# Patient Record
Sex: MALE | Race: WHITE | Employment: OTHER
[De-identification: names, ages, dates, MRNs, and addresses within clinical notes are randomized per-mention and may not be internally consistent; named-entity substitution may affect disease eponyms.]

---

## 2022-03-18 PROBLEM — K57.30 DIVERTICULOSIS OF SIGMOID COLON: Status: ACTIVE | Noted: 2017-01-09

## 2022-03-19 PROBLEM — K64.1 GRADE II HEMORRHOIDS: Status: ACTIVE | Noted: 2017-01-09

## 2022-03-19 PROBLEM — I50.9 CHF (CONGESTIVE HEART FAILURE) (HCC): Status: ACTIVE | Noted: 2021-09-06

## 2022-03-19 PROBLEM — K64.3 GRADE IV HEMORRHOIDS: Status: ACTIVE | Noted: 2017-01-09

## 2022-03-19 PROBLEM — R06.02 SOB (SHORTNESS OF BREATH): Status: ACTIVE | Noted: 2021-08-22

## 2023-05-21 ENCOUNTER — HOSPITAL ENCOUNTER (EMERGENCY)
Facility: HOSPITAL | Age: 89
Discharge: HOME | End: 2023-05-21
Attending: EMERGENCY MEDICINE
Payer: COMMERCIAL

## 2023-05-21 VITALS
DIASTOLIC BLOOD PRESSURE: 78 MMHG | TEMPERATURE: 98 F | WEIGHT: 155 LBS | HEART RATE: 76 BPM | BODY MASS INDEX: 20.99 KG/M2 | HEIGHT: 72 IN | OXYGEN SATURATION: 95 % | RESPIRATION RATE: 16 BRPM | SYSTOLIC BLOOD PRESSURE: 152 MMHG

## 2023-05-21 DIAGNOSIS — H10.9 CONJUNCTIVITIS OF RIGHT EYE, UNSPECIFIED CONJUNCTIVITIS TYPE: Primary | ICD-10-CM

## 2023-05-21 DIAGNOSIS — S05.01XA ABRASION OF RIGHT CORNEA, INITIAL ENCOUNTER: ICD-10-CM

## 2023-05-21 PROCEDURE — 87186 SC STD MICRODIL/AGAR DIL: CPT

## 2023-05-21 PROCEDURE — 87077 CULTURE AEROBIC IDENTIFY: CPT

## 2023-05-21 PROCEDURE — 87070 CULTURE OTHR SPECIMN AEROBIC: CPT

## 2023-05-21 PROCEDURE — 99283 EMERGENCY DEPT VISIT LOW MDM: CPT

## 2023-05-21 PROCEDURE — 87147 CULTURE TYPE IMMUNOLOGIC: CPT

## 2023-05-21 PROCEDURE — 6370000000 HC RX 637 (ALT 250 FOR IP): Performed by: EMERGENCY MEDICINE

## 2023-05-21 RX ORDER — GENTAMICIN SULFATE 3 MG/ML
1 SOLUTION/ DROPS OPHTHALMIC EVERY 4 HOURS
Qty: 5 ML | Refills: 0 | Status: SHIPPED | OUTPATIENT
Start: 2023-05-21 | End: 2023-05-31

## 2023-05-21 RX ORDER — POLYMYXIN B SULFATE AND TRIMETHOPRIM 1; 10000 MG/ML; [USP'U]/ML
1 SOLUTION OPHTHALMIC EVERY 4 HOURS
Qty: 10 ML | Refills: 0 | Status: SHIPPED | OUTPATIENT
Start: 2023-05-21 | End: 2023-05-31

## 2023-05-21 RX ORDER — TETRACAINE HYDROCHLORIDE 5 MG/ML
1 SOLUTION OPHTHALMIC ONCE
Status: COMPLETED | OUTPATIENT
Start: 2023-05-21 | End: 2023-05-21

## 2023-05-21 RX ADMIN — TETRACAINE HYDROCHLORIDE 1 DROP: 5 SOLUTION OPHTHALMIC at 19:09

## 2023-05-21 RX ADMIN — FLUORESCEIN SODIUM 1 MG: 1 STRIP OPHTHALMIC at 19:09

## 2023-05-21 ASSESSMENT — ENCOUNTER SYMPTOMS: EYE PAIN: 1

## 2023-05-21 ASSESSMENT — LIFESTYLE VARIABLES
HOW OFTEN DO YOU HAVE A DRINK CONTAINING ALCOHOL: NEVER
HOW MANY STANDARD DRINKS CONTAINING ALCOHOL DO YOU HAVE ON A TYPICAL DAY: PATIENT DOES NOT DRINK

## 2023-05-22 ASSESSMENT — ENCOUNTER SYMPTOMS: EYE PAIN: 1

## 2023-05-26 LAB
BACTERIA SPEC CULT: ABNORMAL
SERVICE CMNT-IMP: ABNORMAL

## 2023-12-03 ENCOUNTER — APPOINTMENT (OUTPATIENT)
Facility: HOSPITAL | Age: 89
End: 2023-12-03
Payer: COMMERCIAL

## 2023-12-03 ENCOUNTER — HOSPITAL ENCOUNTER (EMERGENCY)
Facility: HOSPITAL | Age: 89
Discharge: HOME | End: 2023-12-03
Attending: STUDENT IN AN ORGANIZED HEALTH CARE EDUCATION/TRAINING PROGRAM
Payer: COMMERCIAL

## 2023-12-03 VITALS
BODY MASS INDEX: 23.13 KG/M2 | OXYGEN SATURATION: 99 % | HEIGHT: 70 IN | WEIGHT: 161.6 LBS | RESPIRATION RATE: 20 BRPM | HEART RATE: 86 BPM | SYSTOLIC BLOOD PRESSURE: 144 MMHG | DIASTOLIC BLOOD PRESSURE: 78 MMHG | TEMPERATURE: 97.4 F

## 2023-12-03 DIAGNOSIS — J06.9 UPPER RESPIRATORY TRACT INFECTION DUE TO COVID-19 VIRUS: ICD-10-CM

## 2023-12-03 DIAGNOSIS — U07.1 UPPER RESPIRATORY TRACT INFECTION DUE TO COVID-19 VIRUS: ICD-10-CM

## 2023-12-03 DIAGNOSIS — U07.1 COVID-19: Primary | ICD-10-CM

## 2023-12-03 LAB
SARS-COV-2 RDRP RESP QL NAA+PROBE: DETECTED
SOURCE: ABNORMAL

## 2023-12-03 PROCEDURE — 71046 X-RAY EXAM CHEST 2 VIEWS: CPT

## 2023-12-03 PROCEDURE — 99284 EMERGENCY DEPT VISIT MOD MDM: CPT

## 2023-12-03 PROCEDURE — 87635 SARS-COV-2 COVID-19 AMP PRB: CPT

## 2023-12-03 RX ORDER — BENZONATATE 100 MG/1
100 CAPSULE ORAL 3 TIMES DAILY PRN
Qty: 20 CAPSULE | Refills: 0 | Status: SHIPPED | OUTPATIENT
Start: 2023-12-03 | End: 2023-12-10

## 2023-12-03 RX ORDER — BENZONATATE 100 MG/1
100 CAPSULE ORAL 3 TIMES DAILY PRN
Qty: 20 CAPSULE | Refills: 0 | Status: SHIPPED | OUTPATIENT
Start: 2023-12-03 | End: 2023-12-03 | Stop reason: SDUPTHER

## 2023-12-03 ASSESSMENT — ENCOUNTER SYMPTOMS
COUGH: 1
VOMITING: 0
WHEEZING: 0
SORE THROAT: 0
SHORTNESS OF BREATH: 1
NAUSEA: 0
SINUS PAIN: 0
ABDOMINAL PAIN: 0
DIARRHEA: 0

## 2023-12-03 ASSESSMENT — PAIN - FUNCTIONAL ASSESSMENT: PAIN_FUNCTIONAL_ASSESSMENT: 0-10

## 2023-12-03 ASSESSMENT — LIFESTYLE VARIABLES: HOW OFTEN DO YOU HAVE A DRINK CONTAINING ALCOHOL: NEVER

## 2023-12-03 ASSESSMENT — PAIN SCALES - GENERAL: PAINLEVEL_OUTOF10: 0

## 2023-12-03 NOTE — ED TRIAGE NOTES
Per patient, 11/17 was Covid (+) completed treatment and is currently c/o a dry cough for 4-5 days. Self-administered Albuterol at about 1500 has hx of asthma. Used cough drops as well with no relief in symptoms.

## 2023-12-04 NOTE — ED NOTES
Discharge instructions provided. Pt verbalized understanding. Opportunity provided for questions. Pt discharged home.        Eli Hernández RN  12/03/23 5949

## 2023-12-04 NOTE — DISCHARGE INSTRUCTIONS
Thank you for allowing us to provide you with medical care today. We realize that you have many choices for your emergency care needs. We thank you for choosing [facility]. Please choose us in the future for any continued health care needs. The exam and treatment you received in the Emergency Department were for an emergent problem and are not intended as complete care. It is important that you follow up with a doctor, nurse practitioner, or physician's assistant for ongoing care. If your symptoms worsen or you do not improve as expected and you are unable to reach your usual health care provider, you should return to the Emergency Department. We are available 24 hours a day. Please make an appointment with your health care provider(s) for follow up of your Emergency Department visit. Take this sheet with you when you go to your follow-up visit.

## 2024-01-17 ENCOUNTER — APPOINTMENT (OUTPATIENT)
Facility: HOSPITAL | Age: 89
End: 2024-01-17
Payer: MEDICARE

## 2024-01-17 ENCOUNTER — HOSPITAL ENCOUNTER (EMERGENCY)
Facility: HOSPITAL | Age: 89
Discharge: HOME | End: 2024-01-17
Attending: EMERGENCY MEDICINE
Payer: MEDICARE

## 2024-01-17 VITALS
WEIGHT: 160.94 LBS | SYSTOLIC BLOOD PRESSURE: 126 MMHG | HEART RATE: 71 BPM | DIASTOLIC BLOOD PRESSURE: 99 MMHG | BODY MASS INDEX: 23.09 KG/M2 | RESPIRATION RATE: 18 BRPM | TEMPERATURE: 97.7 F | OXYGEN SATURATION: 100 %

## 2024-01-17 DIAGNOSIS — R06.09 DOE (DYSPNEA ON EXERTION): ICD-10-CM

## 2024-01-17 DIAGNOSIS — N28.9 RENAL INSUFFICIENCY: Primary | ICD-10-CM

## 2024-01-17 LAB
ALBUMIN SERPL-MCNC: 3.7 G/DL (ref 3.5–5)
ALBUMIN/GLOB SERPL: 0.9 (ref 1.1–2.2)
ALP SERPL-CCNC: 94 U/L (ref 45–117)
ALT SERPL-CCNC: 18 U/L (ref 12–78)
ANION GAP SERPL CALC-SCNC: 7 MMOL/L (ref 5–15)
AST SERPL-CCNC: 17 U/L (ref 15–37)
BASOPHILS # BLD: 0.1 K/UL (ref 0–0.1)
BASOPHILS NFR BLD: 1 % (ref 0–1)
BILIRUB SERPL-MCNC: 0.8 MG/DL (ref 0.2–1)
BUN SERPL-MCNC: 43 MG/DL (ref 6–20)
BUN/CREAT SERPL: 22 (ref 12–20)
CALCIUM SERPL-MCNC: 8.9 MG/DL (ref 8.5–10.1)
CHLORIDE SERPL-SCNC: 104 MMOL/L (ref 97–108)
CO2 SERPL-SCNC: 29 MMOL/L (ref 21–32)
CREAT SERPL-MCNC: 1.95 MG/DL (ref 0.7–1.3)
DIFFERENTIAL METHOD BLD: ABNORMAL
EKG ATRIAL RATE: 76 BPM
EKG DIAGNOSIS: NORMAL
EKG P-R INTERVAL: 178 MS
EKG Q-T INTERVAL: 474 MS
EKG QRS DURATION: 174 MS
EKG QTC CALCULATION (BAZETT): 519 MS
EKG R AXIS: -69 DEGREES
EKG T AXIS: 95 DEGREES
EKG VENTRICULAR RATE: 72 BPM
EOSINOPHIL # BLD: 0.4 K/UL (ref 0–0.4)
EOSINOPHIL NFR BLD: 5 % (ref 0–7)
ERYTHROCYTE [DISTWIDTH] IN BLOOD BY AUTOMATED COUNT: 17.2 % (ref 11.5–14.5)
GLOBULIN SER CALC-MCNC: 4 G/DL (ref 2–4)
GLUCOSE SERPL-MCNC: 84 MG/DL (ref 65–100)
HCT VFR BLD AUTO: 38.2 % (ref 36.6–50.3)
HGB BLD-MCNC: 12.5 G/DL (ref 12.1–17)
IMM GRANULOCYTES # BLD AUTO: 0.1 K/UL (ref 0–0.04)
IMM GRANULOCYTES NFR BLD AUTO: 1 % (ref 0–0.5)
LYMPHOCYTES # BLD: 2.6 K/UL (ref 0.8–3.5)
LYMPHOCYTES NFR BLD: 28 % (ref 12–49)
MAGNESIUM SERPL-MCNC: 2.2 MG/DL (ref 1.6–2.4)
MCH RBC QN AUTO: 33.2 PG (ref 26–34)
MCHC RBC AUTO-ENTMCNC: 32.7 G/DL (ref 30–36.5)
MCV RBC AUTO: 101.3 FL (ref 80–99)
MONOCYTES # BLD: 1.1 K/UL (ref 0–1)
MONOCYTES NFR BLD: 12 % (ref 5–13)
NEUTS SEG # BLD: 5.1 K/UL (ref 1.8–8)
NEUTS SEG NFR BLD: 53 % (ref 32–75)
NRBC # BLD: 0.03 K/UL (ref 0–0.01)
NRBC BLD-RTO: 0.3 PER 100 WBC
NT PRO BNP: 1567 PG/ML (ref 0–450)
PLATELET # BLD AUTO: 184 K/UL (ref 150–400)
PMV BLD AUTO: 11.4 FL (ref 8.9–12.9)
POTASSIUM SERPL-SCNC: 5.3 MMOL/L (ref 3.5–5.1)
PROT SERPL-MCNC: 7.7 G/DL (ref 6.4–8.2)
RBC # BLD AUTO: 3.77 M/UL (ref 4.1–5.7)
SODIUM SERPL-SCNC: 140 MMOL/L (ref 136–145)
TROPONIN I SERPL HS-MCNC: 23 NG/L (ref 0–76)
TROPONIN I SERPL HS-MCNC: 25 NG/L (ref 0–76)
WBC # BLD AUTO: 9.3 K/UL (ref 4.1–11.1)

## 2024-01-17 PROCEDURE — 80053 COMPREHEN METABOLIC PANEL: CPT

## 2024-01-17 PROCEDURE — 93005 ELECTROCARDIOGRAM TRACING: CPT | Performed by: EMERGENCY MEDICINE

## 2024-01-17 PROCEDURE — 83735 ASSAY OF MAGNESIUM: CPT

## 2024-01-17 PROCEDURE — 85025 COMPLETE CBC W/AUTO DIFF WBC: CPT

## 2024-01-17 PROCEDURE — 83880 ASSAY OF NATRIURETIC PEPTIDE: CPT

## 2024-01-17 PROCEDURE — 71045 X-RAY EXAM CHEST 1 VIEW: CPT

## 2024-01-17 PROCEDURE — 84484 ASSAY OF TROPONIN QUANT: CPT

## 2024-01-17 PROCEDURE — 99285 EMERGENCY DEPT VISIT HI MDM: CPT

## 2024-01-17 PROCEDURE — 36415 COLL VENOUS BLD VENIPUNCTURE: CPT

## 2024-01-17 ASSESSMENT — ENCOUNTER SYMPTOMS
SHORTNESS OF BREATH: 1
STRIDOR: 0
ABDOMINAL PAIN: 0

## 2024-01-17 ASSESSMENT — PAIN - FUNCTIONAL ASSESSMENT: PAIN_FUNCTIONAL_ASSESSMENT: NONE - DENIES PAIN

## 2024-01-17 NOTE — DISCHARGE INSTRUCTIONS
Please go to Dr. Hernández's office tomorrow.  He will see you at 1045 but asked that you get there by 1020.  No strenuous activity until released by cardiology.  Return to the emergency department if you develop any new or worsening symptoms.

## 2024-01-17 NOTE — ED NOTES
Discharge instructions reviewed with pt and copy given by this RN. Pt educated on follow up with Cardiology and PCP. Pt verbalized understanding of all ED MD d/ instruction and education. Pt waiting in ED room for wife for transportation home at this time.

## 2024-01-17 NOTE — ED NOTES
Pt ambulated around nurses station twice. SP02 remained at 97% and heart rate at 98. Pt had no complaints during walk. Provider and nurse informed and aware.

## 2024-01-17 NOTE — ED TRIAGE NOTES
Patient presents ambulatory to the ER with complaints of shortness of breath on exertion that started this morning. Patient denies chest pain associated with shortness of breath.

## 2024-01-17 NOTE — ED PROVIDER NOTES
[facility]  EMERGENCY DEPARTMENT ENCOUNTER      Pt Name: Vinicius Calles  MRN: [id number]  Birthdate 11/14/1932  Date of evaluation: 1/17/2024  Provider: MEDARDO FUCHS MD    CHIEF COMPLAINT       Chief Complaint   Patient presents with    Shortness of Breath         HISTORY OF PRESENT ILLNESS   (Location/Symptom, Timing/Onset, Context/Setting, Quality, Duration, Modifying Factors, Severity)  Note limiting factors.   Patient is a pleasant 89 yo male with past medical history significant for symptomatic bradycardia status post pacemaker followed at [facility] but also seen locally by Dr. Candelaria and Dr. Alirio Hernández who presents with episode of shortness of breath with exertion.  He dealt with COVID infection from November through December noting that he had taken Paxil bid and then had recurrent positive testing after the Paxlovid finished.  He states he recovered fully from that infection.  He states that he does have a rowing machine at home and about a week or 2 ago was on the rowing machine and had an episode of substernal chest pain lasting about 90 minutes not associated with diaphoresis or radiating pain.  He states he is felt okay recently but then [place] and had fairly significant shortness of breath per wife at bedside.  Denies any pain at any point.  He states he was able to get in touch with his cardiology group and has an appointment tomorrow with them.  States he does have history of congestive heart failure but did not note any weight gain of late.  Denies any peripheral pain or recent travel or surgeries.  He states he is on Eliquis at baseline as well denies any prior episodes of DVT or PE.  Denies any overt orthopnea and states he sleeps with 2 pillows but notes that this is not new.  He also states he has asthma but denies any purulent sputum    The history is provided by the patient and the spouse.         Review of External Medical Records:     Nursing Notes were

## 2024-01-19 ENCOUNTER — HOSPITAL ENCOUNTER (OUTPATIENT)
Facility: HOSPITAL | Age: 89
Discharge: HOME | End: 2024-01-19
Attending: INTERNAL MEDICINE | Admitting: INTERNAL MEDICINE
Payer: MEDICARE

## 2024-01-19 VITALS
DIASTOLIC BLOOD PRESSURE: 77 MMHG | HEIGHT: 69 IN | OXYGEN SATURATION: 95 % | BODY MASS INDEX: 23.7 KG/M2 | TEMPERATURE: 98 F | SYSTOLIC BLOOD PRESSURE: 110 MMHG | RESPIRATION RATE: 18 BRPM | WEIGHT: 160 LBS | HEART RATE: 85 BPM

## 2024-01-19 DIAGNOSIS — I20.0 UNSTABLE ANGINA (HCC): ICD-10-CM

## 2024-01-19 LAB
ANION GAP SERPL CALC-SCNC: 4 MMOL/L (ref 5–15)
BUN SERPL-MCNC: 48 MG/DL (ref 6–20)
BUN/CREAT SERPL: 26 (ref 12–20)
CALCIUM SERPL-MCNC: 9.1 MG/DL (ref 8.5–10.1)
CHLORIDE SERPL-SCNC: 107 MMOL/L (ref 97–108)
CO2 SERPL-SCNC: 29 MMOL/L (ref 21–32)
CREAT SERPL-MCNC: 1.82 MG/DL (ref 0.7–1.3)
ECHO BSA: 1.88 M2
ERYTHROCYTE [DISTWIDTH] IN BLOOD BY AUTOMATED COUNT: 16.8 % (ref 11.5–14.5)
GLUCOSE SERPL-MCNC: 116 MG/DL (ref 65–100)
HCT VFR BLD AUTO: 38.9 % (ref 36.6–50.3)
HGB BLD-MCNC: 12.6 G/DL (ref 12.1–17)
MCH RBC QN AUTO: 33.3 PG (ref 26–34)
MCHC RBC AUTO-ENTMCNC: 32.4 G/DL (ref 30–36.5)
MCV RBC AUTO: 102.9 FL (ref 80–99)
NRBC # BLD: 0 K/UL (ref 0–0.01)
NRBC BLD-RTO: 0 PER 100 WBC
PLATELET # BLD AUTO: 196 K/UL (ref 150–400)
PMV BLD AUTO: 12 FL (ref 8.9–12.9)
POTASSIUM SERPL-SCNC: 5.1 MMOL/L (ref 3.5–5.1)
RBC # BLD AUTO: 3.78 M/UL (ref 4.1–5.7)
SODIUM SERPL-SCNC: 140 MMOL/L (ref 136–145)
WBC # BLD AUTO: 10.1 K/UL (ref 4.1–11.1)

## 2024-01-19 PROCEDURE — C1760 CLOSURE DEV, VASC: HCPCS | Performed by: INTERNAL MEDICINE

## 2024-01-19 PROCEDURE — 85027 COMPLETE CBC AUTOMATED: CPT

## 2024-01-19 PROCEDURE — 99152 MOD SED SAME PHYS/QHP 5/>YRS: CPT | Performed by: INTERNAL MEDICINE

## 2024-01-19 PROCEDURE — 2709999900 HC NON-CHARGEABLE SUPPLY: Performed by: INTERNAL MEDICINE

## 2024-01-19 PROCEDURE — 6360000002 HC RX W HCPCS: Performed by: INTERNAL MEDICINE

## 2024-01-19 PROCEDURE — 80048 BASIC METABOLIC PNL TOTAL CA: CPT

## 2024-01-19 PROCEDURE — 36415 COLL VENOUS BLD VENIPUNCTURE: CPT

## 2024-01-19 PROCEDURE — 6360000004 HC RX CONTRAST MEDICATION: Performed by: INTERNAL MEDICINE

## 2024-01-19 PROCEDURE — C1713 ANCHOR/SCREW BN/BN,TIS/BN: HCPCS | Performed by: INTERNAL MEDICINE

## 2024-01-19 PROCEDURE — 2580000003 HC RX 258: Performed by: INTERNAL MEDICINE

## 2024-01-19 PROCEDURE — 2500000003 HC RX 250 WO HCPCS: Performed by: INTERNAL MEDICINE

## 2024-01-19 PROCEDURE — C1894 INTRO/SHEATH, NON-LASER: HCPCS | Performed by: INTERNAL MEDICINE

## 2024-01-19 PROCEDURE — 93458 L HRT ARTERY/VENTRICLE ANGIO: CPT | Performed by: INTERNAL MEDICINE

## 2024-01-19 PROCEDURE — 99153 MOD SED SAME PHYS/QHP EA: CPT | Performed by: INTERNAL MEDICINE

## 2024-01-19 RX ORDER — MIDAZOLAM HYDROCHLORIDE 1 MG/ML
INJECTION INTRAMUSCULAR; INTRAVENOUS PRN
Status: DISCONTINUED | OUTPATIENT
Start: 2024-01-19 | End: 2024-01-19 | Stop reason: HOSPADM

## 2024-01-19 RX ORDER — VERAPAMIL HYDROCHLORIDE 2.5 MG/ML
INJECTION, SOLUTION INTRAVENOUS PRN
Status: DISCONTINUED | OUTPATIENT
Start: 2024-01-19 | End: 2024-01-19 | Stop reason: HOSPADM

## 2024-01-19 RX ORDER — FUROSEMIDE 20 MG/1
20 TABLET ORAL DAILY
COMMUNITY

## 2024-01-19 RX ORDER — METOPROLOL SUCCINATE 25 MG/1
25 TABLET, EXTENDED RELEASE ORAL DAILY
COMMUNITY

## 2024-01-19 RX ORDER — FENTANYL CITRATE 50 UG/ML
INJECTION, SOLUTION INTRAMUSCULAR; INTRAVENOUS PRN
Status: DISCONTINUED | OUTPATIENT
Start: 2024-01-19 | End: 2024-01-19 | Stop reason: HOSPADM

## 2024-01-19 RX ORDER — 0.9 % SODIUM CHLORIDE 0.9 %
INTRAVENOUS SOLUTION INTRAVENOUS CONTINUOUS PRN
Status: COMPLETED | OUTPATIENT
Start: 2024-01-19 | End: 2024-01-19

## 2024-01-19 RX ORDER — EZETIMIBE 10 MG/1
10 TABLET ORAL DAILY
COMMUNITY

## 2024-01-19 RX ORDER — LIDOCAINE HYDROCHLORIDE 10 MG/ML
INJECTION, SOLUTION INFILTRATION; PERINEURAL PRN
Status: DISCONTINUED | OUTPATIENT
Start: 2024-01-19 | End: 2024-01-19 | Stop reason: HOSPADM

## 2024-01-19 NOTE — PROGRESS NOTES
TRANSFER - IN REPORT:    Verbal report received from LACEY Jenkins on Vinicius Calles  being received from [facility] for routine post op. Report consisted of patient’s Situation, Background, Assessment and Recommendations(SBAR). Information from the following report(s) procedure summary, MAR was reviewed with the receiving clinician. Opportunity for questions and clarification was provided. Assessment completed upon patient’s arrival to Cardiac Cath Lab RECOVERY AREA and care assumed.       Cardiac Cath Lab Recovery Arrival Note:    Vinicius Calles arrived to [facility] recovery area.  Patient procedure= LHC. Patient on cardiac monitor, non-invasive blood pressure, SPO2 monitor. On RA.  IV  of NS on pump at 75 ml/hr. Patient status doing well without problems. Patient is A&Ox 4. Patient reports no complaints.    PROCEDURE SITE CHECK:    Procedure site:Right radial without any bleeding and TR band @ 11 , Right groin with Angioseal, zero pain/discomfort reported at procedure site.     No change in patient status. Continue to monitor patient and status.

## 2024-01-19 NOTE — PROGRESS NOTES
CATH LAB to RECOVERY ROOM REPORT    Procedure: [facility]    MD: TRAY Hernández MD    The procedure was diagnostic only.    Verbal Report given to Recovery Nurse on patient being transferred to Cardiac Cath Lab  for routine post-op. Patient stable upon transfer to .  Vitals, mental status, MAR, procedural summary discussed with recovery RN.    Medication given during procedure:    Contrast:100mL                          Versed:4mg                                                               Fentanyl:100mcg                                                           Sheaths:    Right radial sheath pulled at 1058 am, band at 11mL of air    Right femoral artery 6fr sheath pulled at 1050 am with angioseal.

## 2024-01-19 NOTE — PROGRESS NOTES
1300: Ambulated patient to the bathroom with a steady gait, voided without difficulty. Patient denies chest pain, shortness of breath, or dizziness. Returned to stretcher. Vital signs stable.     Procedural site is clean, dry, and intact. No bleeding, no hematoma.     Assisted patient in dressing.     Educated patient about their sedation precautions such as not driving, operating any machinery, or signing legal documents 24 hours post procedure.     Reviewed discharge instructions, including medications and site care using the teach back method.  Answered all questions. Verbalized understanding. Will resume Eliquis tonight    1315: Removed peripheral IV.    1330:Escorted to discharge area in a wheelchair with all of their belongings including cell phone.    1330: Patient's relative present to take patient home. Reviewed discharge instructions with patients' relative, they verbalized understanding.

## 2024-01-19 NOTE — DISCHARGE INSTRUCTIONS
[email or web address]    Patient Discharge Instructions    Vinicius Calles / [id number] : 1932    Admitted 2024 Discharged: 2024       It is important that you take the medication exactly as they are prescribed.   Keep your medication in the bottles provided by the pharmacist and keep a list of the medication names, dosages, and times to be taken in your wallet.   Do not take other medications without consulting your doctor.     BRING ALL OF YOUR MEDICINES TO YOUR OFFICE VISIT with Dr. Hernández    Follow-up with Alirio Hernández MD in 6 week      Cardiac Catheterization  Discharge Instructions    Do not drive, operate any machinery, or sign any legal documents for 24 hours after your procedure.  You must have someone to drive you home.    You may take a shower 24 hours after your cardiac catheterization.  Be sure to get the dressing wet and then remove it; gently wash the area with warm soapy water.  Pat dry and leave open to air.  To help prevent infections, be sure to keep the cath site clean and dry.  No lotions, creams, powders, ointments, etc. in the cath site for approximately 1 week.    Do not take a tub bath, get in a hot tub or swimming pool for approximately 5 days or until the cath site is completely healed.      No strenuous activity or heavy lifting over 10 lbs. for 7 days.    Drink plenty of fluids for 24-48 hours after your cath to flush the contrast dye from your kidneys. No alcoholic beverages for 24 hours.  You may resume your previous diet (low fat, low cholesterol) after your cath.      After your cath, some bruising or discomfort is common during the healing process.  Tylenol, 1-2 tablets every 6 hours as needed, is recommended if you experience any discomfort.  If you experience any signs or symptoms of infection such as fever, chills, or poorly healing incision, persistent tenderness or swelling in the groin, redness and/or warmth to the touch, numbness, significant tingling or pain at

## 2024-01-19 NOTE — PROGRESS NOTES
Cardiac Cath Lab Recovery Arrival Note:      Vinicius Calles arrived to Cardiac Cath Lab, Recovery Area. Staff introduced to patient. Patient identifiers verified with NAME and DATE OF BIRTH. Procedure verified with patient. Consent forms reviewed and signed by patient or authorized representative and verified. Allergies verified.     Patient and family oriented to department. Patient and family informed of procedure and plan of care.     Questions answered with review. Patient prepped for procedure, per orders from physician, prior to arrival.    Patient on cardiac monitor, non-invasive blood pressure, SPO2 monitor. On RA. Patient is A&Ox 4. Patient reports no complaints.     Patient in stretcher, in low position, with side rails up, call bell within reach, patient instructed to call if assistance as needed.    Patient prep in: [facility] Recovery Area, [place] [address].   Patient family has pager #   Family in: Alysa, wife [phone].   Prep by: ANA Leonardo

## 2024-08-07 ENCOUNTER — HOSPITAL ENCOUNTER (INPATIENT)
Facility: HOSPITAL | Age: 89
LOS: 4 days | Discharge: NURSING, REHAB OR LONG-TERM CARE | DRG: 536 | End: 2024-08-12
Attending: EMERGENCY MEDICINE | Admitting: FAMILY MEDICINE
Payer: MEDICARE

## 2024-08-07 ENCOUNTER — APPOINTMENT (OUTPATIENT)
Facility: HOSPITAL | Age: 89
DRG: 536 | End: 2024-08-07
Payer: MEDICARE

## 2024-08-07 DIAGNOSIS — S70.01XA HEMATOMA OF RIGHT HIP, INITIAL ENCOUNTER: Primary | ICD-10-CM

## 2024-08-07 DIAGNOSIS — S32.511A CLOSED FRACTURE OF SUPERIOR RAMUS OF RIGHT PUBIS, INITIAL ENCOUNTER (HCC): ICD-10-CM

## 2024-08-07 DIAGNOSIS — M25.551 HIP PAIN, ACUTE, RIGHT: ICD-10-CM

## 2024-08-07 PROBLEM — S32.9XXS: Status: ACTIVE | Noted: 2024-08-07

## 2024-08-07 LAB
ANION GAP SERPL CALC-SCNC: 5 MMOL/L (ref 5–15)
BASOPHILS # BLD: 0.1 K/UL (ref 0–0.1)
BASOPHILS NFR BLD: 1 % (ref 0–1)
BUN SERPL-MCNC: 55 MG/DL (ref 6–20)
BUN/CREAT SERPL: 25 (ref 12–20)
CALCIUM SERPL-MCNC: 9.6 MG/DL (ref 8.5–10.1)
CHLORIDE SERPL-SCNC: 105 MMOL/L (ref 97–108)
CO2 SERPL-SCNC: 29 MMOL/L (ref 21–32)
COMMENT:: NORMAL
CREAT SERPL-MCNC: 2.2 MG/DL (ref 0.7–1.3)
DIFFERENTIAL METHOD BLD: ABNORMAL
EOSINOPHIL # BLD: 0.2 K/UL (ref 0–0.4)
EOSINOPHIL NFR BLD: 1 % (ref 0–7)
ERYTHROCYTE [DISTWIDTH] IN BLOOD BY AUTOMATED COUNT: 17.2 % (ref 11.5–14.5)
GLUCOSE SERPL-MCNC: 156 MG/DL (ref 65–100)
HCT VFR BLD AUTO: 37.9 % (ref 36.6–50.3)
HGB BLD-MCNC: 12.1 G/DL (ref 12.1–17)
IMM GRANULOCYTES # BLD AUTO: 0.1 K/UL (ref 0–0.04)
IMM GRANULOCYTES NFR BLD AUTO: 1 % (ref 0–0.5)
LYMPHOCYTES # BLD: 2.5 K/UL (ref 0.8–3.5)
LYMPHOCYTES NFR BLD: 19 % (ref 12–49)
MCH RBC QN AUTO: 33.8 PG (ref 26–34)
MCHC RBC AUTO-ENTMCNC: 31.9 G/DL (ref 30–36.5)
MCV RBC AUTO: 105.9 FL (ref 80–99)
MONOCYTES # BLD: 1.5 K/UL (ref 0–1)
MONOCYTES NFR BLD: 11 % (ref 5–13)
NEUTS SEG # BLD: 8.8 K/UL (ref 1.8–8)
NEUTS SEG NFR BLD: 67 % (ref 32–75)
NRBC # BLD: 0.05 K/UL (ref 0–0.01)
NRBC BLD-RTO: 0.4 PER 100 WBC
PLATELET # BLD AUTO: 182 K/UL (ref 150–400)
PMV BLD AUTO: 11.6 FL (ref 8.9–12.9)
POTASSIUM SERPL-SCNC: 4.5 MMOL/L (ref 3.5–5.1)
RBC # BLD AUTO: 3.58 M/UL (ref 4.1–5.7)
SODIUM SERPL-SCNC: 139 MMOL/L (ref 136–145)
SPECIMEN HOLD: NORMAL
WBC # BLD AUTO: 13.2 K/UL (ref 4.1–11.1)

## 2024-08-07 PROCEDURE — 99285 EMERGENCY DEPT VISIT HI MDM: CPT

## 2024-08-07 PROCEDURE — 80048 BASIC METABOLIC PNL TOTAL CA: CPT

## 2024-08-07 PROCEDURE — 72192 CT PELVIS W/O DYE: CPT

## 2024-08-07 PROCEDURE — 85025 COMPLETE CBC W/AUTO DIFF WBC: CPT

## 2024-08-07 PROCEDURE — 2580000003 HC RX 258: Performed by: STUDENT IN AN ORGANIZED HEALTH CARE EDUCATION/TRAINING PROGRAM

## 2024-08-07 PROCEDURE — 94640 AIRWAY INHALATION TREATMENT: CPT

## 2024-08-07 PROCEDURE — APPNB30 APP NON BILLABLE TIME 0-30 MINS: Performed by: PHYSICIAN ASSISTANT

## 2024-08-07 PROCEDURE — 6370000000 HC RX 637 (ALT 250 FOR IP): Performed by: PHYSICIAN ASSISTANT

## 2024-08-07 PROCEDURE — 6360000002 HC RX W HCPCS: Performed by: STUDENT IN AN ORGANIZED HEALTH CARE EDUCATION/TRAINING PROGRAM

## 2024-08-07 PROCEDURE — 36415 COLL VENOUS BLD VENIPUNCTURE: CPT

## 2024-08-07 RX ORDER — ONDANSETRON 2 MG/ML
4 INJECTION INTRAMUSCULAR; INTRAVENOUS EVERY 6 HOURS PRN
Status: DISCONTINUED | OUTPATIENT
Start: 2024-08-07 | End: 2024-08-12 | Stop reason: HOSPADM

## 2024-08-07 RX ORDER — ATORVASTATIN CALCIUM 40 MG/1
20 TABLET, FILM COATED ORAL DAILY
Status: DISCONTINUED | OUTPATIENT
Start: 2024-08-08 | End: 2024-08-12 | Stop reason: HOSPADM

## 2024-08-07 RX ORDER — SODIUM CHLORIDE 0.9 % (FLUSH) 0.9 %
5-40 SYRINGE (ML) INJECTION PRN
Status: DISCONTINUED | OUTPATIENT
Start: 2024-08-07 | End: 2024-08-12 | Stop reason: HOSPADM

## 2024-08-07 RX ORDER — VALSARTAN 40 MG/1
20 TABLET ORAL DAILY
Status: DISCONTINUED | OUTPATIENT
Start: 2024-08-08 | End: 2024-08-12 | Stop reason: HOSPADM

## 2024-08-07 RX ORDER — FUROSEMIDE 20 MG/1
20 TABLET ORAL DAILY
Status: DISCONTINUED | OUTPATIENT
Start: 2024-08-08 | End: 2024-08-08

## 2024-08-07 RX ORDER — ONDANSETRON 4 MG/1
4 TABLET, ORALLY DISINTEGRATING ORAL EVERY 8 HOURS PRN
Status: DISCONTINUED | OUTPATIENT
Start: 2024-08-07 | End: 2024-08-12 | Stop reason: HOSPADM

## 2024-08-07 RX ORDER — ACETAMINOPHEN 650 MG/1
650 SUPPOSITORY RECTAL EVERY 6 HOURS PRN
Status: DISCONTINUED | OUTPATIENT
Start: 2024-08-07 | End: 2024-08-12 | Stop reason: HOSPADM

## 2024-08-07 RX ORDER — SODIUM CHLORIDE 9 MG/ML
INJECTION, SOLUTION INTRAVENOUS PRN
Status: DISCONTINUED | OUTPATIENT
Start: 2024-08-07 | End: 2024-08-12 | Stop reason: HOSPADM

## 2024-08-07 RX ORDER — HYDROXYZINE HYDROCHLORIDE 25 MG/1
25 TABLET, FILM COATED ORAL 3 TIMES DAILY PRN
Status: DISCONTINUED | OUTPATIENT
Start: 2024-08-07 | End: 2024-08-12 | Stop reason: HOSPADM

## 2024-08-07 RX ORDER — MORPHINE SULFATE 2 MG/ML
2 INJECTION, SOLUTION INTRAMUSCULAR; INTRAVENOUS EVERY 4 HOURS PRN
Status: DISCONTINUED | OUTPATIENT
Start: 2024-08-07 | End: 2024-08-12 | Stop reason: HOSPADM

## 2024-08-07 RX ORDER — DORZOLAMIDE HYDROCHLORIDE AND TIMOLOL MALEATE 20; 5 MG/ML; MG/ML
1 SOLUTION/ DROPS OPHTHALMIC 2 TIMES DAILY
Status: DISCONTINUED | OUTPATIENT
Start: 2024-08-07 | End: 2024-08-08 | Stop reason: CLARIF

## 2024-08-07 RX ORDER — OXYCODONE HYDROCHLORIDE 5 MG/1
5 TABLET ORAL EVERY 4 HOURS PRN
Status: DISCONTINUED | OUTPATIENT
Start: 2024-08-07 | End: 2024-08-12 | Stop reason: HOSPADM

## 2024-08-07 RX ORDER — PANTOPRAZOLE SODIUM 40 MG/1
40 TABLET, DELAYED RELEASE ORAL
Status: DISCONTINUED | OUTPATIENT
Start: 2024-08-08 | End: 2024-08-12 | Stop reason: HOSPADM

## 2024-08-07 RX ORDER — METOPROLOL SUCCINATE 25 MG/1
25 TABLET, EXTENDED RELEASE ORAL DAILY
Status: DISCONTINUED | OUTPATIENT
Start: 2024-08-08 | End: 2024-08-12 | Stop reason: HOSPADM

## 2024-08-07 RX ORDER — POLYETHYLENE GLYCOL 3350 17 G/17G
17 POWDER, FOR SOLUTION ORAL DAILY PRN
Status: DISCONTINUED | OUTPATIENT
Start: 2024-08-07 | End: 2024-08-12 | Stop reason: HOSPADM

## 2024-08-07 RX ORDER — 0.9 % SODIUM CHLORIDE 0.9 %
500 INTRAVENOUS SOLUTION INTRAVENOUS ONCE
Status: DISCONTINUED | OUTPATIENT
Start: 2024-08-07 | End: 2024-08-07

## 2024-08-07 RX ORDER — FINASTERIDE 5 MG/1
5 TABLET, FILM COATED ORAL DAILY
Status: DISCONTINUED | OUTPATIENT
Start: 2024-08-08 | End: 2024-08-12 | Stop reason: HOSPADM

## 2024-08-07 RX ORDER — ACETAMINOPHEN 325 MG/1
650 TABLET ORAL EVERY 6 HOURS PRN
Status: DISCONTINUED | OUTPATIENT
Start: 2024-08-07 | End: 2024-08-12 | Stop reason: HOSPADM

## 2024-08-07 RX ORDER — EZETIMIBE 10 MG/1
10 TABLET ORAL DAILY
Status: DISCONTINUED | OUTPATIENT
Start: 2024-08-08 | End: 2024-08-12 | Stop reason: HOSPADM

## 2024-08-07 RX ORDER — SODIUM CHLORIDE 0.9 % (FLUSH) 0.9 %
5-40 SYRINGE (ML) INJECTION EVERY 12 HOURS SCHEDULED
Status: DISCONTINUED | OUTPATIENT
Start: 2024-08-07 | End: 2024-08-12 | Stop reason: HOSPADM

## 2024-08-07 RX ORDER — OXYCODONE HYDROCHLORIDE 5 MG/1
2.5 TABLET ORAL EVERY 4 HOURS PRN
Status: DISCONTINUED | OUTPATIENT
Start: 2024-08-07 | End: 2024-08-12 | Stop reason: HOSPADM

## 2024-08-07 RX ORDER — ACETAMINOPHEN 325 MG/1
650 TABLET ORAL EVERY 6 HOURS
Status: DISCONTINUED | OUTPATIENT
Start: 2024-08-07 | End: 2024-08-12 | Stop reason: HOSPADM

## 2024-08-07 RX ADMIN — ACETAMINOPHEN 650 MG: 325 TABLET ORAL at 23:03

## 2024-08-07 RX ADMIN — Medication 10 ML: at 23:54

## 2024-08-07 RX ADMIN — ARFORMOTEROL TARTRATE: 15 SOLUTION RESPIRATORY (INHALATION) at 23:43

## 2024-08-07 ASSESSMENT — PAIN DESCRIPTION - PAIN TYPE
TYPE: ACUTE PAIN
TYPE: ACUTE PAIN

## 2024-08-07 ASSESSMENT — PAIN SCALES - GENERAL
PAINLEVEL_OUTOF10: 9
PAINLEVEL_OUTOF10: 9

## 2024-08-07 ASSESSMENT — PAIN - FUNCTIONAL ASSESSMENT
PAIN_FUNCTIONAL_ASSESSMENT: 0-10
PAIN_FUNCTIONAL_ASSESSMENT: PREVENTS OR INTERFERES SOME ACTIVE ACTIVITIES AND ADLS
PAIN_FUNCTIONAL_ASSESSMENT: PREVENTS OR INTERFERES WITH MANY ACTIVE NOT PASSIVE ACTIVITIES

## 2024-08-07 ASSESSMENT — PAIN DESCRIPTION - LOCATION
LOCATION: HIP;PELVIS
LOCATION: HIP

## 2024-08-07 ASSESSMENT — PAIN DESCRIPTION - ORIENTATION: ORIENTATION: RIGHT

## 2024-08-07 ASSESSMENT — PAIN DESCRIPTION - DESCRIPTORS: DESCRIPTORS: ACHING

## 2024-08-07 ASSESSMENT — PAIN DESCRIPTION - FREQUENCY: FREQUENCY: INTERMITTENT

## 2024-08-07 ASSESSMENT — PAIN DESCRIPTION - ONSET: ONSET: SUDDEN

## 2024-08-08 PROBLEM — T14.8XXA HEMATOMA: Status: ACTIVE | Noted: 2024-08-08

## 2024-08-08 PROBLEM — S32.511A CLOSED FRACTURE OF RIGHT SUPERIOR RIM OF PUBIS (HCC): Status: ACTIVE | Noted: 2024-08-08

## 2024-08-08 LAB
ALBUMIN SERPL-MCNC: 3.5 G/DL (ref 3.5–5)
ALBUMIN/GLOB SERPL: 1.3 (ref 1.1–2.2)
ALP SERPL-CCNC: 79 U/L (ref 45–117)
ALT SERPL-CCNC: 56 U/L (ref 12–78)
ANION GAP SERPL CALC-SCNC: 8 MMOL/L (ref 5–15)
AST SERPL-CCNC: 28 U/L (ref 15–37)
BASOPHILS # BLD: 0.1 K/UL (ref 0–0.1)
BASOPHILS NFR BLD: 1 % (ref 0–1)
BILIRUB SERPL-MCNC: 1.6 MG/DL (ref 0.2–1)
BUN SERPL-MCNC: 56 MG/DL (ref 6–20)
BUN/CREAT SERPL: 28 (ref 12–20)
CALCIUM SERPL-MCNC: 8.9 MG/DL (ref 8.5–10.1)
CHLORIDE SERPL-SCNC: 105 MMOL/L (ref 97–108)
CO2 SERPL-SCNC: 27 MMOL/L (ref 21–32)
CREAT SERPL-MCNC: 1.97 MG/DL (ref 0.7–1.3)
DIFFERENTIAL METHOD BLD: ABNORMAL
EOSINOPHIL # BLD: 0.3 K/UL (ref 0–0.4)
EOSINOPHIL NFR BLD: 3 % (ref 0–7)
ERYTHROCYTE [DISTWIDTH] IN BLOOD BY AUTOMATED COUNT: 17.2 % (ref 11.5–14.5)
GLOBULIN SER CALC-MCNC: 2.7 G/DL (ref 2–4)
GLUCOSE SERPL-MCNC: 106 MG/DL (ref 65–100)
HCT VFR BLD AUTO: 31.9 % (ref 36.6–50.3)
HGB BLD-MCNC: 10.7 G/DL (ref 12.1–17)
IMM GRANULOCYTES # BLD AUTO: 0.1 K/UL (ref 0–0.04)
IMM GRANULOCYTES NFR BLD AUTO: 1 % (ref 0–0.5)
LYMPHOCYTES # BLD: 2.7 K/UL (ref 0.8–3.5)
LYMPHOCYTES NFR BLD: 24 % (ref 12–49)
MCH RBC QN AUTO: 34 PG (ref 26–34)
MCHC RBC AUTO-ENTMCNC: 33.5 G/DL (ref 30–36.5)
MCV RBC AUTO: 101.3 FL (ref 80–99)
MONOCYTES # BLD: 1.4 K/UL (ref 0–1)
MONOCYTES NFR BLD: 12 % (ref 5–13)
NEUTS SEG # BLD: 6.8 K/UL (ref 1.8–8)
NEUTS SEG NFR BLD: 59 % (ref 32–75)
NRBC # BLD: 0.05 K/UL (ref 0–0.01)
NRBC BLD-RTO: 0.4 PER 100 WBC
PLATELET # BLD AUTO: 162 K/UL (ref 150–400)
PMV BLD AUTO: 12.4 FL (ref 8.9–12.9)
POTASSIUM SERPL-SCNC: 4 MMOL/L (ref 3.5–5.1)
PROT SERPL-MCNC: 6.2 G/DL (ref 6.4–8.2)
RBC # BLD AUTO: 3.15 M/UL (ref 4.1–5.7)
SODIUM SERPL-SCNC: 140 MMOL/L (ref 136–145)
WBC # BLD AUTO: 11.4 K/UL (ref 4.1–11.1)

## 2024-08-08 PROCEDURE — 85025 COMPLETE CBC W/AUTO DIFF WBC: CPT

## 2024-08-08 PROCEDURE — 6370000000 HC RX 637 (ALT 250 FOR IP): Performed by: PHYSICIAN ASSISTANT

## 2024-08-08 PROCEDURE — 80053 COMPREHEN METABOLIC PANEL: CPT

## 2024-08-08 PROCEDURE — 94640 AIRWAY INHALATION TREATMENT: CPT

## 2024-08-08 PROCEDURE — 97535 SELF CARE MNGMENT TRAINING: CPT

## 2024-08-08 PROCEDURE — 36415 COLL VENOUS BLD VENIPUNCTURE: CPT

## 2024-08-08 PROCEDURE — 6360000002 HC RX W HCPCS: Performed by: STUDENT IN AN ORGANIZED HEALTH CARE EDUCATION/TRAINING PROGRAM

## 2024-08-08 PROCEDURE — 99221 1ST HOSP IP/OBS SF/LOW 40: CPT | Performed by: PHYSICIAN ASSISTANT

## 2024-08-08 PROCEDURE — 6370000000 HC RX 637 (ALT 250 FOR IP): Performed by: STUDENT IN AN ORGANIZED HEALTH CARE EDUCATION/TRAINING PROGRAM

## 2024-08-08 PROCEDURE — 97161 PT EVAL LOW COMPLEX 20 MIN: CPT

## 2024-08-08 PROCEDURE — 6370000000 HC RX 637 (ALT 250 FOR IP)

## 2024-08-08 PROCEDURE — G0378 HOSPITAL OBSERVATION PER HR: HCPCS

## 2024-08-08 PROCEDURE — 1100000000 HC RM PRIVATE

## 2024-08-08 PROCEDURE — 97530 THERAPEUTIC ACTIVITIES: CPT

## 2024-08-08 PROCEDURE — 97165 OT EVAL LOW COMPLEX 30 MIN: CPT

## 2024-08-08 PROCEDURE — 2580000003 HC RX 258: Performed by: STUDENT IN AN ORGANIZED HEALTH CARE EDUCATION/TRAINING PROGRAM

## 2024-08-08 PROCEDURE — 99221 1ST HOSP IP/OBS SF/LOW 40: CPT | Performed by: ORTHOPAEDIC SURGERY

## 2024-08-08 PROCEDURE — 97116 GAIT TRAINING THERAPY: CPT

## 2024-08-08 RX ORDER — DORZOLAMIDE HCL 20 MG/ML
1 SOLUTION/ DROPS OPHTHALMIC 2 TIMES DAILY
Status: DISCONTINUED | OUTPATIENT
Start: 2024-08-08 | End: 2024-08-12 | Stop reason: HOSPADM

## 2024-08-08 RX ORDER — FUROSEMIDE 20 MG/1
20 TABLET ORAL 2 TIMES DAILY
Status: DISCONTINUED | OUTPATIENT
Start: 2024-08-08 | End: 2024-08-12 | Stop reason: HOSPADM

## 2024-08-08 RX ORDER — TIMOLOL MALEATE 5 MG/ML
1 SOLUTION/ DROPS OPHTHALMIC 2 TIMES DAILY
Status: DISCONTINUED | OUTPATIENT
Start: 2024-08-08 | End: 2024-08-12 | Stop reason: HOSPADM

## 2024-08-08 RX ADMIN — PANTOPRAZOLE SODIUM 40 MG: 40 TABLET, DELAYED RELEASE ORAL at 06:47

## 2024-08-08 RX ADMIN — OXYCODONE HYDROCHLORIDE 5 MG: 5 TABLET ORAL at 03:26

## 2024-08-08 RX ADMIN — METOPROLOL SUCCINATE 25 MG: 25 TABLET, EXTENDED RELEASE ORAL at 10:04

## 2024-08-08 RX ADMIN — ACETAMINOPHEN 650 MG: 325 TABLET ORAL at 17:30

## 2024-08-08 RX ADMIN — HYDROXYZINE HYDROCHLORIDE 25 MG: 25 TABLET, FILM COATED ORAL at 09:57

## 2024-08-08 RX ADMIN — ATORVASTATIN CALCIUM 20 MG: 40 TABLET, FILM COATED ORAL at 10:03

## 2024-08-08 RX ADMIN — FINASTERIDE 5 MG: 5 TABLET, FILM COATED ORAL at 09:58

## 2024-08-08 RX ADMIN — ARFORMOTEROL TARTRATE: 15 SOLUTION RESPIRATORY (INHALATION) at 10:31

## 2024-08-08 RX ADMIN — DORZOLAMIDE HYDROCHLORIDE 1 DROP: 20 SOLUTION/ DROPS OPHTHALMIC at 10:08

## 2024-08-08 RX ADMIN — Medication 10 ML: at 10:04

## 2024-08-08 RX ADMIN — ACETAMINOPHEN 650 MG: 325 TABLET ORAL at 22:06

## 2024-08-08 RX ADMIN — FUROSEMIDE 20 MG: 20 TABLET ORAL at 22:07

## 2024-08-08 RX ADMIN — ACETAMINOPHEN 650 MG: 325 TABLET ORAL at 12:06

## 2024-08-08 RX ADMIN — FUROSEMIDE 20 MG: 20 TABLET ORAL at 09:59

## 2024-08-08 RX ADMIN — ACETAMINOPHEN 650 MG: 325 TABLET ORAL at 03:25

## 2024-08-08 RX ADMIN — EZETIMIBE 10 MG: 10 TABLET ORAL at 09:57

## 2024-08-08 RX ADMIN — TIMOLOL MALEATE 1 DROP: 5 SOLUTION OPHTHALMIC at 10:07

## 2024-08-08 RX ADMIN — OXYCODONE HYDROCHLORIDE 5 MG: 5 TABLET ORAL at 20:48

## 2024-08-08 RX ADMIN — OXYCODONE HYDROCHLORIDE 5 MG: 5 TABLET ORAL at 13:57

## 2024-08-08 ASSESSMENT — PAIN SCALES - GENERAL
PAINLEVEL_OUTOF10: 9
PAINLEVEL_OUTOF10: 8
PAINLEVEL_OUTOF10: 7
PAINLEVEL_OUTOF10: 2

## 2024-08-08 ASSESSMENT — PAIN DESCRIPTION - ORIENTATION
ORIENTATION: RIGHT
ORIENTATION: RIGHT

## 2024-08-08 ASSESSMENT — PAIN - FUNCTIONAL ASSESSMENT: PAIN_FUNCTIONAL_ASSESSMENT: PREVENTS OR INTERFERES SOME ACTIVE ACTIVITIES AND ADLS

## 2024-08-08 ASSESSMENT — PAIN DESCRIPTION - LOCATION
LOCATION: PELVIS;BACK
LOCATION: LEG

## 2024-08-08 ASSESSMENT — PAIN DESCRIPTION - DESCRIPTORS: DESCRIPTORS: ACHING;THROBBING

## 2024-08-08 NOTE — ED NOTES
resuscitation: N/A    Recommendation    Pending orders eye drops pending verification from pharmacy, morning labs  Consults ordered: IP CONSULT TO CASE MANAGEMENT    Consulted provider:      Plan for next 24 hours: Continue daily lasix,hold eliquis due to hematoma of right hip, rehab placement, no surgery  Additional Comments: None     If any further questions, please call Sending RN at [phone]  Electronically signed by: Electronically signed by Cecilia Chou RN on 8/7/2024 at 11:45 PM

## 2024-08-08 NOTE — CARE COORDINATION
Care Management Initial Assessment       RUR: N/A  Readmission? No  1st IM letter given? Yes - 8/8  1st  letter given: No    CM met with patient at bedside to introduce self and explain role. Patient states he is independent with ADLs prior to admission. Patient owns 2 RW and a cane. Lives with his spouse in a 3 level house with 5 steps to enter. CM verified with attending/[employer] plan for SNF. CM offered FOC for SNF, [facility] pending.        08/08/24 6024   Service Assessment   Patient Orientation Alert and Oriented;Person;Place;Situation;Self   Cognition Alert   History Provided By Patient   Primary Caregiver Self   Support Systems Spouse/Significant Other   Patient's Healthcare Decision Maker is: Legal Next of Kin   PCP Verified by CM Yes   Last Visit to PCP Within last 3 months   Prior Functional Level Independent in ADLs/IADLs   Can patient return to prior living arrangement No   Ability to make needs known: Good   Family able to assist with home care needs: Yes   Financial Resources Medicare   Social/Functional History   Lives With Spouse   Type of Home House   Home Layout Multi-level   Home Access Stairs to enter with rails   Entrance Stairs - Number of Steps 5   Home Equipment Cane;Walker - Rolling   Discharge Planning   Type of Residence Skilled Nursing Facility   DME Ordered? No   Services At/After Discharge   Services At/After Discharge Skilled Nursing Facility (SNF)   Condition of Participation: Discharge Planning   The Plan for Transition of Care is related to the following treatment goals: SNF   The Patient and/or Patient Representative was provided with a Choice of Provider? Patient   The Patient and/Or Patient Representative agree with the Discharge Plan? Yes   Freedom of Choice list was provided with basic dialogue that supports the patient's individualized plan of care/goals, treatment preferences, and shares the quality data associated with the providers?  Yes     Scarlett

## 2024-08-08 NOTE — ED PROVIDER NOTES
[facility] EMERGENCY DEP  EMERGENCY DEPARTMENT ENCOUNTER      Pt Name: Vinicius Calles  MRN: [id number]  Birthdate 11/14/1932  Date of evaluation: 8/7/2024  Provider: Shruti Flores MD    CHIEF COMPLAINT       Chief Complaint   Patient presents with    Fall    Hip Pain         HISTORY OF PRESENT ILLNESS   (Location/Symptom, Timing/Onset, Context/Setting, Quality, Duration, Modifying Factors, Severity)  Note limiting factors.   Patient is a 89-year-old gentleman who comes into the emergency department for right-sided hip pain.  He fell earlier today, injuring his right hip and his pain has gradually worsened to the point where he is unable to bear weight.  He was seen at [facility], had negative x-rays, and was discharged home.  His family brings him into the emergency department tonight because he continues to have severe pain and is unable to walk.  He did not hit his head and did not lose consciousness; he is at his baseline mental status.    The history is provided by the patient and a relative.         Review of External Medical Records:     Nursing Notes were reviewed.    REVIEW OF SYSTEMS    (2-9 systems for level 4, 10 or more for level 5)     Review of Systems    Except as noted above the remainder of the review of systems was reviewed and negative.       PAST MEDICAL HISTORY     Past Medical History:   Diagnosis Date    Arthritis     knee, ankle    CAD (coronary artery disease)     CHF (congestive heart failure) (HCC)     Chronic kidney disease     Chronic obstructive pulmonary disease (HCC)     denies    GERD (gastroesophageal reflux disease)     Heart failure (HCC)     Hypertension     Ill-defined condition     glaucoma         SURGICAL HISTORY       Past Surgical History:   Procedure Laterality Date    APPENDECTOMY      CARDIAC PROCEDURE N/A 1/19/2024    Left heart cath / coronary angiography performed by Alirio Hernández MD at [facility] CARDIAC CATH LAB    COLONOSCOPY N/A 1/9/2017    COLONOSCOPY,HEMORRHOIDECTOMY

## 2024-08-08 NOTE — ED TRIAGE NOTES
Pt reports tripping over curb at a medical office building.   Pt was seen at [facility] prior to arrival and having negative xray but sent over for CT scan. Pt c/o right pelvic pain and non-weight baring. Pt is on blood thinners BID. Pt denies LOC, denies hitting head. Small skin tear to right elbow.

## 2024-08-08 NOTE — CONSULTS
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  He was able to get up and mobilize without difficulty.  Ultimately he drove himself to the ER in [place].  X-rays of the hip were negative at that time.  He continued to have pain and limited his ability to walk and weight-bear and he ended up going to [facility] where he had a CT scan that demonstrated minimally displaced fracture of the pelvic ring.  He was admitted to the the hospitalist service for pain control, physical therapy, and possible rehab placement.    Past Medical History:   Diagnosis Date    Arthritis     knee, ankle    CAD (coronary artery disease)     CHF (congestive heart failure) ([facility])     Chronic kidney disease     Chronic obstructive pulmonary disease (HCC)     denies    GERD (gastroesophageal reflux disease)     Heart failure (HCC)     Hypertension     Ill-defined condition     glaucoma     Past Surgical History:   Procedure Laterality Date    APPENDECTOMY      CARDIAC PROCEDURE N/A 1/19/2024    Left heart cath / coronary angiography performed by Alirio Hernández MD at [facility] CARDIAC CATH LAB    COLONOSCOPY N/A 1/9/2017    COLONOSCOPY,HEMORRHOIDECTOMY   performed by Vinicius Arita MD at \[facility]\"" MAIN OR    GI      HEENT Bilateral     cataracts    HEENT Right     iridectomy    PACEMAKER INSERTION      August 2023    PROSTATECTOMY      pt denies    VASCULAR SURGERY Bilateral 2012    fem-pop     Allergies   Allergen Reactions    Heparin      mental confusion    Niacin Rash    Sulfa Antibiotics Rash       No current facility-administered medications on file prior to encounter.     Current Outpatient Medications on File Prior to Encounter   Medication Sig Dispense Refill    apixaban (ELIQUIS) 2.5 MG TABS tablet Take 1 tablet by mouth 2 times daily      metoprolol succinate (TOPROL XL) 25 MG extended release tablet Take 1 tablet by mouth daily      furosemide (LASIX) 20 MG tablet Take 1 tablet by mouth 2 times daily   
Reconciliation, Ar   omeprazole (PRILOSEC) 20 MG delayed release capsule Take 1 capsule by mouth daily    Automatic Reconciliation, Ar   simvastatin (ZOCOR) 20 MG tablet Take 1 tablet by mouth nightly    Automatic Reconciliation, Ar        Review of Symptoms:  Except as noted in HPI, patient denies recent fever or chills, nausea, vomiting, diarrhea, hemoptysis, hematemesis, dysuria, myalgias, focal neurologic symptoms, ecchymosis, angioedema, odynophagia, dysphagia, sore throat, earache,rash, melena, hematochezia, depression, GERD, cold intolerance, petechia, bleeding gums, or significant weight loss.    A comprehensive review of systems was negative.     Subjective:    24 hr VS reviewed, overall VSSAF  Temp (24hrs), Av.9 °F (36.6 °C), Min:97.5 °F (36.4 °C), Max:98.1 °F (36.7 °C)    Patient Vitals for the past 8 hrs:   Pulse   24 1514 73   24 1413 71   24 1212 71   24 1007 74   24 0953 69    No data found. Patient Vitals for the past 8 hrs:   BP   24 1514 114/72   24 0953 120/73          Intake/Output Summary (Last 24 hours) at 2024 1639  Last data filed at 2024 1210  Gross per 24 hour   Intake 200 ml   Output 750 ml   Net -550 ml         Physical Exam (complete single organ system exam)    Cons: The patient is no distress. Appears stated age.   HEENT: Normal conjunctivae and palate. No xanthelasma.  Neck: Flat JVP without appreciable HJR.  Resp: Normal respiratory effort with clear lungs bilaterally.   CV: Regular rate and rhythm.   PMI not palpated. Normal S1,S2  No gallop or rubs appreciated.  No murmur apprciated.  Intact carotid upstroke bilaterally without appreciated bruits.  Abdominal aorta not palpated; no abdominal bruit noted.  Normal femoral pulses without bruits.  Intact pedal pulses.     No peripheral edema.  GI: No abd mass noted, soft; no organomegaly noted.  Bowel sounds present.   Muscular:  No significant kyphosis.  Strength WNL for age.  Ext: 
   Chloride 105 97 - 108 mmol/L    CO2 27 21 - 32 mmol/L    Anion Gap 8 5 - 15 mmol/L    Glucose 106 (H) 65 - 100 mg/dL    BUN 56 (H) 6 - 20 MG/DL    Creatinine 1.97 (H) 0.70 - 1.30 MG/DL    BUN/Creatinine Ratio 28 (H) 12 - 20      Est, Glom Filt Rate 32 (L) >60 ml/min/1.73m2    Calcium 8.9 8.5 - 10.1 MG/DL    Total Bilirubin 1.6 (H) 0.2 - 1.0 MG/DL    ALT 56 12 - 78 U/L    AST 28 15 - 37 U/L    Alk Phosphatase 79 45 - 117 U/L    Total Protein 6.2 (L) 6.4 - 8.2 g/dL    Albumin 3.5 3.5 - 5.0 g/dL    Globulin 2.7 2.0 - 4.0 g/dL    Albumin/Globulin Ratio 1.3 1.1 - 2.2     CBC with Auto Differential    Collection Time: 08/08/24  6:43 AM   Result Value Ref Range    WBC 11.4 (H) 4.1 - 11.1 K/uL    RBC 3.15 (L) 4.10 - 5.70 M/uL    Hemoglobin 10.7 (L) 12.1 - 17.0 g/dL    Hematocrit 31.9 (L) 36.6 - 50.3 %    .3 (H) 80.0 - 99.0 FL    MCH 34.0 26.0 - 34.0 PG    MCHC 33.5 30.0 - 36.5 g/dL    RDW 17.2 (H) 11.5 - 14.5 %    Platelets 162 150 - 400 K/uL    MPV 12.4 8.9 - 12.9 FL    Nucleated RBCs 0.4 (H) 0  WBC    nRBC 0.05 (H) 0.00 - 0.01 K/uL    Neutrophils % 59 32 - 75 %    Lymphocytes % 24 12 - 49 %    Monocytes % 12 5 - 13 %    Eosinophils % 3 0 - 7 %    Basophils % 1 0 - 1 %    Immature Granulocytes % 1 (H) 0.0 - 0.5 %    Neutrophils Absolute 6.8 1.8 - 8.0 K/UL    Lymphocytes Absolute 2.7 0.8 - 3.5 K/UL    Monocytes Absolute 1.4 (H) 0.0 - 1.0 K/UL    Eosinophils Absolute 0.3 0.0 - 0.4 K/UL    Basophils Absolute 0.1 0.0 - 0.1 K/UL    Immature Granulocytes Absolute 0.1 (H) 0.00 - 0.04 K/UL    Differential Type AUTOMATED           Impression:     Patient Active Problem List    Diagnosis Date Noted    Closed pelvic straddle injury with pelvic fracture, sequela 08/07/2024    CHF (congestive heart failure) (HCC) 09/06/2021    SOB (shortness of breath) 08/22/2021    Diverticulosis of sigmoid colon 01/09/2017    Grade IV hemorrhoids 01/09/2017    Grade II hemorrhoids 01/09/2017     Principal Problem:    Closed pelvic

## 2024-08-08 NOTE — H&P
ns@0ml/hr  Activity - as tolerated  DVT prophylaxis - scds  GI prophylaxis -  none indicated  Disposition - home    CODE STATUS:   full code       Signed By: Rosita Morales MD     August 7, 2024

## 2024-08-09 ENCOUNTER — APPOINTMENT (OUTPATIENT)
Facility: HOSPITAL | Age: 89
DRG: 536 | End: 2024-08-09
Payer: MEDICARE

## 2024-08-09 LAB
ALBUMIN SERPL-MCNC: 3.6 G/DL (ref 3.5–5)
ALBUMIN/GLOB SERPL: 1.1 (ref 1.1–2.2)
ALP SERPL-CCNC: 85 U/L (ref 45–117)
ALT SERPL-CCNC: 47 U/L (ref 12–78)
ANION GAP SERPL CALC-SCNC: 4 MMOL/L (ref 5–15)
AST SERPL-CCNC: 24 U/L (ref 15–37)
BASOPHILS # BLD: 0.1 K/UL (ref 0–0.1)
BASOPHILS NFR BLD: 1 % (ref 0–1)
BILIRUB SERPL-MCNC: 1.7 MG/DL (ref 0.2–1)
BUN SERPL-MCNC: 53 MG/DL (ref 6–20)
BUN/CREAT SERPL: 30 (ref 12–20)
CALCIUM SERPL-MCNC: 8.7 MG/DL (ref 8.5–10.1)
CHLORIDE SERPL-SCNC: 106 MMOL/L (ref 97–108)
CO2 SERPL-SCNC: 27 MMOL/L (ref 21–32)
CREAT SERPL-MCNC: 1.78 MG/DL (ref 0.7–1.3)
DIFFERENTIAL METHOD BLD: ABNORMAL
EOSINOPHIL # BLD: 0.3 K/UL (ref 0–0.4)
EOSINOPHIL NFR BLD: 2 % (ref 0–7)
ERYTHROCYTE [DISTWIDTH] IN BLOOD BY AUTOMATED COUNT: 16.9 % (ref 11.5–14.5)
GLOBULIN SER CALC-MCNC: 3.4 G/DL (ref 2–4)
GLUCOSE SERPL-MCNC: 117 MG/DL (ref 65–100)
HCT VFR BLD AUTO: 34.1 % (ref 36.6–50.3)
HGB BLD-MCNC: 11.6 G/DL (ref 12.1–17)
IMM GRANULOCYTES # BLD AUTO: 0.1 K/UL (ref 0–0.04)
IMM GRANULOCYTES NFR BLD AUTO: 1 % (ref 0–0.5)
LYMPHOCYTES # BLD: 2.6 K/UL (ref 0.8–3.5)
LYMPHOCYTES NFR BLD: 21 % (ref 12–49)
MCH RBC QN AUTO: 34.2 PG (ref 26–34)
MCHC RBC AUTO-ENTMCNC: 34 G/DL (ref 30–36.5)
MCV RBC AUTO: 100.6 FL (ref 80–99)
MONOCYTES # BLD: 1.4 K/UL (ref 0–1)
MONOCYTES NFR BLD: 11 % (ref 5–13)
NEUTS SEG # BLD: 8.1 K/UL (ref 1.8–8)
NEUTS SEG NFR BLD: 64 % (ref 32–75)
NRBC # BLD: 0.04 K/UL (ref 0–0.01)
NRBC BLD-RTO: 0.3 PER 100 WBC
PLATELET # BLD AUTO: 170 K/UL (ref 150–400)
PMV BLD AUTO: 12.2 FL (ref 8.9–12.9)
POTASSIUM SERPL-SCNC: 3.8 MMOL/L (ref 3.5–5.1)
PROT SERPL-MCNC: 7 G/DL (ref 6.4–8.2)
RBC # BLD AUTO: 3.39 M/UL (ref 4.1–5.7)
SODIUM SERPL-SCNC: 137 MMOL/L (ref 136–145)
TROPONIN I SERPL HS-MCNC: 35 NG/L (ref 0–76)
WBC # BLD AUTO: 12.5 K/UL (ref 4.1–11.1)

## 2024-08-09 PROCEDURE — 97165 OT EVAL LOW COMPLEX 30 MIN: CPT

## 2024-08-09 PROCEDURE — 84484 ASSAY OF TROPONIN QUANT: CPT

## 2024-08-09 PROCEDURE — 6370000000 HC RX 637 (ALT 250 FOR IP): Performed by: NURSE PRACTITIONER

## 2024-08-09 PROCEDURE — 97535 SELF CARE MNGMENT TRAINING: CPT

## 2024-08-09 PROCEDURE — 80053 COMPREHEN METABOLIC PANEL: CPT

## 2024-08-09 PROCEDURE — 6370000000 HC RX 637 (ALT 250 FOR IP): Performed by: PHYSICIAN ASSISTANT

## 2024-08-09 PROCEDURE — 94640 AIRWAY INHALATION TREATMENT: CPT

## 2024-08-09 PROCEDURE — 97530 THERAPEUTIC ACTIVITIES: CPT

## 2024-08-09 PROCEDURE — 71045 X-RAY EXAM CHEST 1 VIEW: CPT

## 2024-08-09 PROCEDURE — 36415 COLL VENOUS BLD VENIPUNCTURE: CPT

## 2024-08-09 PROCEDURE — 85025 COMPLETE CBC W/AUTO DIFF WBC: CPT

## 2024-08-09 PROCEDURE — 6370000000 HC RX 637 (ALT 250 FOR IP): Performed by: STUDENT IN AN ORGANIZED HEALTH CARE EDUCATION/TRAINING PROGRAM

## 2024-08-09 PROCEDURE — 97530 THERAPEUTIC ACTIVITIES: CPT | Performed by: PHYSICAL THERAPIST

## 2024-08-09 PROCEDURE — 6370000000 HC RX 637 (ALT 250 FOR IP)

## 2024-08-09 PROCEDURE — 1100000000 HC RM PRIVATE

## 2024-08-09 PROCEDURE — 97116 GAIT TRAINING THERAPY: CPT | Performed by: PHYSICAL THERAPIST

## 2024-08-09 PROCEDURE — 2580000003 HC RX 258: Performed by: STUDENT IN AN ORGANIZED HEALTH CARE EDUCATION/TRAINING PROGRAM

## 2024-08-09 PROCEDURE — 6360000002 HC RX W HCPCS: Performed by: STUDENT IN AN ORGANIZED HEALTH CARE EDUCATION/TRAINING PROGRAM

## 2024-08-09 RX ORDER — IPRATROPIUM BROMIDE AND ALBUTEROL SULFATE 2.5; .5 MG/3ML; MG/3ML
1 SOLUTION RESPIRATORY (INHALATION) EVERY 4 HOURS PRN
Status: DISCONTINUED | OUTPATIENT
Start: 2024-08-09 | End: 2024-08-12 | Stop reason: HOSPADM

## 2024-08-09 RX ADMIN — ACETAMINOPHEN 650 MG: 325 TABLET ORAL at 21:01

## 2024-08-09 RX ADMIN — TIMOLOL MALEATE 1 DROP: 5 SOLUTION OPHTHALMIC at 21:03

## 2024-08-09 RX ADMIN — OXYCODONE HYDROCHLORIDE 2.5 MG: 5 TABLET ORAL at 12:49

## 2024-08-09 RX ADMIN — TIMOLOL MALEATE 1 DROP: 5 SOLUTION OPHTHALMIC at 09:18

## 2024-08-09 RX ADMIN — ATORVASTATIN CALCIUM 20 MG: 40 TABLET, FILM COATED ORAL at 09:13

## 2024-08-09 RX ADMIN — Medication 10 ML: at 09:18

## 2024-08-09 RX ADMIN — VALSARTAN 20 MG: 40 TABLET, FILM COATED ORAL at 09:12

## 2024-08-09 RX ADMIN — FUROSEMIDE 20 MG: 20 TABLET ORAL at 18:03

## 2024-08-09 RX ADMIN — PANTOPRAZOLE SODIUM 40 MG: 40 TABLET, DELAYED RELEASE ORAL at 06:24

## 2024-08-09 RX ADMIN — APIXABAN 2.5 MG: 5 TABLET, FILM COATED ORAL at 21:02

## 2024-08-09 RX ADMIN — ACETAMINOPHEN 650 MG: 325 TABLET ORAL at 09:13

## 2024-08-09 RX ADMIN — HYDROXYZINE HYDROCHLORIDE 25 MG: 25 TABLET, FILM COATED ORAL at 06:23

## 2024-08-09 RX ADMIN — ACETAMINOPHEN 650 MG: 325 TABLET ORAL at 16:40

## 2024-08-09 RX ADMIN — DORZOLAMIDE HYDROCHLORIDE 1 DROP: 20 SOLUTION/ DROPS OPHTHALMIC at 09:18

## 2024-08-09 RX ADMIN — ARFORMOTEROL TARTRATE: 15 SOLUTION RESPIRATORY (INHALATION) at 09:23

## 2024-08-09 RX ADMIN — ACETAMINOPHEN 650 MG: 325 TABLET ORAL at 06:23

## 2024-08-09 RX ADMIN — ARFORMOTEROL TARTRATE: 15 SOLUTION RESPIRATORY (INHALATION) at 19:48

## 2024-08-09 RX ADMIN — FUROSEMIDE 20 MG: 20 TABLET ORAL at 09:16

## 2024-08-09 RX ADMIN — OXYCODONE HYDROCHLORIDE 5 MG: 5 TABLET ORAL at 02:33

## 2024-08-09 RX ADMIN — DORZOLAMIDE HYDROCHLORIDE 1 DROP: 20 SOLUTION/ DROPS OPHTHALMIC at 21:03

## 2024-08-09 RX ADMIN — HYDROXYZINE HYDROCHLORIDE 25 MG: 25 TABLET, FILM COATED ORAL at 22:17

## 2024-08-09 RX ADMIN — FINASTERIDE 5 MG: 5 TABLET, FILM COATED ORAL at 09:14

## 2024-08-09 RX ADMIN — EZETIMIBE 10 MG: 10 TABLET ORAL at 09:16

## 2024-08-09 RX ADMIN — METOPROLOL SUCCINATE 25 MG: 25 TABLET, EXTENDED RELEASE ORAL at 09:16

## 2024-08-09 ASSESSMENT — PAIN DESCRIPTION - DESCRIPTORS: DESCRIPTORS: ACHING;DISCOMFORT

## 2024-08-09 ASSESSMENT — PAIN - FUNCTIONAL ASSESSMENT: PAIN_FUNCTIONAL_ASSESSMENT: ACTIVITIES ARE NOT PREVENTED

## 2024-08-09 ASSESSMENT — PAIN DESCRIPTION - PAIN TYPE: TYPE: ACUTE PAIN

## 2024-08-09 ASSESSMENT — PAIN SCALES - GENERAL: PAINLEVEL_OUTOF10: 4

## 2024-08-09 ASSESSMENT — PAIN DESCRIPTION - LOCATION: LOCATION: HIP;PELVIS

## 2024-08-09 NOTE — CARE COORDINATION
Transition of Care Plan:    RUR: 14%  Prior Level of Functioning: independent  Disposition: SNF  If SNF or IPR: Date FOC offered: 8/8  Date FOC received: 8/9  Accepting facility: [facility]  Date authorization started with reference number: 8/9 ref# [id number]   Date authorization received and expires:   Follow up appointments: per physician recommendation   DME needed: Defer to SNF  Transportation at discharge: TBD  IM/IMM Medicare/ letter given:   Caregiver Contact: Ramon Calles [phone]  Barriers to discharge: insurance auth, medical stability     CM met with patient and his spouse at bedside to discuss discharge planning.     Patient has been accepted to both [facility] and [facility]. [facility] is choice.     ASTON called [facility] [phone] to start auth. Ref # [id number] auth started 8/9. [facility] aware Auth has started.    Scarlett Grande RN/CRM

## 2024-08-10 LAB
ALBUMIN SERPL-MCNC: 3.2 G/DL (ref 3.5–5)
ALBUMIN/GLOB SERPL: 1.1 (ref 1.1–2.2)
ALP SERPL-CCNC: 95 U/L (ref 45–117)
ALT SERPL-CCNC: 43 U/L (ref 12–78)
ANION GAP SERPL CALC-SCNC: 7 MMOL/L (ref 5–15)
AST SERPL-CCNC: 21 U/L (ref 15–37)
BASOPHILS # BLD: 0.1 K/UL (ref 0–0.1)
BASOPHILS NFR BLD: 1 % (ref 0–1)
BILIRUB SERPL-MCNC: 1.2 MG/DL (ref 0.2–1)
BUN SERPL-MCNC: 50 MG/DL (ref 6–20)
BUN/CREAT SERPL: 28 (ref 12–20)
CALCIUM SERPL-MCNC: 8.7 MG/DL (ref 8.5–10.1)
CHLORIDE SERPL-SCNC: 106 MMOL/L (ref 97–108)
CO2 SERPL-SCNC: 27 MMOL/L (ref 21–32)
CREAT SERPL-MCNC: 1.78 MG/DL (ref 0.7–1.3)
DIFFERENTIAL METHOD BLD: ABNORMAL
EOSINOPHIL # BLD: 0.4 K/UL (ref 0–0.4)
EOSINOPHIL NFR BLD: 4 % (ref 0–7)
ERYTHROCYTE [DISTWIDTH] IN BLOOD BY AUTOMATED COUNT: 16.9 % (ref 11.5–14.5)
GLOBULIN SER CALC-MCNC: 3 G/DL (ref 2–4)
GLUCOSE SERPL-MCNC: 125 MG/DL (ref 65–100)
HCT VFR BLD AUTO: 33.7 % (ref 36.6–50.3)
HGB BLD-MCNC: 10.9 G/DL (ref 12.1–17)
IMM GRANULOCYTES # BLD AUTO: 0.1 K/UL (ref 0–0.04)
IMM GRANULOCYTES NFR BLD AUTO: 1 % (ref 0–0.5)
LYMPHOCYTES # BLD: 3.1 K/UL (ref 0.8–3.5)
LYMPHOCYTES NFR BLD: 28 % (ref 12–49)
MCH RBC QN AUTO: 33.7 PG (ref 26–34)
MCHC RBC AUTO-ENTMCNC: 32.3 G/DL (ref 30–36.5)
MCV RBC AUTO: 104.3 FL (ref 80–99)
MONOCYTES # BLD: 1.5 K/UL (ref 0–1)
MONOCYTES NFR BLD: 14 % (ref 5–13)
NEUTS SEG # BLD: 5.9 K/UL (ref 1.8–8)
NEUTS SEG NFR BLD: 52 % (ref 32–75)
NRBC # BLD: 0.04 K/UL (ref 0–0.01)
NRBC BLD-RTO: 0.4 PER 100 WBC
PLATELET # BLD AUTO: 153 K/UL (ref 150–400)
PMV BLD AUTO: 12.2 FL (ref 8.9–12.9)
POTASSIUM SERPL-SCNC: 4.3 MMOL/L (ref 3.5–5.1)
PROT SERPL-MCNC: 6.2 G/DL (ref 6.4–8.2)
RBC # BLD AUTO: 3.23 M/UL (ref 4.1–5.7)
SODIUM SERPL-SCNC: 140 MMOL/L (ref 136–145)
WBC # BLD AUTO: 11.1 K/UL (ref 4.1–11.1)

## 2024-08-10 PROCEDURE — 85025 COMPLETE CBC W/AUTO DIFF WBC: CPT

## 2024-08-10 PROCEDURE — 6370000000 HC RX 637 (ALT 250 FOR IP): Performed by: PHYSICIAN ASSISTANT

## 2024-08-10 PROCEDURE — 2580000003 HC RX 258: Performed by: STUDENT IN AN ORGANIZED HEALTH CARE EDUCATION/TRAINING PROGRAM

## 2024-08-10 PROCEDURE — 6370000000 HC RX 637 (ALT 250 FOR IP)

## 2024-08-10 PROCEDURE — 1100000000 HC RM PRIVATE

## 2024-08-10 PROCEDURE — 80053 COMPREHEN METABOLIC PANEL: CPT

## 2024-08-10 PROCEDURE — 6370000000 HC RX 637 (ALT 250 FOR IP): Performed by: NURSE PRACTITIONER

## 2024-08-10 PROCEDURE — 6370000000 HC RX 637 (ALT 250 FOR IP): Performed by: STUDENT IN AN ORGANIZED HEALTH CARE EDUCATION/TRAINING PROGRAM

## 2024-08-10 PROCEDURE — 36415 COLL VENOUS BLD VENIPUNCTURE: CPT

## 2024-08-10 PROCEDURE — 6360000002 HC RX W HCPCS: Performed by: STUDENT IN AN ORGANIZED HEALTH CARE EDUCATION/TRAINING PROGRAM

## 2024-08-10 PROCEDURE — 94640 AIRWAY INHALATION TREATMENT: CPT

## 2024-08-10 RX ADMIN — APIXABAN 2.5 MG: 5 TABLET, FILM COATED ORAL at 22:20

## 2024-08-10 RX ADMIN — APIXABAN 2.5 MG: 5 TABLET, FILM COATED ORAL at 08:17

## 2024-08-10 RX ADMIN — FUROSEMIDE 20 MG: 20 TABLET ORAL at 08:17

## 2024-08-10 RX ADMIN — ARFORMOTEROL TARTRATE: 15 SOLUTION RESPIRATORY (INHALATION) at 19:41

## 2024-08-10 RX ADMIN — TIMOLOL MALEATE 1 DROP: 5 SOLUTION OPHTHALMIC at 21:36

## 2024-08-10 RX ADMIN — ACETAMINOPHEN 650 MG: 325 TABLET ORAL at 07:52

## 2024-08-10 RX ADMIN — ARFORMOTEROL TARTRATE: 15 SOLUTION RESPIRATORY (INHALATION) at 07:20

## 2024-08-10 RX ADMIN — FUROSEMIDE 20 MG: 20 TABLET ORAL at 17:19

## 2024-08-10 RX ADMIN — DORZOLAMIDE HYDROCHLORIDE 1 DROP: 20 SOLUTION/ DROPS OPHTHALMIC at 08:18

## 2024-08-10 RX ADMIN — DORZOLAMIDE HYDROCHLORIDE 1 DROP: 20 SOLUTION/ DROPS OPHTHALMIC at 21:36

## 2024-08-10 RX ADMIN — VALSARTAN 20 MG: 40 TABLET, FILM COATED ORAL at 08:17

## 2024-08-10 RX ADMIN — HYDROXYZINE HYDROCHLORIDE 25 MG: 25 TABLET, FILM COATED ORAL at 19:02

## 2024-08-10 RX ADMIN — FINASTERIDE 5 MG: 5 TABLET, FILM COATED ORAL at 08:17

## 2024-08-10 RX ADMIN — ATORVASTATIN CALCIUM 20 MG: 40 TABLET, FILM COATED ORAL at 08:17

## 2024-08-10 RX ADMIN — TIMOLOL MALEATE 1 DROP: 5 SOLUTION OPHTHALMIC at 08:18

## 2024-08-10 RX ADMIN — PANTOPRAZOLE SODIUM 40 MG: 40 TABLET, DELAYED RELEASE ORAL at 07:53

## 2024-08-10 RX ADMIN — ACETAMINOPHEN 650 MG: 325 TABLET ORAL at 23:34

## 2024-08-10 RX ADMIN — ACETAMINOPHEN 650 MG: 325 TABLET ORAL at 14:02

## 2024-08-10 RX ADMIN — METOPROLOL SUCCINATE 25 MG: 25 TABLET, EXTENDED RELEASE ORAL at 08:17

## 2024-08-10 RX ADMIN — Medication 10 ML: at 21:05

## 2024-08-10 RX ADMIN — EZETIMIBE 10 MG: 10 TABLET ORAL at 08:17

## 2024-08-10 ASSESSMENT — PAIN - FUNCTIONAL ASSESSMENT: PAIN_FUNCTIONAL_ASSESSMENT: ACTIVITIES ARE NOT PREVENTED

## 2024-08-10 ASSESSMENT — PAIN DESCRIPTION - LOCATION: LOCATION: LEG;HIP

## 2024-08-10 ASSESSMENT — PAIN DESCRIPTION - ONSET: ONSET: ON-GOING

## 2024-08-10 ASSESSMENT — PAIN SCALES - GENERAL
PAINLEVEL_OUTOF10: 8
PAINLEVEL_OUTOF10: 0
PAINLEVEL_OUTOF10: 5

## 2024-08-10 ASSESSMENT — PAIN DESCRIPTION - PAIN TYPE: TYPE: ACUTE PAIN

## 2024-08-10 ASSESSMENT — PAIN DESCRIPTION - ORIENTATION: ORIENTATION: RIGHT

## 2024-08-10 ASSESSMENT — PAIN DESCRIPTION - FREQUENCY: FREQUENCY: INTERMITTENT

## 2024-08-11 LAB
ALBUMIN SERPL-MCNC: 3.3 G/DL (ref 3.5–5)
ALBUMIN/GLOB SERPL: 1.1 (ref 1.1–2.2)
ALP SERPL-CCNC: 86 U/L (ref 45–117)
ALT SERPL-CCNC: 38 U/L (ref 12–78)
ANION GAP SERPL CALC-SCNC: 5 MMOL/L (ref 5–15)
AST SERPL-CCNC: 18 U/L (ref 15–37)
BASOPHILS # BLD: 0.1 K/UL (ref 0–0.1)
BASOPHILS NFR BLD: 1 % (ref 0–1)
BILIRUB SERPL-MCNC: 1.3 MG/DL (ref 0.2–1)
BUN SERPL-MCNC: 47 MG/DL (ref 6–20)
BUN/CREAT SERPL: 24 (ref 12–20)
CALCIUM SERPL-MCNC: 8.9 MG/DL (ref 8.5–10.1)
CHLORIDE SERPL-SCNC: 106 MMOL/L (ref 97–108)
CO2 SERPL-SCNC: 28 MMOL/L (ref 21–32)
CREAT SERPL-MCNC: 1.92 MG/DL (ref 0.7–1.3)
DIFFERENTIAL METHOD BLD: ABNORMAL
EOSINOPHIL # BLD: 0.4 K/UL (ref 0–0.4)
EOSINOPHIL NFR BLD: 4 % (ref 0–7)
ERYTHROCYTE [DISTWIDTH] IN BLOOD BY AUTOMATED COUNT: 16.8 % (ref 11.5–14.5)
GLOBULIN SER CALC-MCNC: 3 G/DL (ref 2–4)
GLUCOSE SERPL-MCNC: 118 MG/DL (ref 65–100)
HCT VFR BLD AUTO: 33.9 % (ref 36.6–50.3)
HGB BLD-MCNC: 10.9 G/DL (ref 12.1–17)
IMM GRANULOCYTES # BLD AUTO: 0 K/UL (ref 0–0.04)
IMM GRANULOCYTES NFR BLD AUTO: 0 % (ref 0–0.5)
LYMPHOCYTES # BLD: 3 K/UL (ref 0.8–3.5)
LYMPHOCYTES NFR BLD: 30 % (ref 12–49)
MCH RBC QN AUTO: 33.6 PG (ref 26–34)
MCHC RBC AUTO-ENTMCNC: 32.2 G/DL (ref 30–36.5)
MCV RBC AUTO: 104.6 FL (ref 80–99)
MONOCYTES # BLD: 1.3 K/UL (ref 0–1)
MONOCYTES NFR BLD: 13 % (ref 5–13)
NEUTS SEG # BLD: 5.4 K/UL (ref 1.8–8)
NEUTS SEG NFR BLD: 52 % (ref 32–75)
NRBC # BLD: 0.03 K/UL (ref 0–0.01)
NRBC BLD-RTO: 0.3 PER 100 WBC
PLATELET # BLD AUTO: 153 K/UL (ref 150–400)
PMV BLD AUTO: 11.4 FL (ref 8.9–12.9)
POTASSIUM SERPL-SCNC: 4.7 MMOL/L (ref 3.5–5.1)
PROT SERPL-MCNC: 6.3 G/DL (ref 6.4–8.2)
RBC # BLD AUTO: 3.24 M/UL (ref 4.1–5.7)
SODIUM SERPL-SCNC: 139 MMOL/L (ref 136–145)
WBC # BLD AUTO: 10.2 K/UL (ref 4.1–11.1)

## 2024-08-11 PROCEDURE — 6370000000 HC RX 637 (ALT 250 FOR IP): Performed by: STUDENT IN AN ORGANIZED HEALTH CARE EDUCATION/TRAINING PROGRAM

## 2024-08-11 PROCEDURE — 36415 COLL VENOUS BLD VENIPUNCTURE: CPT

## 2024-08-11 PROCEDURE — 6370000000 HC RX 637 (ALT 250 FOR IP)

## 2024-08-11 PROCEDURE — 85025 COMPLETE CBC W/AUTO DIFF WBC: CPT

## 2024-08-11 PROCEDURE — 80053 COMPREHEN METABOLIC PANEL: CPT

## 2024-08-11 PROCEDURE — 6370000000 HC RX 637 (ALT 250 FOR IP): Performed by: PHYSICIAN ASSISTANT

## 2024-08-11 PROCEDURE — 94640 AIRWAY INHALATION TREATMENT: CPT

## 2024-08-11 PROCEDURE — 6360000002 HC RX W HCPCS: Performed by: STUDENT IN AN ORGANIZED HEALTH CARE EDUCATION/TRAINING PROGRAM

## 2024-08-11 PROCEDURE — 1100000000 HC RM PRIVATE

## 2024-08-11 PROCEDURE — 6370000000 HC RX 637 (ALT 250 FOR IP): Performed by: NURSE PRACTITIONER

## 2024-08-11 RX ADMIN — APIXABAN 2.5 MG: 5 TABLET, FILM COATED ORAL at 21:08

## 2024-08-11 RX ADMIN — FUROSEMIDE 20 MG: 20 TABLET ORAL at 17:10

## 2024-08-11 RX ADMIN — ARFORMOTEROL TARTRATE: 15 SOLUTION RESPIRATORY (INHALATION) at 20:20

## 2024-08-11 RX ADMIN — DORZOLAMIDE HYDROCHLORIDE 1 DROP: 20 SOLUTION/ DROPS OPHTHALMIC at 09:11

## 2024-08-11 RX ADMIN — EZETIMIBE 10 MG: 10 TABLET ORAL at 09:10

## 2024-08-11 RX ADMIN — METOPROLOL SUCCINATE 25 MG: 25 TABLET, EXTENDED RELEASE ORAL at 09:08

## 2024-08-11 RX ADMIN — OXYCODONE HYDROCHLORIDE 2.5 MG: 5 TABLET ORAL at 12:45

## 2024-08-11 RX ADMIN — ATORVASTATIN CALCIUM 20 MG: 40 TABLET, FILM COATED ORAL at 09:08

## 2024-08-11 RX ADMIN — HYDROXYZINE HYDROCHLORIDE 25 MG: 25 TABLET, FILM COATED ORAL at 21:08

## 2024-08-11 RX ADMIN — TIMOLOL MALEATE 1 DROP: 5 SOLUTION OPHTHALMIC at 09:11

## 2024-08-11 RX ADMIN — FUROSEMIDE 20 MG: 20 TABLET ORAL at 09:09

## 2024-08-11 RX ADMIN — ACETAMINOPHEN 650 MG: 325 TABLET ORAL at 12:45

## 2024-08-11 RX ADMIN — ACETAMINOPHEN 650 MG: 325 TABLET ORAL at 09:08

## 2024-08-11 RX ADMIN — FINASTERIDE 5 MG: 5 TABLET, FILM COATED ORAL at 09:10

## 2024-08-11 RX ADMIN — DORZOLAMIDE HYDROCHLORIDE 1 DROP: 20 SOLUTION/ DROPS OPHTHALMIC at 21:08

## 2024-08-11 RX ADMIN — PANTOPRAZOLE SODIUM 40 MG: 40 TABLET, DELAYED RELEASE ORAL at 09:09

## 2024-08-11 RX ADMIN — ACETAMINOPHEN 650 MG: 325 TABLET ORAL at 21:07

## 2024-08-11 RX ADMIN — OXYCODONE HYDROCHLORIDE 2.5 MG: 5 TABLET ORAL at 17:15

## 2024-08-11 RX ADMIN — VALSARTAN 20 MG: 40 TABLET, FILM COATED ORAL at 09:09

## 2024-08-11 RX ADMIN — TIMOLOL MALEATE 1 DROP: 5 SOLUTION OPHTHALMIC at 21:08

## 2024-08-11 RX ADMIN — HYDROXYZINE HYDROCHLORIDE 25 MG: 25 TABLET, FILM COATED ORAL at 11:37

## 2024-08-11 RX ADMIN — APIXABAN 2.5 MG: 5 TABLET, FILM COATED ORAL at 09:08

## 2024-08-11 ASSESSMENT — PAIN SCALES - GENERAL
PAINLEVEL_OUTOF10: 4
PAINLEVEL_OUTOF10: 5
PAINLEVEL_OUTOF10: 3

## 2024-08-11 ASSESSMENT — PAIN DESCRIPTION - DESCRIPTORS
DESCRIPTORS: ACHING
DESCRIPTORS: ACHING

## 2024-08-11 ASSESSMENT — PAIN DESCRIPTION - ORIENTATION
ORIENTATION: LEFT
ORIENTATION: LEFT

## 2024-08-11 ASSESSMENT — PAIN DESCRIPTION - LOCATION
LOCATION: HIP
LOCATION: HIP

## 2024-08-11 ASSESSMENT — PAIN - FUNCTIONAL ASSESSMENT: PAIN_FUNCTIONAL_ASSESSMENT: ACTIVITIES ARE NOT PREVENTED

## 2024-08-12 VITALS
RESPIRATION RATE: 16 BRPM | OXYGEN SATURATION: 96 % | BODY MASS INDEX: 21.54 KG/M2 | SYSTOLIC BLOOD PRESSURE: 102 MMHG | HEART RATE: 70 BPM | DIASTOLIC BLOOD PRESSURE: 61 MMHG | WEIGHT: 159 LBS | TEMPERATURE: 97.9 F | HEIGHT: 72 IN

## 2024-08-12 PROCEDURE — 6370000000 HC RX 637 (ALT 250 FOR IP)

## 2024-08-12 PROCEDURE — 6370000000 HC RX 637 (ALT 250 FOR IP): Performed by: STUDENT IN AN ORGANIZED HEALTH CARE EDUCATION/TRAINING PROGRAM

## 2024-08-12 PROCEDURE — 2580000003 HC RX 258: Performed by: STUDENT IN AN ORGANIZED HEALTH CARE EDUCATION/TRAINING PROGRAM

## 2024-08-12 PROCEDURE — 6370000000 HC RX 637 (ALT 250 FOR IP): Performed by: PHYSICIAN ASSISTANT

## 2024-08-12 PROCEDURE — 6370000000 HC RX 637 (ALT 250 FOR IP): Performed by: NURSE PRACTITIONER

## 2024-08-12 PROCEDURE — 97530 THERAPEUTIC ACTIVITIES: CPT

## 2024-08-12 RX ORDER — OXYCODONE HYDROCHLORIDE 5 MG/1
2.5 TABLET ORAL EVERY 4 HOURS PRN
Qty: 8 TABLET | Refills: 0 | Status: SHIPPED | OUTPATIENT
Start: 2024-08-12 | End: 2024-08-15

## 2024-08-12 RX ORDER — FINASTERIDE 5 MG/1
5 TABLET, FILM COATED ORAL DAILY
Qty: 30 TABLET | Refills: 0 | Status: SHIPPED
Start: 2024-08-13

## 2024-08-12 RX ADMIN — DORZOLAMIDE HYDROCHLORIDE 1 DROP: 20 SOLUTION/ DROPS OPHTHALMIC at 08:45

## 2024-08-12 RX ADMIN — EZETIMIBE 10 MG: 10 TABLET ORAL at 08:44

## 2024-08-12 RX ADMIN — TIMOLOL MALEATE 1 DROP: 5 SOLUTION OPHTHALMIC at 08:45

## 2024-08-12 RX ADMIN — FUROSEMIDE 20 MG: 20 TABLET ORAL at 08:45

## 2024-08-12 RX ADMIN — APIXABAN 2.5 MG: 5 TABLET, FILM COATED ORAL at 08:44

## 2024-08-12 RX ADMIN — Medication 10 ML: at 08:46

## 2024-08-12 RX ADMIN — ACETAMINOPHEN 650 MG: 325 TABLET ORAL at 02:00

## 2024-08-12 RX ADMIN — PANTOPRAZOLE SODIUM 40 MG: 40 TABLET, DELAYED RELEASE ORAL at 06:50

## 2024-08-12 RX ADMIN — VALSARTAN 20 MG: 40 TABLET, FILM COATED ORAL at 08:45

## 2024-08-12 RX ADMIN — ATORVASTATIN CALCIUM 20 MG: 40 TABLET, FILM COATED ORAL at 08:45

## 2024-08-12 RX ADMIN — METOPROLOL SUCCINATE 25 MG: 25 TABLET, EXTENDED RELEASE ORAL at 08:44

## 2024-08-12 RX ADMIN — FINASTERIDE 5 MG: 5 TABLET, FILM COATED ORAL at 08:45

## 2024-08-12 RX ADMIN — ACETAMINOPHEN 650 MG: 325 TABLET ORAL at 08:45

## 2024-08-12 ASSESSMENT — PAIN DESCRIPTION - LOCATION
LOCATION: HIP
LOCATION: HIP

## 2024-08-12 ASSESSMENT — PAIN SCALES - GENERAL
PAINLEVEL_OUTOF10: 3
PAINLEVEL_OUTOF10: 3

## 2024-08-12 ASSESSMENT — PAIN DESCRIPTION - PAIN TYPE: TYPE: ACUTE PAIN

## 2024-08-12 ASSESSMENT — PAIN - FUNCTIONAL ASSESSMENT: PAIN_FUNCTIONAL_ASSESSMENT: PREVENTS OR INTERFERES WITH MANY ACTIVE NOT PASSIVE ACTIVITIES

## 2024-08-12 ASSESSMENT — PAIN DESCRIPTION - ORIENTATION
ORIENTATION: RIGHT
ORIENTATION: RIGHT

## 2024-08-12 ASSESSMENT — PAIN DESCRIPTION - DESCRIPTORS
DESCRIPTORS: ACHING
DESCRIPTORS: ACHING

## 2024-08-12 NOTE — DISCHARGE INSTRUCTIONS
Discharge Instructions       PATIENT ID: Vinicius Calles  MRN: [id number]   YOB: 1932    DATE OF ADMISSION: 8/7/2024   DATE OF DISCHARGE: 8/12/2024    PRIMARY CARE PROVIDER: Millie Pal     ATTENDING PHYSICIAN: Blanche Todd MD   DISCHARGING PROVIDER: Samantha Castellon PA-C    To contact this individual call [phone] and ask the  to page.   If unavailable ask to be transferred the Adult Hospitalist Department.    DISCHARGE DIAGNOSES     Acute, minimally displaced fracture of the right pubis and right  superior pubic ramus.  Has been evaluated by orthopedics, appreciate recommendations  Working with PT/OT  No surgical needs  Continuing multimodal pain control     Hematoma lateral to the right greater trochanter  Hemoglobin stable  Cleared by orthopedics to start apixaban     Afib  Rate currently controlled  Continuing metoprolol  Regarding hypercoagulable state due to atrial fibrillation, restarting apixaban         HTN  Stable  Continuing metoprolol  Continuing valsartan        Chronic systolic congestive heart failure  Last ejection fraction 50 to 55%  Continuing metoprolol and valsartan as well as furosemide  Does not appear to be in exacerbation       CAD / Dyslipidemia  -cont bb and statin      CONSULTATIONS: [unfilled]    PROCEDURES/SURGERIES: * No surgery found *    PENDING TEST RESULTS:   At the time of discharge the following test results are still pending: n/a    FOLLOW UP APPOINTMENTS:   [unfilled]     ADDITIONAL CARE RECOMMENDATIONS:   Return if pain worsens or does not resolve  Follow up with PCP    DIET: regular diet    ACTIVITY: activity as tolerated    WOUND CARE: x    EQUIPMENT needed: x      DISCHARGE MEDICATIONS:   See Medication Reconciliation Form    It is important that you take the medication exactly as they are prescribed.   Keep your medication in the bottles provided by the pharmacist and keep a list of the medication names, dosages, and times to be

## 2024-08-12 NOTE — CARE COORDINATION
Transition of Care Plan to SNF/Rehab    Communication to Patient/Family:  Met with patient and family and they are agreeable to the transition plan. The Plan for Transition of Care is related to the following treatment goals: sNF    The Patient and/or patient representative was provided with a choice of provider and agrees  with the discharge plan.      Yes [x] No []    A Freedom of choice list was provided with basic dialogue that supports the patient's individualized plan of care/goals and shares the quality data associated with the providers.       Yes [x] No []    SNF/Rehab Transition:  Patient has been accepted to [facility] and meets criteria for admission.   Patient will transported by [facility] and expected to leave at 3    Communication to SNF/Rehab:  Bedside RN, Padmini, has been notified to update the transition plan to the facility and call report (phone number).  Discharge information has been updated on the AVS. And communicated to facility via [employer], or CC link.     Discharge instructions to be fax'd to facility at (Fax #). [facility]    Nursing Please include all hard scripts for controlled substances, med rec and dc summary, and AVS in packet.     Reviewed and confirmed with facility, [facility], can manage the patient care needs for the following:     Stephan with (X) only those applicable:  Medication:  [x]Medications are available at the facility  []IV Antibiotics    []Controlled Substance - hard copies available sent.  []Weekly Labs    Equipment:  []CPAP/BiPAP  []Wound Vacuum  []Erickson or Urinary Device  []PICC/Central Line  []Nebulizer  []Ventilator    Treatment:  []Isolation (for MRSA, VRE, etc.)  []Surgical Drain Management  []Tracheostomy Care  []Dressing Changes  []Dialysis with transportation  []PEG Care  []Oxygen  []Daily Weights for Heart Failure    Dietary:  []Any diet limitations  []Tube Feedings   []Total Parenteral Management (TPN)    Financial Resources:  []Medicaid

## 2024-08-12 NOTE — PLAN OF CARE
Problem: Discharge Planning  Goal: Discharge to home or other facility with appropriate resources  8/10/2024 0911 by Padmini Christie RN  Outcome: Progressing  8/9/2024 2153 by Malissa Montilla LPN  Outcome: Progressing     Problem: Pain  Goal: Verbalizes/displays adequate comfort level or baseline comfort level  8/10/2024 0911 by Padmini Christie RN  Outcome: Progressing  8/9/2024 2153 by Malissa Montilla LPN  Outcome: Progressing     Problem: Skin/Tissue Integrity  Goal: Absence of new skin breakdown  Description: 1.  Monitor for areas of redness and/or skin breakdown  2.  Assess vascular access sites hourly  3.  Every 4-6 hours minimum:  Change oxygen saturation probe site  4.  Every 4-6 hours:  If on nasal continuous positive airway pressure, respiratory therapy assess nares and determine need for appliance change or resting period.  8/10/2024 0911 by Padmini Christie RN  Outcome: Progressing  8/9/2024 2153 by Malissa Montilla LPN  Outcome: Progressing     Problem: Safety - Adult  Goal: Free from fall injury  8/10/2024 0911 by Padmini Christie RN  Outcome: Progressing  8/9/2024 2153 by Malissa Montilla LPN  Outcome: Progressing     Problem: ABCDS Injury Assessment  Goal: Absence of physical injury  8/10/2024 0911 by Padmini Christie RN  Outcome: Progressing  8/9/2024 2153 by Malissa Montilla LPN  Outcome: Progressing     Problem: ABCDS Injury Assessment  Goal: Absence of physical injury  8/10/2024 0911 by Padmini Christie RN  Outcome: Progressing  8/9/2024 2153 by Malissa Montilla LPN  Outcome: Progressing     Problem: Chronic Conditions and Co-morbidities  Goal: Patient's chronic conditions and co-morbidity symptoms are monitored and maintained or improved  8/10/2024 0911 by Padmini Christie RN  Outcome: Progressing  8/9/2024 2153 by Malissa Montilla LPN  Outcome: Progressing     
  Problem: Discharge Planning  Goal: Discharge to home or other facility with appropriate resources  Outcome: Progressing     Problem: Pain  Goal: Verbalizes/displays adequate comfort level or baseline comfort level  Outcome: Progressing     Problem: Skin/Tissue Integrity  Goal: Absence of new skin breakdown  Description: 1.  Monitor for areas of redness and/or skin breakdown  2.  Assess vascular access sites hourly  3.  Every 4-6 hours minimum:  Change oxygen saturation probe site  4.  Every 4-6 hours:  If on nasal continuous positive airway pressure, respiratory therapy assess nares and determine need for appliance change or resting period.  Outcome: Progressing     Problem: Safety - Adult  Goal: Free from fall injury  Outcome: Progressing     Problem: ABCDS Injury Assessment  Goal: Absence of physical injury  Outcome: Progressing     
  Problem: Discharge Planning  Goal: Discharge to home or other facility with appropriate resources  Outcome: Progressing     Problem: Pain  Goal: Verbalizes/displays adequate comfort level or baseline comfort level  Outcome: Progressing     Problem: Skin/Tissue Integrity  Goal: Absence of new skin breakdown  Description: 1.  Monitor for areas of redness and/or skin breakdown  2.  Assess vascular access sites hourly  3.  Every 4-6 hours minimum:  Change oxygen saturation probe site  4.  Every 4-6 hours:  If on nasal continuous positive airway pressure, respiratory therapy assess nares and determine need for appliance change or resting period.  Outcome: Progressing     Problem: Safety - Adult  Goal: Free from fall injury  Outcome: Progressing     Problem: ABCDS Injury Assessment  Goal: Absence of physical injury  Outcome: Progressing     Problem: Chronic Conditions and Co-morbidities  Goal: Patient's chronic conditions and co-morbidity symptoms are monitored and maintained or improved  Outcome: Progressing     
  Problem: Discharge Planning  Goal: Discharge to home or other facility with appropriate resources  Outcome: Progressing     Problem: Pain  Goal: Verbalizes/displays adequate comfort level or baseline comfort level  Outcome: Progressing     Problem: Skin/Tissue Integrity  Goal: Absence of new skin breakdown  Description: 1.  Monitor for areas of redness and/or skin breakdown  2.  Assess vascular access sites hourly  3.  Every 4-6 hours minimum:  Change oxygen saturation probe site  4.  Every 4-6 hours:  If on nasal continuous positive airway pressure, respiratory therapy assess nares and determine need for appliance change or resting period.  Outcome: Progressing     Problem: Safety - Adult  Goal: Free from fall injury  Outcome: Progressing     Problem: ABCDS Injury Assessment  Goal: Absence of physical injury  Outcome: Progressing     Problem: Chronic Conditions and Co-morbidities  Goal: Patient's chronic conditions and co-morbidity symptoms are monitored and maintained or improved  Outcome: Progressing     Problem: Physical Therapy - Adult  Goal: By Discharge: Performs mobility at highest level of function for planned discharge setting.  See evaluation for individualized goals.  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 Patient reports no additional falls.    HOME SUPPORT PRIOR TO ADMISSION: The patient lived with his wife but did not require assistance.    Physical Therapy Goals  Initiated 8/8/2024  1.  Patient will move from supine to sit and sit to supine, scoot up and down, and roll side to side in bed with modified independence within 7 day(s).    2.  Patient will perform sit to stand with modified independence within 7 day(s).  3.  Patient will transfer from bed to chair and chair to bed with contact guard assist using the least restrictive device within 7 day(s).  4.  Patient will ambulate with contact guard 
  Problem: Discharge Planning  Goal: Discharge to home or other facility with appropriate resources  Recent Flowsheet Documentation  Taken 8/8/2024 0123 by Honey Laguerre RN  Discharge to home or other facility with appropriate resources: Identify barriers to discharge with patient and caregiver     Problem: Pain  Goal: Verbalizes/displays adequate comfort level or baseline comfort level  Outcome: Progressing     Problem: Skin/Tissue Integrity  Goal: Absence of new skin breakdown  Description: 1.  Monitor for areas of redness and/or skin breakdown  2.  Assess vascular access sites hourly  3.  Every 4-6 hours minimum:  Change oxygen saturation probe site  4.  Every 4-6 hours:  If on nasal continuous positive airway pressure, respiratory therapy assess nares and determine need for appliance change or resting period.  Outcome: Progressing     Problem: Safety - Adult  Goal: Free from fall injury  Outcome: Progressing     Problem: ABCDS Injury Assessment  Goal: Absence of physical injury  Outcome: Progressing     
  Problem: Occupational Therapy - Adult  Goal: By Discharge: Performs self-care activities at highest level of function for planned discharge setting.  See evaluation for individualized goals.  Description: FUNCTIONAL STATUS PRIOR TO ADMISSION:  Pt is independent at baseline. Pt lives with spouse.   , ADL Assistance: Independent,  ,  ,  ,  ,  ,  , Ambulation Assistance: Independent, Transfer Assistance: Independent, Active : Yes     HOME SUPPORT: Patient lived with spouse but didn't require assistance.    Occupational Therapy Goals:  Initiated 8/8/2024  1.  Patient will perform lower body dressing with Modified [place] within 7 day(s).  2.  Patient will perform grooming standing at sink with Contact Guard Assist within 7 day(s).  3.  Patient will perform toilet transfers with Minimal Assist  within 7 day(s).  4.  Patient will perform all aspects of toileting with Minimal Assist within 7 day(s).  5.  Patient will participate in upper extremity therapeutic exercise/activities with Modified [place] for 10 minutes within 7 day(s).    6.  Patient will utilize energy conservation techniques during functional activities with verbal cues within 7 day(s).    Outcome: Progressing   OCCUPATIONAL THERAPY TREATMENT  Patient: Vinicius Calles (89 y.o. male)  Date: 8/9/2024  Primary Diagnosis: Hematoma of right hip, initial encounter [S70.01XA]  Hip pain, acute, right [M25.551]  Closed pelvic straddle injury with pelvic fracture, sequela [S32.9XXS]  Closed fracture of superior ramus of right pubis, initial encounter ([facility]) [S32.511A]  Hematoma [T14.8XXA]       Precautions: Weight Bearing Right Lower Extremity Weight Bearing: Weight Bearing As Tolerated              Chart, occupational therapy assessment, plan of care, and goals were reviewed.    ASSESSMENT  Patient continues to benefit from skilled OT services and is progressing towards goals. Pt presented seated EOB agreeable to therapy. Pt demonstrates increased 
  Problem: Pain  Goal: Verbalizes/displays adequate comfort level or baseline comfort level  Outcome: Progressing     Problem: Skin/Tissue Integrity  Goal: Absence of new skin breakdown  Description: 1.  Monitor for areas of redness and/or skin breakdown  2.  Assess vascular access sites hourly  3.  Every 4-6 hours minimum:  Change oxygen saturation probe site  4.  Every 4-6 hours:  If on nasal continuous positive airway pressure, respiratory therapy assess nares and determine need for appliance change or resting period.  Outcome: Progressing     Problem: Safety - Adult  Goal: Free from fall injury  Outcome: Progressing          
  Problem: Physical Therapy - Adult  Goal: By Discharge: Performs mobility at highest level of function for planned discharge setting.  See evaluation for individualized goals.  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    HOME SUPPORT PRIOR TO ADMISSION: The patient lived with his wife but did not require assistance.    Physical Therapy Goals  Initiated 8/8/2024  1.  Patient will move from supine to sit and sit to supine, scoot up and down, and roll side to side in bed with modified independence within 7 day(s).    2.  Patient will perform sit to stand with modified independence within 7 day(s).  3.  Patient will transfer from bed to chair and chair to bed with contact guard assist using the least restrictive device within 7 day(s).  4.  Patient will ambulate with contact guard assist for 50 feet with the least restrictive device within 7 day(s).   5.  Patient will ascend/descend 5 stairs with 1 handrail(s) with minimal assistance within 7 day(s).   Outcome: Progressing   PHYSICAL THERAPY TREATMENT    Patient: Vinicius Calles (89 y.o. male)  Date: 8/9/2024  Diagnosis: Hematoma of right hip, initial encounter [S70.01XA]  Hip pain, acute, right [M25.551]  Closed pelvic straddle injury with pelvic fracture, sequela [S32.9XXS]  Closed fracture of superior ramus of right pubis, initial encounter ([facility]) [S32.511A]  Hematoma [T14.8XXA] Closed pelvic straddle injury with pelvic fracture, sequela      Precautions: Weight Bearing Right Lower Extremity Weight Bearing: Weight Bearing As Tolerated                    ASSESSMENT:  Patient continues to benefit from skilled PT services and is progressing towards goals. Patient continues to be limited by pain, gait instability, impaired balance and decreased activity tolerance.  Currently needing SBA to come to stand from bed.  Able to increase gait 
Problem: Occupational Therapy - Adult  Goal: By Discharge: Performs self-care activities at highest level of function for planned discharge setting.  See evaluation for individualized goals.  Description: FUNCTIONAL STATUS PRIOR TO ADMISSION:  Pt is independent at baseline. Pt lives with spouse.   , ADL Assistance: Independent,  ,  ,  ,  ,  ,  , Ambulation Assistance: Independent, Transfer Assistance: Independent, Active : Yes     HOME SUPPORT: Patient lived with spouse but didn't require assistance.    Occupational Therapy Goals:  Initiated 8/8/2024  1.  Patient will perform lower body dressing with Modified [place] within 7 day(s).  2.  Patient will perform grooming standing at sink with Contact Guard Assist within 7 day(s).  3.  Patient will perform toilet transfers with Minimal Assist  within 7 day(s).  4.  Patient will perform all aspects of toileting with Minimal Assist within 7 day(s).  5.  Patient will participate in upper extremity therapeutic exercise/activities with Modified [place] for 10 minutes within 7 day(s).    6.  Patient will utilize energy conservation techniques during functional activities with verbal cues within 7 day(s).    Outcome: Progressing   OCCUPATIONAL THERAPY TREATMENT  Patient: Vinicius Calles (89 y.o. male)  Date: 8/12/2024  Primary Diagnosis: Hematoma of right hip, initial encounter [S70.01XA]  Hip pain, acute, right [M25.551]  Closed pelvic straddle injury with pelvic fracture, sequela [S32.9XXS]  Closed fracture of superior ramus of right pubis, initial encounter ([facility]) [S32.511A]  Hematoma [T14.8XXA]       Precautions: Weight Bearing Right Lower Extremity Weight Bearing: Weight Bearing As Tolerated              Chart, occupational therapy assessment, plan of care, and goals were reviewed.    ASSESSMENT  Patient continues to benefit from skilled OT services and is progressing towards goals. Pt presented seated EOB at first declining any activity due to arranging 
decreased  Gait Abnormalities: Antalgic;Decreased step clearance;Step to gait;Other (comment) (R knee in flexion)                                                                                                                                                                                                                                                                [facility] AM-PAC®      Basic Mobility Inpatient Short Form (6-Clicks) Version 2    How much help is needed turning from your back to your side while in a flat bed without using bedrails?: None  How much help is needed moving from lying on your back to sitting on the side of a flat bed without using bedrails?: None  How much help is needed moving to and from a bed to a chair?: A Little  How much help is needed standing up from a chair using your arms?: A Little  How much help is needed walking in hospital room?: A Lot  How much help is needed climbing 3-5 steps with a railing?: A Lot    -PAC Inpatient Mobility Raw Score : 18  -PAC Inpatient T-Scale Score : 43.63     Cutoff score ?171,2,3 had higher odds of discharging home with home health or need of SNF/IPR.    1. Ambar Arambula, Giselle Quiroz, Rodrigo Pearson, Erica Mandujano, Santana Yu, Collin Arambula.  Validity of the -PAC “6-Clicks” Inpatient Daily Activity and Basic Mobility Short Forms. Physical Therapy Mar 2014, 94 (3) 379-391; DOI: 10.2522/ptj.37061116  2. Oli HOLDER, Rae J, Peewee J, Suman FULLER. Association of AM-PAC \"6-Clicks\" Basic Mobility and Daily Activity Scores With Discharge Destination. Phys Ther. 2021 Apr 4;101(4):nyjd870. doi: 10.1093/ptj/fkmb257. PMID: 09362890.  3. David FULLER, Abdi D, Carrie S, Lili K, Koko S. Activity Measure for Post-Acute Care \"6-Clicks\" Basic Mobility Scores Predict Discharge Destination After Acute Care Hospitalization in Select Patient Groups: A Retrospective, Observational Study. Arch Rehabil Res Clin Transl. 2022 Jul 
Decision-Making   LOW Complexity : Brief history review  LOW Complexity: 1-3 Performance deficits relating to physical, cognitive, or psychosocial skills that result in activity limitations and/or participation restrictions LOW Complexity: No comorbidities that affect functional and  no verbal  or physical assist needed to complete eval tasks   Based on the above components, the patient evaluation is determined to be of the following complexity level: Low

## 2024-08-12 NOTE — CARE COORDINATION
Transition of Care Plan:    CM received message from [place] and [phone] that insurance auth approved but [facility] is not in network with this [employer] plan.  Ref # [id number] auth started 8/9.    CM called [place] and [facility] and was told that a one time exception might be possible.  CM has not received call back with answer.    UPDATE: [facility] accepted for today. Into room 112  This facility is in network with insurance.  [facility] scheduled for 3pm.    RN call report to [phone]     RUR: 14%  Prior Level of Functioning: independent  Disposition: SNF  If SNF or IPR: Date FOC offered: 8/8  Date FOC received: 8/9  Accepting facility: [facility]  Date authorization started with reference number: 8/9 ref# [id number]   Date authorization received and expires:   Follow up appointments: per physician recommendation   DME needed: Defer to SNF  Transportation at discharge: TBD  IM/IMM Medicare/ letter given:   Caregiver Contact: Ramon Calles [phone]  Barriers to discharge: insurance auth, medical stability        Patient has been accepted to both [facility]. [facility] is choice.      CM called [facility] [phone] to start auth. Ref # [id number] auth started 8/9. [facility] aware Auth has started. CM faxed clinicals to [phone].     Myriam Schrader RN/CRM  [phone]

## 2024-08-12 NOTE — PROGRESS NOTES
Hospitalist Progress Note  JEFFERSON Chavarria NP  Answering service: [phone] OR [phone] from in house phone        Date of Service:  8/10/2024  NAME:  Vinicius Calles  :  1932  MRN:  [id number]      Admission Summary:   Per H&P   Vinicius Calles is a 89 y.o. male with hx of cad, ckd, chf, gerd, htn, glaucoma, bph, afib on eliquis who presented to ed for evaluation after a fall. Lost his balance and fell on his left hip. No head injury or LOC. Seen at different hospital and discharged home but returns to ed with worsening pain and inability to bear weight on his RLE.  The patient denies any fever, chills, chest or abdominal pain, nausea, vomiting, cough, congestion, recent illness, palpitations, or dysuria.     Remarkable vitals on ER Presentation: vss  Labs Remarkable for: wbc 13.2, cr 2.2  ER Images: ct pelvis: Acute, minimally displaced fracture of the right pubis and right  superior pubic ramus. No additional fracture visualized. Hematoma lateral to the  right greater trochanter.  ER Rx: morphine, ascencion    Interval history / Subjective:     I saw the patient this morning on rounds.  He was up in the chair at the moment of the encounter.  No complaints at that time     Assessment & Plan:         Acute, minimally displaced fracture of the right pubis and right  superior pubic ramus.  Has been evaluated by orthopedics, appreciate recommendations  Working with PT/OT  No surgical needs  Continuing multimodal pain control           Hematoma lateral to the right greater trochanter  Hemoglobin stable  Cleared by orthopedics to start apixaban    Afib  Rate currently controlled  Continuing metoprolol  Regarding hypercoagulable state due to atrial fibrillation, restarting apixaban         HTN  Stable  Continuing metoprolol  Continuing valsartan         Chronic systolic congestive heart failure  Last ejection fraction 50 
                                                                                                Hospitalist Progress Note  JEFFERSON Chavarria NP  Answering service: [phone] OR [phone] from in house phone        Date of Service:  2024  NAME:  Vinicius Calles  :  1932  MRN:  [id number]      Admission Summary:   Per H&P   Vinicius Calles is a 89 y.o. male with hx of cad, ckd, chf, gerd, htn, glaucoma, bph, afib on eliquis who presented to ed for evaluation after a fall. Lost his balance and fell on his left hip. No head injury or LOC. Seen at different hospital and discharged home but returns to ed with worsening pain and inability to bear weight on his RLE.  The patient denies any fever, chills, chest or abdominal pain, nausea, vomiting, cough, congestion, recent illness, palpitations, or dysuria.     Remarkable vitals on ER Presentation: vss  Labs Remarkable for: wbc 13.2, cr 2.2  ER Images: ct pelvis: Acute, minimally displaced fracture of the right pubis and right  superior pubic ramus. No additional fracture visualized. Hematoma lateral to the  right greater trochanter.  ER Rx: morphine, ascencion    Interval history / Subjective:     I saw the patient this morning on rounds     Assessment & Plan:         Acute, minimally displaced fracture of the right pubis and right  superior pubic ramus.  Has been evaluated by orthopedics, appreciate recommendations  Working with PT/OT  No surgical needs  Continuing multimodal pain control           Hematoma lateral to the right greater trochanter  Hemoglobin stable  Cleared by orthopedics to start apixaban    Afib  Rate currently controlled  Continuing metoprolol  Regarding hypercoagulable state due to atrial fibrillation, restarting apixaban         HTN  Stable  Continuing metoprolol  Continuing valsartan         Chronic systolic congestive heart failure  Last ejection fraction 50 to 55%  Continuing metoprolol and valsartan as well as furosemide  Does not appear to 
                                                                                                Hospitalist Progress Note  JEFFERSON Chavarria NP  Answering service: [phone] OR [phone] from in house phone        Date of Service:  2024  NAME:  Vinicius Calles  :  1932  MRN:  [id number]      Admission Summary:   Per H&P   Vinicius Calles is a 89 y.o. male with hx of cad, ckd, chf, gerd, htn, glaucoma, bph, afib on eliquis who presented to ed for evaluation after a fall. Lost his balance and fell on his left hip. No head injury or LOC. Seen at different hospital and discharged home but returns to ed with worsening pain and inability to bear weight on his RLE.  The patient denies any fever, chills, chest or abdominal pain, nausea, vomiting, cough, congestion, recent illness, palpitations, or dysuria.     Remarkable vitals on ER Presentation: vss  Labs Remarkable for: wbc 13.2, cr 2.2  ER Images: ct pelvis: Acute, minimally displaced fracture of the right pubis and right  superior pubic ramus. No additional fracture visualized. Hematoma lateral to the  right greater trochanter.  ER Rx: morphine, ascencion    Interval history / Subjective:   I saw patient this morning on rounds.  Does have pain 1/10 at rest however 9/10 with movement     Assessment & Plan:         Acute, minimally displaced fracture of the right pubis and right  superior pubic ramus.  Has been evaluated by orthopedics, appreciate recommendations  Working with PT/OT  No surgical needs  Continuing multimodal pain control           Hematoma lateral to the right greater trochanter  Hemoglobin stable  Cleared by orthopedics to start apixaban    Afib  Rate currently controlled  Continuing metoprolol  Regarding hypercoagulable state due to atrial fibrillation, restarting apixaban         HTN  Stable  Continuing metoprolol  Continuing valsartan         Chronic systolic congestive heart failure  Last ejection fraction 50 to 55%  Continuing metoprolol and 
2am Vitals, SBP 93.    Asked patient if this is normal for him, stated yes. Informed that taking oxy 2.5mg may be better at this time due to BP.     Patient still requesting 5mg, will monitor BP.  
Brief Ortho    89yo M patient on Eliquis known to [facility] providers who reportedly fell at a Drs office today and had to be helped back into his car due to right hip pain. Drove himself home and went to a hospital near his home for evaluation. Plain imaging completed there with no obvious fractures and further imaging reportedly declined by patient per chart review. Still unable to bear weight with significant right hip pain and came to [facility] for further imaging. Per ED attending (Sandra) patient with ongoing right hip pain with any movement and unable to stand or bear weight.    CT of pelvis shows Right pubix and pubic ramus fractures with hematoma overlying the greater troch laterally.    No surgical interventions necessary or planned.  Patient can be WBAT with walker through RLE  Pain control will be key - Tylenol with oxycodone as needed  Ice packs to right hip as needed  If unable to mobilize for discharge can be admitted for PT/OT eval and placement  Will need outpatient follow-up in the next 3-4 wks for re-evaluation  Patient to be formally seen in the morning.    Dr Jovon Aquino aware of patient and in agreement with current plan    Ben Saenz PA-C  Orthopedic Trauma Service  [facility]    
I called jasmine twice to give report,they put me hold for 4 min then after they hang the frankie.  
Occupational Therapy  08/08/24    Orders received, chart reviewed and patient evaluated by occupational therapy. Pending progression with skilled acute occupational therapy, recommend:  Therapy 3 hours/day 5-7 days/week    Recommend with nursing patient to complete as able in order to maintain strength, endurance and independence: OOB to chair 3x/day, ADLs with supervision/setup and mobilizing to the BSC using RW Keith assist. Thank you for your assistance.     Full evaluation to follow.   Ny Orellana, OT    
Patient was discharged,discharged instruction was given to the patient.  
Physical therapy:    Attempted PT session. Pt received seated EOB and declined therapy services at this time after reporting he is trying to make phone calls to [facility] to discuss his heart valve. Offered to assist pt to the chair for back support, but pt declined and  entered room. Will defer and continue to follow. Thank you.    Cecilia Cortes, PT, DPT    
Basophils % 1 0 - 1 %    Immature Granulocytes % 1 (H) 0.0 - 0.5 %    Neutrophils Absolute 8.1 (H) 1.8 - 8.0 K/UL    Lymphocytes Absolute 2.6 0.8 - 3.5 K/UL    Monocytes Absolute 1.4 (H) 0.0 - 1.0 K/UL    Eosinophils Absolute 0.3 0.0 - 0.4 K/UL    Basophils Absolute 0.1 0.0 - 0.1 K/UL    Immature Granulocytes Absolute 0.1 (H) 0.00 - 0.04 K/UL    Differential Type AUTOMATED     Comprehensive Metabolic Panel w/ Reflex to MG    Collection Time: 08/09/24  3:52 AM   Result Value Ref Range    Sodium 137 136 - 145 mmol/L    Potassium 3.8 3.5 - 5.1 mmol/L    Chloride 106 97 - 108 mmol/L    CO2 27 21 - 32 mmol/L    Anion Gap 4 (L) 5 - 15 mmol/L    Glucose 117 (H) 65 - 100 mg/dL    BUN 53 (H) 6 - 20 MG/DL    Creatinine 1.78 (H) 0.70 - 1.30 MG/DL    BUN/Creatinine Ratio 30 (H) 12 - 20      Est, Glom Filt Rate 36 (L) >60 ml/min/1.73m2    Calcium 8.7 8.5 - 10.1 MG/DL    Total Bilirubin 1.7 (H) 0.2 - 1.0 MG/DL    ALT 47 12 - 78 U/L    AST 24 15 - 37 U/L    Alk Phosphatase 85 45 - 117 U/L    Total Protein 7.0 6.4 - 8.2 g/dL    Albumin 3.6 3.5 - 5.0 g/dL    Globulin 3.4 2.0 - 4.0 g/dL    Albumin/Globulin Ratio 1.1 1.1 - 2.2     Troponin    Collection Time: 08/09/24  3:52 AM   Result Value Ref Range    Troponin, High Sensitivity 35 0 - 76 ng/L               
care coordination needs.     Labs:     Recent Labs     08/10/24  0703 08/11/24  0559   WBC 11.1 10.2   HGB 10.9* 10.9*   HCT 33.7* 33.9*    153     Recent Labs     08/09/24 0352 08/10/24  0703 08/11/24  0559    140 139   K 3.8 4.3 4.7    106 106   CO2 27 27 28   BUN 53* 50* 47*     Recent Labs     08/09/24  0352 08/10/24  0703 08/11/24  0559   ALT 47 43 38   GLOB 3.4 3.0 3.0     No results for input(s): \"INR\", \"APTT\" in the last 72 hours.    Invalid input(s): \"PTP\"   No results for input(s): \"TIBC\" in the last 72 hours.    Invalid input(s): \"FE\", \"PSAT\", \"FERR\"   No results found for: \"RBCF\"   No results for input(s): \"PH\", \"PCO2\", \"PO2\" in the last 72 hours.  No results for input(s): \"CPK\" in the last 72 hours.    Invalid input(s): \"CPKMB\", \"CKNDX\", \"TROIQ\"  Lab Results   Component Value Date/Time    CHOL 109 08/23/2021 01:31 AM    HDL 62 08/23/2021 01:31 AM    LDL 34 08/23/2021 01:31 AM     No results found for: \"GLUCPOC\"        Medications Reviewed:     Current Facility-Administered Medications   Medication Dose Route Frequency    ipratropium 0.5 mg-albuterol 2.5 mg (DUONEB) nebulizer solution 1 Dose  1 Dose Inhalation Q4H PRN    dorzolamide (TRUSOPT) 2 % ophthalmic solution 1 drop  1 drop Ophthalmic BID    And    timolol (TIMOPTIC) 0.5 % ophthalmic solution 1 drop  1 drop Both Eyes BID    apixaban (ELIQUIS) tablet 2.5 mg  2.5 mg Oral BID    furosemide (LASIX) tablet 20 mg  20 mg Oral BID    acetaminophen (TYLENOL) tablet 650 mg  650 mg Oral Q6H    oxyCODONE (ROXICODONE) immediate release tablet 2.5 mg  2.5 mg Oral Q4H PRN    Or    oxyCODONE (ROXICODONE) immediate release tablet 5 mg  5 mg Oral Q4H PRN    morphine (PF) injection 2 mg  2 mg IntraVENous Q4H PRN    valsartan (DIOVAN) tablet 20 mg  20 mg Oral Daily    finasteride (PROSCAR) tablet 5 mg  5 mg Oral Daily    ezetimibe (ZETIA) tablet 10 mg  10 mg Oral Daily    arformoterol 15 mcg-budesonide 0.25 mg neb solution   Nebulization BID RT

## 2024-08-12 NOTE — DISCHARGE SUMMARY
Discharge Summary       PATIENT ID: Vinicius Calles  MRN: [id number]   YOB: 1932    DATE OF ADMISSION: 8/7/2024  9:26 PM    DATE OF DISCHARGE: 8/12/24   PRIMARY CARE PROVIDER: Millie Pal MD     ATTENDING PHYSICIAN: GALLO Todd MD  DISCHARGING PROVIDER: Samantha Castellon PA-C    To contact this individual call [phone] and ask the  to page.  If unavailable ask to be transferred the Adult Hospitalist Department.    CONSULTATIONS: IP CONSULT TO CASE MANAGEMENT  IP CONSULT TO CARDIOLOGY    PROCEDURES/SURGERIES: * No surgery found *     ADMITTING DIAGNOSES & HOSPITAL COURSE:     Vinicius Calles is a 89 y.o. male with hx of cad, ckd, chf, gerd, htn, glaucoma, bph, afib on eliquis who presented to ed for evaluation after a fall. Lost his balance and fell on his left hip. No head injury or LOC. Seen at different hospital and discharged home but returns to ed with worsening pain and inability to bear weight on his RLE.  The patient denies any fever, chills, chest or abdominal pain, nausea, vomiting, cough, congestion, recent illness, palpitations, or dysuria.     Remarkable vitals on ER Presentation: vss  Labs Remarkable for: wbc 13.2, cr 2.2  ER Images: ct pelvis: Acute, minimally displaced fracture of the right pubis and right  superior pubic ramus. No additional fracture visualized. Hematoma lateral to the  right greater trochanter.  ER Rx: morphine, ascencion      8/12  Patient examined this am. He is eager to leave as soon as possible. Denies pain, n/v. 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  Return precautions provided. Patient verbalizes understanding and agrees with the plan.       DISCHARGE DIAGNOSES / PLAN:      Acute, minimally displaced fracture of the right pubis and right  superior pubic ramus.  Has been evaluated by orthopedics, appreciate recommendations  Working with PT/OT  No surgical needs  Continuing multimodal pain control     Hematoma lateral to the right greater trochanter  Hemoglobin

## 2024-12-26 ENCOUNTER — TELEPHONE (OUTPATIENT)
Age: 89
End: 2024-12-26

## 2024-12-26 NOTE — TELEPHONE ENCOUNTER
Reason for call: TC patient.  Pt's pcp  will retire soon. Patient would like to be scheduled with . Patient was adamant on me sending a message to see if  would take him on as a new patient.     Is this a new problem: No    Date of last appointment:  Visit date not found     Can we respond via SeeMediat: Yes    Best call back number:      Vinicius Calles (Self) [phone] (Mobile)

## 2025-01-06 ENCOUNTER — APPOINTMENT (OUTPATIENT)
Facility: HOSPITAL | Age: 89
DRG: 193 | End: 2025-01-06
Payer: MEDICARE

## 2025-01-06 ENCOUNTER — HOSPITAL ENCOUNTER (INPATIENT)
Facility: HOSPITAL | Age: 89
LOS: 1 days | Discharge: HOME | DRG: 193 | End: 2025-01-07
Attending: EMERGENCY MEDICINE | Admitting: INTERNAL MEDICINE
Payer: MEDICARE

## 2025-01-06 DIAGNOSIS — R06.02 SHORTNESS OF BREATH: ICD-10-CM

## 2025-01-06 DIAGNOSIS — N28.9 ACUTE RENAL INSUFFICIENCY: ICD-10-CM

## 2025-01-06 DIAGNOSIS — J10.1 INFLUENZA A: Primary | ICD-10-CM

## 2025-01-06 DIAGNOSIS — I50.9 ACUTE CONGESTIVE HEART FAILURE, UNSPECIFIED HEART FAILURE TYPE (HCC): ICD-10-CM

## 2025-01-06 DIAGNOSIS — J96.01 ACUTE RESPIRATORY FAILURE WITH HYPOXIA: ICD-10-CM

## 2025-01-06 LAB
ALBUMIN SERPL-MCNC: 3.8 G/DL (ref 3.5–5)
ALBUMIN/GLOB SERPL: 1 (ref 1.1–2.2)
ALP SERPL-CCNC: 113 U/L (ref 45–117)
ALT SERPL-CCNC: 19 U/L (ref 12–78)
ANION GAP SERPL CALC-SCNC: 12 MMOL/L (ref 2–12)
AST SERPL-CCNC: 22 U/L (ref 15–37)
BASOPHILS # BLD: 0.1 K/UL (ref 0–0.1)
BASOPHILS NFR BLD: 0 % (ref 0–1)
BILIRUB SERPL-MCNC: 1.2 MG/DL (ref 0.2–1)
BUN SERPL-MCNC: 63 MG/DL (ref 6–20)
BUN/CREAT SERPL: 25 (ref 12–20)
CALCIUM SERPL-MCNC: 9 MG/DL (ref 8.5–10.1)
CHLORIDE SERPL-SCNC: 95 MMOL/L (ref 97–108)
CO2 SERPL-SCNC: 31 MMOL/L (ref 21–32)
COMMENT:: NORMAL
CREAT SERPL-MCNC: 2.54 MG/DL (ref 0.7–1.3)
DIFFERENTIAL METHOD BLD: ABNORMAL
EKG ATRIAL RATE: 131 BPM
EKG DIAGNOSIS: NORMAL
EKG Q-T INTERVAL: 518 MS
EKG QRS DURATION: 180 MS
EKG QTC CALCULATION (BAZETT): 567 MS
EKG R AXIS: -75 DEGREES
EKG T AXIS: 97 DEGREES
EKG VENTRICULAR RATE: 72 BPM
EOSINOPHIL # BLD: 0.1 K/UL (ref 0–0.4)
EOSINOPHIL NFR BLD: 1 % (ref 0–7)
ERYTHROCYTE [DISTWIDTH] IN BLOOD BY AUTOMATED COUNT: 16.5 % (ref 11.5–14.5)
FLUAV RNA SPEC QL NAA+PROBE: DETECTED
FLUBV RNA SPEC QL NAA+PROBE: NOT DETECTED
GLOBULIN SER CALC-MCNC: 3.7 G/DL (ref 2–4)
GLUCOSE SERPL-MCNC: 129 MG/DL (ref 65–100)
HCT VFR BLD AUTO: 35.7 % (ref 36.6–50.3)
HGB BLD-MCNC: 11.6 G/DL (ref 12.1–17)
IMM GRANULOCYTES # BLD AUTO: 0.1 K/UL (ref 0–0.04)
IMM GRANULOCYTES NFR BLD AUTO: 1 % (ref 0–0.5)
LYMPHOCYTES # BLD: 1.7 K/UL (ref 0.8–3.5)
LYMPHOCYTES NFR BLD: 14 % (ref 12–49)
MCH RBC QN AUTO: 32.8 PG (ref 26–34)
MCHC RBC AUTO-ENTMCNC: 32.5 G/DL (ref 30–36.5)
MCV RBC AUTO: 100.8 FL (ref 80–99)
MONOCYTES # BLD: 1 K/UL (ref 0–1)
MONOCYTES NFR BLD: 9 % (ref 5–13)
NEUTS SEG # BLD: 9.1 K/UL (ref 1.8–8)
NEUTS SEG NFR BLD: 75 % (ref 32–75)
NRBC # BLD: 0.03 K/UL (ref 0–0.01)
NRBC BLD-RTO: 0.2 PER 100 WBC
NT PRO BNP: 9410 PG/ML (ref 0–450)
PLATELET # BLD AUTO: 159 K/UL (ref 150–400)
PMV BLD AUTO: 12.5 FL (ref 8.9–12.9)
POTASSIUM SERPL-SCNC: 3.6 MMOL/L (ref 3.5–5.1)
PROT SERPL-MCNC: 7.5 G/DL (ref 6.4–8.2)
RBC # BLD AUTO: 3.54 M/UL (ref 4.1–5.7)
SARS-COV-2 RNA RESP QL NAA+PROBE: NOT DETECTED
SODIUM SERPL-SCNC: 138 MMOL/L (ref 136–145)
SOURCE: ABNORMAL
SPECIMEN HOLD: NORMAL
TROPONIN I SERPL HS-MCNC: 36 NG/L (ref 0–76)
WBC # BLD AUTO: 12.1 K/UL (ref 4.1–11.1)

## 2025-01-06 PROCEDURE — 99285 EMERGENCY DEPT VISIT HI MDM: CPT

## 2025-01-06 PROCEDURE — 93010 ELECTROCARDIOGRAM REPORT: CPT | Performed by: INTERNAL MEDICINE

## 2025-01-06 PROCEDURE — 71046 X-RAY EXAM CHEST 2 VIEWS: CPT

## 2025-01-06 PROCEDURE — 2500000003 HC RX 250 WO HCPCS: Performed by: INTERNAL MEDICINE

## 2025-01-06 PROCEDURE — 93005 ELECTROCARDIOGRAM TRACING: CPT | Performed by: STUDENT IN AN ORGANIZED HEALTH CARE EDUCATION/TRAINING PROGRAM

## 2025-01-06 PROCEDURE — 85025 COMPLETE CBC W/AUTO DIFF WBC: CPT

## 2025-01-06 PROCEDURE — 6360000002 HC RX W HCPCS: Performed by: INTERNAL MEDICINE

## 2025-01-06 PROCEDURE — 84484 ASSAY OF TROPONIN QUANT: CPT

## 2025-01-06 PROCEDURE — 80053 COMPREHEN METABOLIC PANEL: CPT

## 2025-01-06 PROCEDURE — 1200000000 HC SEMI PRIVATE

## 2025-01-06 PROCEDURE — 6370000000 HC RX 637 (ALT 250 FOR IP): Performed by: INTERNAL MEDICINE

## 2025-01-06 PROCEDURE — 83880 ASSAY OF NATRIURETIC PEPTIDE: CPT

## 2025-01-06 PROCEDURE — 36415 COLL VENOUS BLD VENIPUNCTURE: CPT

## 2025-01-06 PROCEDURE — 87636 SARSCOV2 & INF A&B AMP PRB: CPT

## 2025-01-06 PROCEDURE — 94640 AIRWAY INHALATION TREATMENT: CPT

## 2025-01-06 RX ORDER — ALBUTEROL SULFATE 0.83 MG/ML
2.5 SOLUTION RESPIRATORY (INHALATION) EVERY 6 HOURS PRN
Status: DISCONTINUED | OUTPATIENT
Start: 2025-01-06 | End: 2025-01-07 | Stop reason: HOSPADM

## 2025-01-06 RX ORDER — ONDANSETRON 4 MG/1
4 TABLET, ORALLY DISINTEGRATING ORAL EVERY 8 HOURS PRN
Status: CANCELLED | OUTPATIENT
Start: 2025-01-06

## 2025-01-06 RX ORDER — POLYETHYLENE GLYCOL 3350 17 G/17G
17 POWDER, FOR SOLUTION ORAL DAILY PRN
Status: DISCONTINUED | OUTPATIENT
Start: 2025-01-06 | End: 2025-01-07 | Stop reason: HOSPADM

## 2025-01-06 RX ORDER — BUMETANIDE 1 MG/1
2 TABLET ORAL 2 TIMES DAILY
COMMUNITY

## 2025-01-06 RX ORDER — METOPROLOL SUCCINATE 25 MG/1
25 TABLET, EXTENDED RELEASE ORAL DAILY
Status: DISCONTINUED | OUTPATIENT
Start: 2025-01-06 | End: 2025-01-07 | Stop reason: HOSPADM

## 2025-01-06 RX ORDER — OSELTAMIVIR PHOSPHATE 6 MG/ML
30 FOR SUSPENSION ORAL ONCE
Status: DISCONTINUED | OUTPATIENT
Start: 2025-01-06 | End: 2025-01-06

## 2025-01-06 RX ORDER — ALBUTEROL SULFATE 90 UG/1
2 INHALANT RESPIRATORY (INHALATION) EVERY 6 HOURS PRN
Status: DISCONTINUED | OUTPATIENT
Start: 2025-01-06 | End: 2025-01-06

## 2025-01-06 RX ORDER — SODIUM CHLORIDE 9 MG/ML
INJECTION, SOLUTION INTRAVENOUS PRN
Status: DISCONTINUED | OUTPATIENT
Start: 2025-01-06 | End: 2025-01-07 | Stop reason: HOSPADM

## 2025-01-06 RX ORDER — TIMOLOL MALEATE 5 MG/ML
1 SOLUTION/ DROPS OPHTHALMIC 2 TIMES DAILY
Status: DISCONTINUED | OUTPATIENT
Start: 2025-01-06 | End: 2025-01-07 | Stop reason: HOSPADM

## 2025-01-06 RX ORDER — DORZOLAMIDE HYDROCHLORIDE AND TIMOLOL MALEATE 20; 5 MG/ML; MG/ML
1 SOLUTION/ DROPS OPHTHALMIC 2 TIMES DAILY
Status: DISCONTINUED | OUTPATIENT
Start: 2025-01-06 | End: 2025-01-06

## 2025-01-06 RX ORDER — PANTOPRAZOLE SODIUM 40 MG/1
40 TABLET, DELAYED RELEASE ORAL
Status: DISCONTINUED | OUTPATIENT
Start: 2025-01-07 | End: 2025-01-07 | Stop reason: HOSPADM

## 2025-01-06 RX ORDER — ONDANSETRON 2 MG/ML
4 INJECTION INTRAMUSCULAR; INTRAVENOUS EVERY 6 HOURS PRN
Status: DISCONTINUED | OUTPATIENT
Start: 2025-01-06 | End: 2025-01-07 | Stop reason: HOSPADM

## 2025-01-06 RX ORDER — OSELTAMIVIR PHOSPHATE 30 MG/1
30 CAPSULE ORAL ONCE
Status: DISCONTINUED | OUTPATIENT
Start: 2025-01-06 | End: 2025-01-06 | Stop reason: SDUPTHER

## 2025-01-06 RX ORDER — SODIUM CHLORIDE 0.9 % (FLUSH) 0.9 %
5-40 SYRINGE (ML) INJECTION PRN
Status: DISCONTINUED | OUTPATIENT
Start: 2025-01-06 | End: 2025-01-07 | Stop reason: HOSPADM

## 2025-01-06 RX ORDER — ENOXAPARIN SODIUM 100 MG/ML
30 INJECTION SUBCUTANEOUS DAILY
Status: CANCELLED | OUTPATIENT
Start: 2025-01-06

## 2025-01-06 RX ORDER — ACETAMINOPHEN 650 MG/1
650 SUPPOSITORY RECTAL EVERY 6 HOURS PRN
Status: CANCELLED | OUTPATIENT
Start: 2025-01-06

## 2025-01-06 RX ORDER — FINASTERIDE 5 MG/1
5 TABLET, FILM COATED ORAL DAILY
Status: DISCONTINUED | OUTPATIENT
Start: 2025-01-06 | End: 2025-01-07 | Stop reason: HOSPADM

## 2025-01-06 RX ORDER — EZETIMIBE 10 MG/1
10 TABLET ORAL DAILY
Status: DISCONTINUED | OUTPATIENT
Start: 2025-01-06 | End: 2025-01-07 | Stop reason: HOSPADM

## 2025-01-06 RX ORDER — OSELTAMIVIR PHOSPHATE 30 MG/1
30 CAPSULE ORAL DAILY
Status: DISCONTINUED | OUTPATIENT
Start: 2025-01-06 | End: 2025-01-07 | Stop reason: HOSPADM

## 2025-01-06 RX ORDER — SODIUM CHLORIDE 0.9 % (FLUSH) 0.9 %
5-40 SYRINGE (ML) INJECTION EVERY 12 HOURS SCHEDULED
Status: DISCONTINUED | OUTPATIENT
Start: 2025-01-06 | End: 2025-01-07 | Stop reason: HOSPADM

## 2025-01-06 RX ORDER — DORZOLAMIDE HCL 20 MG/ML
1 SOLUTION/ DROPS OPHTHALMIC 3 TIMES DAILY
Status: DISCONTINUED | OUTPATIENT
Start: 2025-01-06 | End: 2025-01-07 | Stop reason: HOSPADM

## 2025-01-06 RX ORDER — BUSPIRONE HYDROCHLORIDE 5 MG/1
5 TABLET ORAL 3 TIMES DAILY PRN
Status: DISCONTINUED | OUTPATIENT
Start: 2025-01-06 | End: 2025-01-07 | Stop reason: HOSPADM

## 2025-01-06 RX ORDER — TRAZODONE HYDROCHLORIDE 50 MG/1
25 TABLET, FILM COATED ORAL NIGHTLY
COMMUNITY

## 2025-01-06 RX ORDER — ACETAMINOPHEN 325 MG/1
650 TABLET ORAL EVERY 6 HOURS PRN
Status: DISCONTINUED | OUTPATIENT
Start: 2025-01-06 | End: 2025-01-07 | Stop reason: HOSPADM

## 2025-01-06 RX ADMIN — DORZOLAMIDE HYDROCHLORIDE 1 DROP: 20 SOLUTION OPHTHALMIC at 20:10

## 2025-01-06 RX ADMIN — ARFORMOTEROL TARTRATE: 15 SOLUTION RESPIRATORY (INHALATION) at 19:45

## 2025-01-06 RX ADMIN — Medication 3 MG: at 21:52

## 2025-01-06 RX ADMIN — TIMOLOL MALEATE 1 DROP: 5 SOLUTION/ DROPS OPHTHALMIC at 20:10

## 2025-01-06 RX ADMIN — EZETIMIBE 10 MG: 10 TABLET ORAL at 17:30

## 2025-01-06 RX ADMIN — OSELTAMIVIR PHOSPHATE 30 MG: 30 CAPSULE ORAL at 14:52

## 2025-01-06 RX ADMIN — METOPROLOL SUCCINATE 25 MG: 25 TABLET, EXTENDED RELEASE ORAL at 17:30

## 2025-01-06 RX ADMIN — APIXABAN 2.5 MG: 2.5 TABLET, FILM COATED ORAL at 20:09

## 2025-01-06 RX ADMIN — FINASTERIDE 5 MG: 5 TABLET, FILM COATED ORAL at 17:29

## 2025-01-06 RX ADMIN — SODIUM CHLORIDE, PRESERVATIVE FREE 10 ML: 5 INJECTION INTRAVENOUS at 20:10

## 2025-01-06 RX ADMIN — BUSPIRONE HYDROCHLORIDE 5 MG: 5 TABLET ORAL at 15:55

## 2025-01-06 ASSESSMENT — ENCOUNTER SYMPTOMS
SHORTNESS OF BREATH: 1
COUGH: 1

## 2025-01-06 ASSESSMENT — PAIN - FUNCTIONAL ASSESSMENT: PAIN_FUNCTIONAL_ASSESSMENT: NONE - DENIES PAIN

## 2025-01-06 NOTE — ED PROVIDER NOTES
[facility]  EMERGENCY DEPARTMENT ENCOUNTER      Pt Name: Vinicius Calles  MRN: [id number]  Birthdate 11/14/1932  Date of evaluation: 1/6/2025  Provider: Leo Easley MD    CHIEF COMPLAINT       Chief Complaint   Patient presents with    Shortness of Breath    Asthma         HISTORY OF PRESENT ILLNESS   (Location/Symptom, Timing/Onset, Context/Setting, Quality, Duration, Modifying Factors, Severity)  Note limiting factors.   Mr. Calles is a 89-year-old male who presents to the ER with complaints of shortness of breath.  He said that he has a history of tricuspid regurgitation with surgery for this last month.  He has had continued shortness of breath in the morning.  This morning, it seemed to be worse than previous.  He could not go to sleep to the shortness of breath.  He tried to go outside into the cold and do some other things at home to help.  However, he still felt short of breath.  Therefore, he called 911.  When they arrived, his room air oxygen saturation was 88%.  He does not use oxygen at home.  He also reports that he has had a \"upper respiratory tract infection\" for the last 2 days.  His wife is have the same.  Reports having a cough.  No runny nose.  No fevers.  He has some reported bilateral pedal edema that is unchanged.  He denies any other complaints.            Review of External Medical Records:     Nursing Notes were reviewed.    REVIEW OF SYSTEMS    (2-9 systems for level 4, 10 or more for level 5)     Review of Systems   Respiratory:  Positive for cough and shortness of breath.        Except as noted above the remainder of the review of systems was reviewed and negative.       PAST MEDICAL HISTORY     Past Medical History:   Diagnosis Date    Arthritis     knee, ankle    CAD (coronary artery disease)     CHF (congestive heart failure) (HCC)     Chronic kidney disease     Chronic obstructive pulmonary disease (HCC)     denies    GERD (gastroesophageal reflux disease)     Heart

## 2025-01-06 NOTE — CONSULTS
CARDIOLOGY CONSULT                 Assessment:     Assessment:       Principal Problem:    Influenza A  Resolved Problems:    * No resolved hospital problems. 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 PAF: RFA  Afib 1/2021 and 9/20211 at [facility]    sp dccv time 4   previous rx with amiodarone  ( last DCCV 7/25/2024)      Eliquis 2.5 based on age and creatinine of 1.4-1.5     intolerant amiodarone  Gi upset   2. Hypertension  3. Dyslipidemia  4. Tobacco Use: Cigarette  5. Peripheral Vascular Disease:  sp l fem pop and pop aneurysm repair 11/11/2008  6. CHF  HfmEF  ef 40-45%   7.  CKD  cr   1.68     8.  HX Pulm embolism  2017 provoked   9.  Pacemaker:  MDT implanted August 29, 2023 after asystolic event uva 8/20/2023   10. Cerebellar infarct remote  11.  Tricuspid clip placement [facility] 11/7/2024   12. 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  Pelvic fx  treated non operatively 8/23/2024 1/6/2025  stated did not take amiodarone and canceled DCCV due to inefficacy and gi upset with amiodarone    now sob going to [facility] ED       Rv failure appears to be biggest issue  Repeat echo and Dr Reed to katina rao result  Will have advance CHF evaluate  Only thing that I could offer is upgrade to biv pacing  Afib management has been relegated to rate control due to failure with DCCV and amiodaorne to maintain nsr.  No other aa meds justified due to renal fx    2.  SYL:  worsened           Subjective:    Date of  Admission: 1/6/2025  6:51 AM     Admission type:Emergency    Vinicius Calles is a 89 y.o. male admitted for Influenza A [J10.1]  Acute renal insufficiency [N28.9]  Acute respiratory failure with hypoxia [J96.01]  Acute congestive heart failure, unspecified heart failure type (HCC) [I50.9].Echocardiogram from 12/19/2024 demonstrates ejection fraction 60 65% mild RV enlargement with reduced RV function mild AS

## 2025-01-06 NOTE — H&P
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 EMS noted SpO2 88% on RA and applied 4L O2 NC. Wife is sick with a URI. Influenza A was positive. Pt was admitted to [facility] and transferred to [facility] where he was seen/examined. Pt has chronic SOB due to tricuspid regurg. Pt denies f/c/n/v, headaches, dizziness, lightheadedness, falls, injuries, LOC, CP, palpitations, abdominal pain, diarrhea, constipation, dysuria, body aches, night sweats, weight changes. Pt c/o cough productive of mild sputum. [place]      REVIEW OF SYSTEMS:  Pertinent items are noted in the History of Present Illness.     Past Medical History:   Diagnosis Date    Arthritis     knee, ankle    CAD (coronary artery disease)     CHF (congestive heart failure) (HCC)     Chronic kidney disease     Chronic obstructive pulmonary disease (HCC)     denies    GERD (gastroesophageal reflux disease)     Heart failure (HCC)     Hypertension     Ill-defined condition     glaucoma      Past Surgical History:   Procedure Laterality Date    APPENDECTOMY      CARDIAC PROCEDURE N/A 1/19/2024    Left heart cath / coronary angiography performed by Alirio Hernández MD at [facility] CARDIAC CATH LAB    COLONOSCOPY N/A 1/9/2017    COLONOSCOPY,HEMORRHOIDECTOMY   performed by Vinicius Arita MD at [facility] MAIN OR    GI      HEENT Bilateral     cataracts    HEENT Right     iridectomy    PACEMAKER INSERTION      August 2023    PROSTATECTOMY      pt denies    VASCULAR

## 2025-01-06 NOTE — PLAN OF CARE
Problem: Chronic Conditions and Co-morbidities  Goal: Patient's chronic conditions and co-morbidity symptoms are monitored and maintained or improved  Outcome: Progressing  Flowsheets (Taken 1/6/2025 1316)  Care Plan - Patient's Chronic Conditions and Co-Morbidity Symptoms are Monitored and Maintained or Improved:   Monitor and assess patient's chronic conditions and comorbid symptoms for stability, deterioration, or improvement   Collaborate with multidisciplinary team to address chronic and comorbid conditions and prevent exacerbation or deterioration

## 2025-01-06 NOTE — ED NOTES
TRANSFER - OUT REPORT:    Verbal report given to ANA Farfan on Vinicius Calles  being transferred to [facility] [address] for routine progression of patient care       Report consisted of patient's Situation, Background, Assessment and   Recommendations(SBAR).     Information from the following report(s) ED SBAR was reviewed with the receiving nurse.    Ellie Fall Assessment:    Presents to emergency department  because of falls (Syncope, seizure, or loss of consciousness): No  Age > 70: Yes  Altered Mental Status, Intoxication with alcohol or substance confusion (Disorientation, impaired judgment, poor safety awaremess, or inability to follow instructions): No  Impaired Mobility: Ambulates or transfers with assistive devices or assistance; Unable to ambulate or transer.: No  Nursing Judgement: No          Lines:   Peripheral IV 01/06/25 Proximal;Right;Anterior Forearm (Active)        Opportunity for questions and clarification was provided.      Patient transported with:  O2 @ 4lpm, Cardiac Monitor

## 2025-01-06 NOTE — ED TRIAGE NOTES
[place] Emergency Room Nursing Note        Patient Name: Vinicius Calles      : 1932             MRN: [id number]      Chief Complaint:  Shortness of Breath and Asthma      Admit Diagnosis: No admission diagnoses are documented for this encounter.      Admitting Provider: No admitting provider for patient encounter.      Surgery: * No surgery found *           Patient arrived to the ER via EMS from home with complaints of SOB that started last night. Pt discloses a Hx of A_Fib on Eloquis, Asthma & Tricuspid Regurg. Arrived on 4L O2 NC @ 93%. Pt states having a recent Pelvic Fx 2.5 months ago.         Lines:        Signed by: Ajith Tang RN, PAWAN, BSN, CMSRN                                              2025 at 7:00 AM

## 2025-01-06 NOTE — ED NOTES
Mr. Calles is anxious stating that he is unable to sleep and request that ED MD prescribe something for sleep. Dr. Easley made aware and is withholding sleep aid d/t symptoms and SOB. Mr. Calles advised of concerns.     AOX4, anxious regarding sleep. HOB elevated, cardiac monitor in place, O2 via NC that he continues to pull off.

## 2025-01-07 ENCOUNTER — APPOINTMENT (OUTPATIENT)
Facility: HOSPITAL | Age: 89
DRG: 193 | End: 2025-01-07
Attending: INTERNAL MEDICINE
Payer: MEDICARE

## 2025-01-07 VITALS
RESPIRATION RATE: 20 BRPM | DIASTOLIC BLOOD PRESSURE: 58 MMHG | BODY MASS INDEX: 20.39 KG/M2 | SYSTOLIC BLOOD PRESSURE: 103 MMHG | WEIGHT: 150.57 LBS | HEART RATE: 77 BPM | OXYGEN SATURATION: 95 % | HEIGHT: 72 IN | TEMPERATURE: 97.3 F

## 2025-01-07 LAB
ALBUMIN SERPL-MCNC: 3.6 G/DL (ref 3.5–5)
ANION GAP SERPL CALC-SCNC: 10 MMOL/L (ref 2–12)
BASOPHILS # BLD: 0.05 K/UL (ref 0–0.1)
BASOPHILS NFR BLD: 0.3 % (ref 0–1)
BUN SERPL-MCNC: 55 MG/DL (ref 6–20)
BUN/CREAT SERPL: 24 (ref 12–20)
CALCIUM SERPL-MCNC: 9.1 MG/DL (ref 8.5–10.1)
CHLORIDE SERPL-SCNC: 93 MMOL/L (ref 97–108)
CO2 SERPL-SCNC: 31 MMOL/L (ref 21–32)
CREAT SERPL-MCNC: 2.27 MG/DL (ref 0.7–1.3)
DIFFERENTIAL METHOD BLD: ABNORMAL
ECHO AO ASC DIAM: 3.8 CM
ECHO AO ASCENDING AORTA INDEX: 2.01 CM/M2
ECHO AO SINUS VALSALVA DIAM: 4.1 CM
ECHO AO SINUS VALSALVA INDEX: 2.17 CM/M2
ECHO AR MAX VEL PISA: 4.1 M/S
ECHO AV AREA PEAK VELOCITY: 1.8 CM2
ECHO AV AREA VTI: 2.2 CM2
ECHO AV AREA/BSA PEAK VELOCITY: 1 CM2/M2
ECHO AV AREA/BSA VTI: 1.2 CM2/M2
ECHO AV MEAN GRADIENT: 11 MMHG
ECHO AV MEAN VELOCITY: 1.6 M/S
ECHO AV PEAK GRADIENT: 19 MMHG
ECHO AV PEAK VELOCITY: 2.2 M/S
ECHO AV REGURGITANT PHT: 538.3 MILLISECOND
ECHO AV VELOCITY RATIO: 0.59
ECHO AV VTI: 37 CM
ECHO BSA: 1.84 M2
ECHO EST RA PRESSURE: 3 MMHG
ECHO LA DIAMETER INDEX: 2.43 CM/M2
ECHO LA DIAMETER: 4.6 CM
ECHO LV E' LATERAL VELOCITY: 6.65 CM/S
ECHO LV E' SEPTAL VELOCITY: 5.1 CM/S
ECHO LV EF PHYSICIAN: 60 %
ECHO LV FRACTIONAL SHORTENING: 18 % (ref 28–44)
ECHO LV INTERNAL DIMENSION DIASTOLE INDEX: 2.33 CM/M2
ECHO LV INTERNAL DIMENSION DIASTOLIC: 4.4 CM (ref 4.2–5.9)
ECHO LV INTERNAL DIMENSION SYSTOLIC INDEX: 1.9 CM/M2
ECHO LV INTERNAL DIMENSION SYSTOLIC: 3.6 CM
ECHO LV IVSD: 1.3 CM (ref 0.6–1)
ECHO LV MASS 2D: 203 G (ref 88–224)
ECHO LV MASS INDEX 2D: 107.4 G/M2 (ref 49–115)
ECHO LV POSTERIOR WALL DIASTOLIC: 1.2 CM (ref 0.6–1)
ECHO LV RELATIVE WALL THICKNESS RATIO: 0.55
ECHO LVOT AREA: 3.8 CM2
ECHO LVOT AV VTI INDEX: 0.58
ECHO LVOT DIAM: 2.2 CM
ECHO LVOT MEAN GRADIENT: 4 MMHG
ECHO LVOT PEAK GRADIENT: 7 MMHG
ECHO LVOT PEAK VELOCITY: 1.3 M/S
ECHO LVOT STROKE VOLUME INDEX: 43.2 ML/M2
ECHO LVOT SV: 81.7 ML
ECHO LVOT VTI: 21.5 CM
ECHO MV A VELOCITY: 0.39 M/S
ECHO MV AREA PHT: 2.9 CM2
ECHO MV AREA VTI: 1.9 CM2
ECHO MV E DECELERATION TIME (DT): 265.5 MS
ECHO MV E VELOCITY: 1.52 M/S
ECHO MV E/A RATIO: 3.9
ECHO MV E/E' LATERAL: 22.86
ECHO MV E/E' RATIO (AVERAGED): 26.33
ECHO MV E/E' SEPTAL: 29.8
ECHO MV LVOT VTI INDEX: 1.96
ECHO MV MAX VELOCITY: 1.7 M/S
ECHO MV MEAN GRADIENT: 4 MMHG
ECHO MV MEAN VELOCITY: 0.9 M/S
ECHO MV PEAK GRADIENT: 11 MMHG
ECHO MV PRESSURE HALF TIME (PHT): 77 MS
ECHO MV VTI: 42.2 CM
ECHO PULMONARY ARTERY END DIASTOLIC PRESSURE: 7 MMHG
ECHO PV REGURGITANT MAX VELOCITY: 1.3 M/S
ECHO RIGHT VENTRICULAR SYSTOLIC PRESSURE (RVSP): 31 MMHG
ECHO RV TAPSE: 1.5 CM (ref 1.7–?)
ECHO TV REGURGITANT MAX VELOCITY: 2.66 M/S
ECHO TV REGURGITANT PEAK GRADIENT: 28 MMHG
EOSINOPHIL # BLD: 0.01 K/UL (ref 0–0.4)
EOSINOPHIL NFR BLD: 0.1 % (ref 0–7)
ERYTHROCYTE [DISTWIDTH] IN BLOOD BY AUTOMATED COUNT: 16.5 % (ref 11.5–14.5)
FOLATE SERPL-MCNC: 13.9 NG/ML (ref 5–21)
GLUCOSE SERPL-MCNC: 109 MG/DL (ref 65–100)
HCT VFR BLD AUTO: 33.5 % (ref 36.6–50.3)
HGB BLD-MCNC: 10.9 G/DL (ref 12.1–17)
IMM GRANULOCYTES # BLD AUTO: 0.11 K/UL (ref 0–0.04)
IMM GRANULOCYTES NFR BLD AUTO: 0.8 % (ref 0–0.5)
LYMPHOCYTES # BLD: 2.41 K/UL (ref 0.8–3.5)
LYMPHOCYTES NFR BLD: 16.7 % (ref 12–49)
MCH RBC QN AUTO: 33.2 PG (ref 26–34)
MCHC RBC AUTO-ENTMCNC: 32.5 G/DL (ref 30–36.5)
MCV RBC AUTO: 102.1 FL (ref 80–99)
MONOCYTES # BLD: 1.15 K/UL (ref 0–1)
MONOCYTES NFR BLD: 8 % (ref 5–13)
NEUTS SEG # BLD: 10.66 K/UL (ref 1.8–8)
NEUTS SEG NFR BLD: 74.1 % (ref 32–75)
NRBC # BLD: 0.02 K/UL (ref 0–0.01)
NRBC BLD-RTO: 0.1 PER 100 WBC
PHOSPHATE SERPL-MCNC: 3.4 MG/DL (ref 2.6–4.7)
PLATELET # BLD AUTO: 139 K/UL (ref 150–400)
PMV BLD AUTO: 11.8 FL (ref 8.9–12.9)
POTASSIUM SERPL-SCNC: 4.3 MMOL/L (ref 3.5–5.1)
RBC # BLD AUTO: 3.28 M/UL (ref 4.1–5.7)
SODIUM SERPL-SCNC: 134 MMOL/L (ref 136–145)
VIT B12 SERPL-MCNC: 816 PG/ML (ref 193–986)
WBC # BLD AUTO: 14.4 K/UL (ref 4.1–11.1)

## 2025-01-07 PROCEDURE — 6360000002 HC RX W HCPCS: Performed by: INTERNAL MEDICINE

## 2025-01-07 PROCEDURE — 85025 COMPLETE CBC W/AUTO DIFF WBC: CPT

## 2025-01-07 PROCEDURE — 97165 OT EVAL LOW COMPLEX 30 MIN: CPT

## 2025-01-07 PROCEDURE — 80069 RENAL FUNCTION PANEL: CPT

## 2025-01-07 PROCEDURE — 97530 THERAPEUTIC ACTIVITIES: CPT

## 2025-01-07 PROCEDURE — 94640 AIRWAY INHALATION TREATMENT: CPT

## 2025-01-07 PROCEDURE — 97161 PT EVAL LOW COMPLEX 20 MIN: CPT

## 2025-01-07 PROCEDURE — 93306 TTE W/DOPPLER COMPLETE: CPT

## 2025-01-07 PROCEDURE — 2500000003 HC RX 250 WO HCPCS: Performed by: INTERNAL MEDICINE

## 2025-01-07 PROCEDURE — 6370000000 HC RX 637 (ALT 250 FOR IP): Performed by: INTERNAL MEDICINE

## 2025-01-07 PROCEDURE — 82746 ASSAY OF FOLIC ACID SERUM: CPT

## 2025-01-07 PROCEDURE — 82607 VITAMIN B-12: CPT

## 2025-01-07 PROCEDURE — 36415 COLL VENOUS BLD VENIPUNCTURE: CPT

## 2025-01-07 RX ORDER — OSELTAMIVIR PHOSPHATE 30 MG/1
30 CAPSULE ORAL DAILY
Qty: 3 CAPSULE | Refills: 0 | Status: SHIPPED | OUTPATIENT
Start: 2025-01-08 | End: 2025-01-11

## 2025-01-07 RX ADMIN — ARFORMOTEROL TARTRATE: 15 SOLUTION RESPIRATORY (INHALATION) at 07:03

## 2025-01-07 RX ADMIN — PANTOPRAZOLE SODIUM 40 MG: 40 TABLET, DELAYED RELEASE ORAL at 06:51

## 2025-01-07 RX ADMIN — TIMOLOL MALEATE 1 DROP: 5 SOLUTION/ DROPS OPHTHALMIC at 09:16

## 2025-01-07 RX ADMIN — FINASTERIDE 5 MG: 5 TABLET, FILM COATED ORAL at 09:15

## 2025-01-07 RX ADMIN — APIXABAN 2.5 MG: 2.5 TABLET, FILM COATED ORAL at 09:15

## 2025-01-07 RX ADMIN — SODIUM CHLORIDE, PRESERVATIVE FREE 10 ML: 5 INJECTION INTRAVENOUS at 09:17

## 2025-01-07 RX ADMIN — DORZOLAMIDE HYDROCHLORIDE 1 DROP: 20 SOLUTION OPHTHALMIC at 09:16

## 2025-01-07 RX ADMIN — EZETIMIBE 10 MG: 10 TABLET ORAL at 09:16

## 2025-01-07 RX ADMIN — OSELTAMIVIR PHOSPHATE 30 MG: 30 CAPSULE ORAL at 09:16

## 2025-01-07 RX ADMIN — METOPROLOL SUCCINATE 25 MG: 25 TABLET, EXTENDED RELEASE ORAL at 09:16

## 2025-01-07 NOTE — CONSULTS
[facility] Cardiology Consultation    Date of Consult:  01/07/25  Date of Admission: 1/6/2025  Admission type:Emergency    Primary Cardiologist: Dr. Alirio Hernández / Dr. Freddie Richmond  Physician Requesting consult: Dr. Freddie Richmond    Chief Complaint / Reason for Consult:   Influenza A [J10.1]  Acute renal insufficiency [N28.9]  Acute respiratory failure with hypoxia [J96.01]  Acute congestive heart failure, unspecified heart failure type (HCC) [I50.9]    Assessment/Recommendations:    Acute on chronic diastolic CHF  Influenza A infection  Paroxysmal AF/AFL  Macrocytic anemia  Add-on folic acid and vitamin B12 level  Consider outpatient evaluation for Watchman implantation  HTN  HL  PAD  Stage IIIb CKD  Tricuspid regurgitation s/p TriClip at [facility] 11/7/24    He does not appear to have significant RV failure.  His RV size is mildly dilated with low normal RV systolic function.  Supportive care for influenza A per primary team.  Appreciate AHF recommendations for acute on chronic diastolic CHF.    TriClip result looks great -- only trace tricuspid regurgitation.    Suspect that the majority of his symptoms are coming from influenza A infection, pAF/AFL, anemia and diastolic CHF.      No additional input from a structural heart standpoint.    Follow-up with usual cardiologists, Dr. Alirio Hernández and Dr. Freddie Richmond on discharge.    We will sign-off, but are available if additional questions arise.  Thank you for the opportunity to participate in the care of Vinicius Calles and please do not hesitate to contact us should you have any questions.    History of Present Illness:  Vinicius Calles is a 89 y.o. male admitted for Influenza A [J10.1]  Acute renal insufficiency [N28.9]  Acute respiratory failure with hypoxia [J96.01]  Acute congestive heart failure, unspecified heart failure type (HCC) [I50.9].    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

## 2025-01-07 NOTE — PROGRESS NOTES
Patient refusing bed alarm. This RN educated patient on the importance of utilizing call bell/bed alarm to help prevent falls. Patient verbalizes understanding and remains adamant bed alarm is disarmed.    
See Dr. Smith consult     I reviewed with pt and discussed with Dr. Kennedy    Multi component dyspnea but chf is not primary   Pt to have PFTs as outp  Ok to dc and fu with me and Dr Hernández in 3-4 weeks       
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     OBJECTIVE DATA SUMMARY:     Past Medical History:   Diagnosis Date    Arthritis     knee, ankle    CAD (coronary artery disease)     CHF (congestive heart failure) (HCC)     Chronic kidney disease     Chronic obstructive pulmonary disease (HCC)     denies    GERD (gastroesophageal reflux disease)     Heart failure (HCC)     Hypertension     Ill-defined condition     glaucoma     Past Surgical History:   Procedure Laterality Date    APPENDECTOMY      CARDIAC PROCEDURE N/A 1/19/2024    Left heart cath / coronary angiography performed by Alirio Hernández MD at [facility] CARDIAC CATH LAB    COLONOSCOPY N/A 1/9/2017    COLONOSCOPY,HEMORRHOIDECTOMY   performed by Vinicius Arita MD at [facility] MAIN OR    GI      HEENT Bilateral     cataracts    HEENT Right     iridectomy    PACEMAKER INSERTION      August 2023    PROSTATECTOMY      pt denies    VASCULAR SURGERY Bilateral 2012    fem-pop       Prior Level of Function/Environment/Context: Prior Level of Assist for ADLs: Independent, Prior Level of Assist for Homemaking: Independent, Prior Level of Assist for Transfers: Independent, Active : Yes     Expanded or extensive additional review of patient history:   Lives With: Spouse  Type of Home: House  Home Layout: Two level, Bed/Bath upstairs  Home Access: Stairs to enter with rails        Bathroom Shower/Tub: Walk-in shower     Bathroom Equipment: Grab bars in shower     Home Equipment: Cane  Has the patient had two or more falls in the past year or any fall with injury in the past year?: Yes (1 fall and broke pelvis ~2 months 
face to face encounter with this patient and independently examined them on 1/7/2025 as outlined below:          General : alert x 3, awake, no acute distress,   HEENT: PEERL, EOMI, moist mucus membrane, TM clear  Neck: supple, no JVD, no meningeal signs  Chest: Clear to auscultation bilaterally   CVS: S1 S2 heard, Capillary refill less than 2 seconds  Abd: soft/ non tender, non distended, BS physiological,   Ext: no clubbing, no cyanosis, no edema, brisk 2+ DP pulses  Neuro/Psych: pleasant mood and affect, CN 2-12 grossly intact, sensory grossly within normal limit, Strength 5/5 in all extremities, DTR 1+ x 4  Skin: warm     Data Review:    Review and/or order of clinical lab test      I have personally and independently reviewed all pertinent labs, diagnostic studies, imaging, and have provided independent interpretation of the same.     Labs:     Recent Labs     01/06/25  0709 01/07/25  0431   WBC 12.1* 14.4*   HGB 11.6* 10.9*   HCT 35.7* 33.5*    139*     Recent Labs     01/06/25  0709 01/07/25  0431    134*   K 3.6 4.3   CL 95* 93*   CO2 31 31   BUN 63* 55*   PHOS  --  3.4     Recent Labs     01/06/25  0709   ALT 19   GLOB 3.7     No results for input(s): \"INR\", \"APTT\" in the last 72 hours.    Invalid input(s): \"PTP\"   No results for input(s): \"TIBC\" in the last 72 hours.    Invalid input(s): \"FE\", \"PSAT\", \"FERR\"   No results found for: \"RBCF\"   No results for input(s): \"PH\", \"PCO2\", \"PO2\" in the last 72 hours.  No results for input(s): \"CPK\" in the last 72 hours.    Invalid input(s): \"CPKMB\", \"CKNDX\", \"TROIQ\"  Lab Results   Component Value Date/Time    CHOL 109 08/23/2021 01:31 AM    HDL 62 08/23/2021 01:31 AM    LDL 34 08/23/2021 01:31 AM     No results found for: \"GLUCPOC\"  [unfilled]    Notes reviewed from all clinical/nonclinical/nursing services involved in patient's clinical care. Care coordination discussions were held with appropriate clinical/nonclinical/ nursing providers based on care

## 2025-01-07 NOTE — PLAN OF CARE
Problem: Physical Therapy - Adult  Goal: By Discharge: Performs mobility at highest level of function for planned discharge setting.  See evaluation for individualized goals.  Description: FUNCTIONAL STATUS PRIOR TO ADMISSION: Patient was modified independent using a walking stick/cane for functional mobility.    HOME SUPPORT PRIOR TO ADMISSION: The patient lived with wife but did not require assistance.    Physical Therapy Goals  Initiated 1/7/2025  1.  Patient will move from supine to sit and sit to supine in bed with independence within 7 day(s).    2.  Patient will perform sit to stand with modified independence within 7 day(s).  3.  Patient will transfer from bed to chair and chair to bed with modified independence using the least restrictive device within 7 day(s).  4.  Patient will ambulate with modified independence for 150 feet with the least restrictive device within 7 day(s).   5.  Patient will ascend/descend 10 stairs with  handrail(s) with modified independence within 7 day(s).   1/7/2025 1443 by Marion Ferrer, PT  Outcome: Progressing

## 2025-01-07 NOTE — CONSULTS
01/07/25        I have been asked to see this patient by Lukas Kennedy MD  for advice/opinion re: -----                                                        Assessment:         SYL on CKD 3B  CKD 3B  Acute kidney injury grade 1  CHF with preserved ejection fraction  Influenza A  Mild chronic anemia Discussion:     Prior history of CKD stage IIIb chronically.  Recent worsening with acute hypoxic respiratory failure  Creatinine on admission 2.54-=> 2.27 today.    Baseline creatinine 1.7-9.9    Plan:   Agree with holding home ARB and diuretics.  Restart at discharge or when he is able to consume fluids at baseline  Agree with renally dosed ostomy aware  Daily renal panel                 Thanks for consulting me. Renal service will follow patient with you.Please don't hesitate to contact me if any questions arise of if I can assist in any manner.      Kami Valencia MD  Cell no- [phone]  Available on perfect serve.          Signed By: Kami Valencia MD     January 7, 2025           Consult Date: 1/7/2025    Inpatient consult to Nephrology  Consult performed by: Kami Valencia MD  Consult ordered by: Freddie Richmond MD            Subjective   HISTORY OF PRESENTING ILLNESS :  Vinicius Calles is a 89 y.o.,male ,White (non-) with past medical history of chronic heart failure with preserved ejection fraction, CKD stage IIIb, PVD s/p femoropopliteal bypass hypertension admitted to the hospital for influenza and hypoxic respiratory failure.  Creatinine noted to be elevated and renal was consulted.  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  He is voiding well.  He states that he is feeling better with Tamiflu    PMH:  Past Medical History:   Diagnosis Date    Arthritis     knee, ankle    CAD (coronary artery

## 2025-01-07 NOTE — CONSULTS
Consult noted, discussed with Dr. Richmond, no need for [facility] consult.

## 2025-01-07 NOTE — PLAN OF CARE
Problem: Chronic Conditions and Co-morbidities  Goal: Patient's chronic conditions and co-morbidity symptoms are monitored and maintained or improved  Outcome: Progressing  Flowsheets (Taken 1/7/2025 0917)  Care Plan - Patient's Chronic Conditions and Co-Morbidity Symptoms are Monitored and Maintained or Improved:   Monitor and assess patient's chronic conditions and comorbid symptoms for stability, deterioration, or improvement   Collaborate with multidisciplinary team to address chronic and comorbid conditions and prevent exacerbation or deterioration     Problem: Discharge Planning  Goal: Discharge to home or other facility with appropriate resources  Outcome: Progressing  Flowsheets (Taken 1/7/2025 0917)  Discharge to home or other facility with appropriate resources:   Identify barriers to discharge with patient and caregiver   Arrange for needed discharge resources and transportation as appropriate     Problem: Safety - Adult  Goal: Free from fall injury  Outcome: Progressing  Flowsheets (Taken 1/7/2025 1035)  Free From Fall Injury: Instruct family/caregiver on patient safety     Problem: ABCDS Injury Assessment  Goal: Absence of physical injury  Outcome: Progressing

## 2025-01-07 NOTE — PLAN OF CARE
Problem: Physical Therapy - Adult  Goal: By Discharge: Performs mobility at highest level of function for planned discharge setting.  See evaluation for individualized goals.  Description: FUNCTIONAL STATUS PRIOR TO ADMISSION: Patient was modified independent using a walking stick/cane for functional mobility.    HOME SUPPORT PRIOR TO ADMISSION: The patient lived with wife but did not require assistance.    Physical Therapy Goals  Initiated 1/7/2025  1.  Patient will move from supine to sit and sit to supine in bed with independence within 7 day(s).    2.  Patient will perform sit to stand with modified independence within 7 day(s).  3.  Patient will transfer from bed to chair and chair to bed with modified independence using the least restrictive device within 7 day(s).  4.  Patient will ambulate with modified independence for 150 feet with the least restrictive device within 7 day(s).   5.  Patient will ascend/descend 10 stairs with  handrail(s) with modified independence within 7 day(s).   Outcome: Progressing     PHYSICAL THERAPY EVALUATION    Patient: Vinicius Calles (89 y.o. male)  Date: 1/7/2025  Primary Diagnosis: Influenza A [J10.1]  Acute renal insufficiency [N28.9]  Acute respiratory failure with hypoxia [J96.01]  Acute congestive heart failure, unspecified heart failure type (HCC) [I50.9]       Precautions: Restrictions/Precautions: Fall Risk                      ASSESSMENT :   DEFICITS/IMPAIRMENTS:   The patient is limited by decreased functional mobility, strength, activity tolerance, endurance, safety awareness, cognition, balance.  Pt seen for PT evaluation after presenting to hospital with SOB and cough, found to be flu (+). Pt received OOB > chair, dressed in street clothes, agreeable to participate with PT. Pt reports he had a fall several months ago resulting in pelvic fx's, affecting gait mechanics. Pt is otherwise IND at baseline. Today, pt requires SBA-CGA for safety, holding walking sticks

## 2025-01-07 NOTE — DISCHARGE INSTRUCTIONS
To access the American Heart Association's Interactive Workbook \"Healthier Living with Heart Failure - Managing Symptoms and Reducing Risk\"  Scan the QR code below.                    Discharge Instructions       PATIENT ID: Vinicius Calles  MRN: [id number]   YOB: 1932    DATE OF ADMISSION: [unfilled]    DATE OF DISCHARGE: 1/7/2025    PRIMARY CARE PROVIDER: @PCP@     ATTENDING PHYSICIAN: [unfilled]  DISCHARGING PROVIDER: Lukas Kennedy MD    To contact this individual call [phone] and ask the  to page.   If unavailable ask to be transferred the Adult Hospitalist Department.    DISCHARGE DIAGNOSES Acute respiratory failure    CONSULTATIONS: cardiology    PROCEDURES/SURGERIES: * No surgery found *    PENDING TEST RESULTS:   At the time of discharge the following test results are still pending: none    FOLLOW UP APPOINTMENTS:   PCP  Cardiology    ADDITIONAL CARE RECOMMENDATIONS:   Please restart your Bumex from 1/8/2025    DIET: cardiac diet    ACTIVITY: activity as tolerated    DISCHARGE MEDICATIONS:   See Medication Reconciliation Form    It is important that you take the medication exactly as they are prescribed.   Keep your medication in the bottles provided by the pharmacist and keep a list of the medication names, dosages, and times to be taken in your wallet.   Do not take other medications without consulting your doctor.       NOTIFY YOUR PHYSICIAN FOR ANY OF THE FOLLOWING:   Fever over 101 degrees for 24 hours.   Chest pain, shortness of breath, fever, chills, nausea, vomiting, diarrhea, change in mentation, falling, weakness, bleeding. Severe pain or pain not relieved by medications.  Or, any other signs or symptoms that you may have questions about.      DISPOSITION:  x  Home With:   OT  PT  HH  RN       SNF/Inpatient Rehab/LTAC    Independent/assisted living    Hospice    Other:     CDMP Checked:   Yes x     PROBLEM LIST Updated:  Yes x       Signed:   Lukas Kennedy MD  1/7/2025  2:11

## 2025-01-07 NOTE — NURSE NAVIGATOR
HEART FAILURE NURSE NAVIGATOR NOTE  [facility]    Patient chart was reviewed by Heart Failure (HF) Nurse Navigators for compliance of prescribed treatment with guidelines directed medical therapy (GDMT) and HF database completed.     Please, review beneath recommendations for symptomatic patients with HF with Preserved Ejection Fraction >= 50% (HFpEF) for your consideration when taking care of this patient.    Current HF Medical Therapy:      Name Vinicius Calles    1932   LVEF 60-65% (echo dated 24)   NYHA Functional Class Not documented   ARNi/ACEi/ARB Candesartan 8mg qd-currently on hold d/t renal dysfunction   Aldosterone Antagonist    SGLT2 inhibitor    Consulting Cardiologist Dr Richmond ([facility])     Recommendations for HF Management:    For patients with HFpEF >= 50%, consider adding the following GDMT, if appropriate:  SGLT2 inhibitor [Class 2a]  ARNi or ARB [Class 2b]  Aldosterone antagonist [Class 2b]  Adjust antihypertensive therapy [Class 1]  Adjust diuretic dose at discharge if hospitalized for volume overload [Class 1]  For patient with hyperkalemia while on RAASi > 5.5, consider adding potassium binders (patiromer, sodium zirconium cycosilicate) [Class 2a]    Patients with suspected cardiac amyloidosis (older > 60 years old with LVH > 1.2cm and/or any other signs of amyloidosis) should be offered screening labs and imaging [Class 1]: (a) serum gammopathy profile and UPEP with immunofixation, and (b) PYP test. If PYP test is positive patient should have genetic testing done for inherited ATTR amyloidosis. If any findings are positive or you need genetic testing ordered, please, consider in-patient consultation or referral to [facility].      Note that the following medications may be potentially harmful in heart failure [Class 3].  Calcium channel blockers (doxazosin, diltiazem, verapamil, nifedipine)  Antiarrhythmics (flecanide,

## 2025-01-15 ENCOUNTER — OFFICE VISIT (OUTPATIENT)
Age: 89
End: 2025-01-15
Payer: MEDICARE

## 2025-01-15 VITALS
SYSTOLIC BLOOD PRESSURE: 110 MMHG | DIASTOLIC BLOOD PRESSURE: 60 MMHG | HEIGHT: 72 IN | WEIGHT: 148.2 LBS | RESPIRATION RATE: 16 BRPM | HEART RATE: 75 BPM | OXYGEN SATURATION: 98 % | BODY MASS INDEX: 20.07 KG/M2 | TEMPERATURE: 97.5 F

## 2025-01-15 DIAGNOSIS — D64.9 ANEMIA, UNSPECIFIED TYPE: ICD-10-CM

## 2025-01-15 DIAGNOSIS — I50.32 CHRONIC DIASTOLIC CONGESTIVE HEART FAILURE (HCC): ICD-10-CM

## 2025-01-15 DIAGNOSIS — R26.81 GAIT INSTABILITY: ICD-10-CM

## 2025-01-15 DIAGNOSIS — I44.30 AV BLOCK: Primary | ICD-10-CM

## 2025-01-15 DIAGNOSIS — N18.4 CHRONIC RENAL IMPAIRMENT, STAGE 4 (SEVERE) (HCC): ICD-10-CM

## 2025-01-15 DIAGNOSIS — R06.09 DYSPNEA ON EXERTION: ICD-10-CM

## 2025-01-15 DIAGNOSIS — I73.9 PAD (PERIPHERAL ARTERY DISEASE) (HCC): ICD-10-CM

## 2025-01-15 DIAGNOSIS — I36.1 NONRHEUMATIC TRICUSPID VALVE REGURGITATION: ICD-10-CM

## 2025-01-15 DIAGNOSIS — F41.9 ANXIETY: ICD-10-CM

## 2025-01-15 DIAGNOSIS — I25.10 CORONARY ARTERY DISEASE INVOLVING NATIVE CORONARY ARTERY OF NATIVE HEART WITHOUT ANGINA PECTORIS: ICD-10-CM

## 2025-01-15 DIAGNOSIS — I48.11 LONGSTANDING PERSISTENT ATRIAL FIBRILLATION (HCC): ICD-10-CM

## 2025-01-15 PROBLEM — S32.9XXS: Status: RESOLVED | Noted: 2024-08-07 | Resolved: 2025-01-15

## 2025-01-15 PROBLEM — T14.8XXA HEMATOMA: Status: RESOLVED | Noted: 2024-08-08 | Resolved: 2025-01-15

## 2025-01-15 PROBLEM — Z86.711 HISTORY OF PULMONARY EMBOLUS (PE): Status: ACTIVE | Noted: 2025-01-15

## 2025-01-15 PROBLEM — J10.1 INFLUENZA A: Status: RESOLVED | Noted: 2025-01-06 | Resolved: 2025-01-15

## 2025-01-15 PROBLEM — R06.02 SOB (SHORTNESS OF BREATH): Status: RESOLVED | Noted: 2021-08-22 | Resolved: 2025-01-15

## 2025-01-15 PROBLEM — S32.511A CLOSED FRACTURE OF RIGHT SUPERIOR RIM OF PUBIS (HCC): Status: RESOLVED | Noted: 2024-08-08 | Resolved: 2025-01-15

## 2025-01-15 PROBLEM — Z98.890 S/P COLONOSCOPIC POLYPECTOMY: Status: ACTIVE | Noted: 2025-01-15

## 2025-01-15 PROBLEM — Z98.890 S/P COLONOSCOPIC POLYPECTOMY: Chronic | Status: ACTIVE | Noted: 2025-01-15

## 2025-01-15 PROBLEM — K64.1 GRADE II HEMORRHOIDS: Status: RESOLVED | Noted: 2017-01-09 | Resolved: 2025-01-15

## 2025-01-15 PROCEDURE — 1123F ACP DISCUSS/DSCN MKR DOCD: CPT | Performed by: INTERNAL MEDICINE

## 2025-01-15 PROCEDURE — 1126F AMNT PAIN NOTED NONE PRSNT: CPT | Performed by: INTERNAL MEDICINE

## 2025-01-15 PROCEDURE — 99215 OFFICE O/P EST HI 40 MIN: CPT | Performed by: INTERNAL MEDICINE

## 2025-01-15 PROCEDURE — 1159F MED LIST DOCD IN RCRD: CPT | Performed by: INTERNAL MEDICINE

## 2025-01-15 RX ORDER — DULOXETIN HYDROCHLORIDE 30 MG/1
30 CAPSULE, DELAYED RELEASE ORAL DAILY
Qty: 30 CAPSULE | Refills: 1 | Status: SHIPPED | OUTPATIENT
Start: 2025-01-15

## 2025-01-15 SDOH — ECONOMIC STABILITY: FOOD INSECURITY: WITHIN THE PAST 12 MONTHS, THE FOOD YOU BOUGHT JUST DIDN'T LAST AND YOU DIDN'T HAVE MONEY TO GET MORE.: NEVER TRUE

## 2025-01-15 SDOH — ECONOMIC STABILITY: FOOD INSECURITY: WITHIN THE PAST 12 MONTHS, YOU WORRIED THAT YOUR FOOD WOULD RUN OUT BEFORE YOU GOT MONEY TO BUY MORE.: NEVER TRUE

## 2025-01-15 ASSESSMENT — PATIENT HEALTH QUESTIONNAIRE - PHQ9
1. LITTLE INTEREST OR PLEASURE IN DOING THINGS: NOT AT ALL
SUM OF ALL RESPONSES TO PHQ QUESTIONS 1-9: 0
2. FEELING DOWN, DEPRESSED OR HOPELESS: NOT AT ALL
SUM OF ALL RESPONSES TO PHQ9 QUESTIONS 1 & 2: 0
SUM OF ALL RESPONSES TO PHQ QUESTIONS 1-9: 0

## 2025-01-15 NOTE — PROGRESS NOTES
Chief Complaint   Patient presents with    New Patient     eviewed record in preparation for visit and have obtained necessary documentation.    Identified pt with two pt identifiers(name and ).      Health Maintenance Due   Topic    Depression Screen     Shingles vaccine (2 of 3)    COVID-19 Vaccine ( season)    Annual Wellness Visit (Medicare)          Chief Complaint   Patient presents with    New Patient        Wt Readings from Last 3 Encounters:   01/15/25 67.2 kg (148 lb 3.2 oz)   25 68.3 kg (150 lb 9.2 oz)   24 72.1 kg (159 lb)     Temp Readings from Last 3 Encounters:   01/15/25 97.5 °F (36.4 °C) (Temporal)   25 97.3 °F (36.3 °C) (Oral)   24 97.9 °F (36.6 °C) (Oral)     BP Readings from Last 3 Encounters:   01/15/25 110/60   25 (!) 103/58   24 102/61     Pulse Readings from Last 3 Encounters:   01/15/25 75   25 77   24 70           Learning Assessment:  :    Failed to redirect to the Timeline version of the REVFS SmartLink.    Depression Screening:  :         No data to display                Fall Risk Assessment:  :    Failed to redirect to the Timeline version of the REVFS SmartLink.    Abuse Screening:  :         No data to display

## 2025-01-15 NOTE — PROGRESS NOTES
Assessment/Plan:     1. AV block  -has pacemaker.  -has transferred his cardiac care to Dr. Richmond at [facility].    2. Nonrheumatic tricuspid valve regurgitation  -s/p clip placement in 11/2024 at [facility].  -last Echo showing mild regurgitation with his tricuspid valve    3. Longstanding persistent atrial fibrillation (HCC)  -patient reports that Dr. Richmond discussed cardioversion with amiodarone.  He is considering.    -has follow up with Dr. Richmond in 2 weeks.    4. Coronary artery disease involving native coronary artery of native heart without angina pectoris  -managed with cardiology    5. PAD (peripheral artery disease) ([facility])  -s/p cardiac catherization January, 2024 at [facility]    6. Chronic diastolic congestive heart failure (HCC)  -currently taking bumex 2mg bid  -following with cardiology    7. Chronic renal impairment, stage 4 (severe) ([facility])  -in review of his records, he has been slowly declining over the last year.  -recommended consultation with nephrology.  -on chronic diuretic   - AFL - Navjot Carney MD, Neph[facility] [place] ([facility])    8. Anxiety  -has been using hydroxyzine prn 25mg tid for anxiety.  Discussed my concerns for use of this medication as it may lower his blood pressure and increase risk of lightheadedness and falls.   -has chronic pain in his right leg secondary to recent fall and pelvic fracture.  -recommended use of duloxetine 30mg daily to help with anxiety and daily pain.   -will follow up in 1 month for reevaluation of dosing    9. Anemia, unspecified type  -due to chronic disease.  Has been stable with hgb between 10-11    10. Gait instability  -using 2 canes for ambulation.  No recent falls.   -recommended PT for gait stability.  Script given.     - External Referral To Physical Therapy     11. Dyspnea on exertion  -multifactorial - has chronic atrial fibrillation, chronic diastolic dysfunction, chronic pain with right leg from recent pelvic fracture and chronic anemia, and

## 2025-01-16 ENCOUNTER — TELEPHONE (OUTPATIENT)
Age: 89
End: 2025-01-16

## 2025-01-16 NOTE — TELEPHONE ENCOUNTER
Patient established care with Dr. Glass yesterday.  He would like to speak to a nurse regarding his AVS and medications.    Vinicius Calles - [phone]

## 2025-01-20 ENCOUNTER — TRANSCRIBE ORDERS (OUTPATIENT)
Facility: HOSPITAL | Age: 89
End: 2025-01-20

## 2025-01-20 DIAGNOSIS — Z95.4 S/P AORTIC VALVE ALLOGRAFT: Primary | ICD-10-CM

## 2025-01-22 ENCOUNTER — TELEPHONE (OUTPATIENT)
Age: 89
End: 2025-01-22

## 2025-01-22 RX ORDER — CITALOPRAM HYDROBROMIDE 10 MG/1
10 TABLET ORAL DAILY
Qty: 30 TABLET | Refills: 0 | Status: SHIPPED | OUTPATIENT
Start: 2025-01-22

## 2025-01-22 NOTE — TELEPHONE ENCOUNTER
1/22/2025 spoke with patient.     -duloxetine started on 1/15/2025 for arthritic pain and depression.  He reports it is causing loose stools.    -will stop duloxetine.  He was instructed to replace this medication with citalopram 10mg daily.  He took the duloxetine around midday.    He has an established follow up on 2/17/2025.

## 2025-01-22 NOTE — TELEPHONE ENCOUNTER
Patient called to let Dr. Glass know he cannot tolerate Duloxetine -- is giving him diarrhea.  Is there something else he can take?

## 2025-01-24 ENCOUNTER — TRANSCRIBE ORDERS (OUTPATIENT)
Facility: HOSPITAL | Age: 89
End: 2025-01-24

## 2025-01-24 DIAGNOSIS — Z98.890 PERSONAL HISTORY OF SURGERY TO HEART AND GREAT VESSELS, PRESENTING HAZARDS TO HEALTH: Primary | ICD-10-CM

## 2025-01-28 ENCOUNTER — HOSPITAL ENCOUNTER (OUTPATIENT)
Facility: HOSPITAL | Age: 89
Setting detail: RECURRING SERIES
Discharge: HOME | End: 2025-01-31
Attending: INTERNAL MEDICINE
Payer: MEDICARE

## 2025-01-28 VITALS
RESPIRATION RATE: 18 BRPM | OXYGEN SATURATION: 98 % | BODY MASS INDEX: 21.77 KG/M2 | WEIGHT: 147 LBS | HEART RATE: 70 BPM | HEIGHT: 69 IN

## 2025-01-28 PROCEDURE — 93797 PHYS/QHP OP CAR RHAB WO ECG: CPT

## 2025-01-28 PROCEDURE — 93798 PHYS/QHP OP CAR RHAB W/ECG: CPT

## 2025-01-28 RX ORDER — CANDESARTAN 16 MG/1
8 TABLET ORAL PRN
COMMUNITY

## 2025-01-28 RX ORDER — SIMVASTATIN 20 MG
20 TABLET ORAL NIGHTLY
COMMUNITY

## 2025-01-28 ASSESSMENT — PATIENT HEALTH QUESTIONNAIRE - PHQ9
7. TROUBLE CONCENTRATING ON THINGS, SUCH AS READING THE NEWSPAPER OR WATCHING TELEVISION: NOT AT ALL
8. MOVING OR SPEAKING SO SLOWLY THAT OTHER PEOPLE COULD HAVE NOTICED. OR THE OPPOSITE, BEING SO FIGETY OR RESTLESS THAT YOU HAVE BEEN MOVING AROUND A LOT MORE THAN USUAL: NOT AT ALL
10. IF YOU CHECKED OFF ANY PROBLEMS, HOW DIFFICULT HAVE THESE PROBLEMS MADE IT FOR YOU TO DO YOUR WORK, TAKE CARE OF THINGS AT HOME, OR GET ALONG WITH OTHER PEOPLE: NOT DIFFICULT AT ALL
1. LITTLE INTEREST OR PLEASURE IN DOING THINGS: NOT AT ALL
9. THOUGHTS THAT YOU WOULD BE BETTER OFF DEAD, OR OF HURTING YOURSELF: NOT AT ALL
SUM OF ALL RESPONSES TO PHQ QUESTIONS 1-9: 1
5. POOR APPETITE OR OVEREATING: NOT AT ALL
SUM OF ALL RESPONSES TO PHQ QUESTIONS 1-9: 1
SUM OF ALL RESPONSES TO PHQ9 QUESTIONS 1 & 2: 0
4. FEELING TIRED OR HAVING LITTLE ENERGY: SEVERAL DAYS
SUM OF ALL RESPONSES TO PHQ QUESTIONS 1-9: 1
6. FEELING BAD ABOUT YOURSELF - OR THAT YOU ARE A FAILURE OR HAVE LET YOURSELF OR YOUR FAMILY DOWN: NOT AT ALL
3. TROUBLE FALLING OR STAYING ASLEEP: NOT AT ALL
2. FEELING DOWN, DEPRESSED OR HOPELESS: NOT AT ALL
SUM OF ALL RESPONSES TO PHQ QUESTIONS 1-9: 1

## 2025-01-28 ASSESSMENT — EXERCISE STRESS TEST
PEAK_RPE: 12
PEAK_METS: 1.6
PEAK_HR: 85
PEAK_BP: 144/58

## 2025-01-28 NOTE — CARDIO/PULMONARY
INTAKE APPOINTMENT NOTE  2025    NAME: Vinicius Calles : 1932 AGE: 89 y.o.  GENDER: male    CARDIAC REHAB ADMITTING DIAGNOSIS: Personal history of surgery to heart and great vessels, presenting hazards to health [Z98.890]    REFERRING PHYSICIAN: Larry Reed MD    MEDICAL HX:  Past Medical History:   Diagnosis Date    Arthritis     knee, ankle    CAD (coronary artery disease)     CHF (congestive heart failure) (HCC)     Chronic kidney disease     Chronic obstructive pulmonary disease (HCC)     denies    GERD (gastroesophageal reflux disease)     Heart failure (HCC)     History of pelvic fracture     Hypertension     Ill-defined condition     glaucoma       LABS:     No results found for: \"HBA1C\", \"[id number]\"  Lab Results   Component Value Date/Time    CHOL 109 2021 01:31 AM    HDL 62 2021 01:31 AM    LDL 34 2021 01:31 AM    VLDL 13 2021 01:31 AM         ANTHROPOMETRICS:      Ht Readings from Last 1 Encounters:   25 1.753 m (5' 9\")      Wt Readings from Last 1 Encounters:   25 66.7 kg (147 lb)        WAIST: 36       VISIT SUMMARY:     Vinicius EFRAIN Calles 89 y.o. presented to Cardiac Rehab for program orientation and 6 minute ETT today with a primary diagnosis of Personal history of surgery to heart and great vessels, presenting hazards to health [Z98.890]. EF is 60 % Cardiac risk factors include family history, dyslipidemia, hypertension, stress, valvular heart disease.  Patient is a never smoker.     Vinicius Calles lives with his wife.  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  He will be enrolling at [facility] for hip PT and understands that cardiac rehab and [facility] cannot be covered by insurance for same-day visits.  Patient was evaluated for depression using the PHQ-9 assessment tool with a result of 1 which is considered mild. The result was discussed with patient.  He is excited about the opportunity to advance his

## 2025-02-03 ENCOUNTER — HOSPITAL ENCOUNTER (OUTPATIENT)
Facility: HOSPITAL | Age: 89
Setting detail: RECURRING SERIES
Discharge: HOME | End: 2025-02-06
Attending: INTERNAL MEDICINE
Payer: MEDICARE

## 2025-02-03 VITALS — BODY MASS INDEX: 21.62 KG/M2 | WEIGHT: 146.4 LBS

## 2025-02-03 PROCEDURE — 93798 PHYS/QHP OP CAR RHAB W/ECG: CPT

## 2025-02-03 ASSESSMENT — EXERCISE STRESS TEST
PEAK_METS: 1.6
PEAK_RPE: 12

## 2025-02-10 ENCOUNTER — HOSPITAL ENCOUNTER (OUTPATIENT)
Facility: HOSPITAL | Age: 89
Setting detail: RECURRING SERIES
Discharge: HOME | End: 2025-02-13
Attending: INTERNAL MEDICINE
Payer: MEDICARE

## 2025-02-10 VITALS — BODY MASS INDEX: 22.59 KG/M2 | WEIGHT: 153 LBS

## 2025-02-10 PROCEDURE — 93798 PHYS/QHP OP CAR RHAB W/ECG: CPT

## 2025-02-10 ASSESSMENT — EXERCISE STRESS TEST
PEAK_METS: 1.6
PEAK_RPE: 12

## 2025-02-14 ENCOUNTER — HOSPITAL ENCOUNTER (OUTPATIENT)
Facility: HOSPITAL | Age: 89
Setting detail: RECURRING SERIES
Discharge: HOME | End: 2025-02-17
Attending: INTERNAL MEDICINE
Payer: MEDICARE

## 2025-02-14 VITALS — WEIGHT: 152 LBS | BODY MASS INDEX: 22.45 KG/M2

## 2025-02-14 PROCEDURE — 93798 PHYS/QHP OP CAR RHAB W/ECG: CPT

## 2025-02-14 ASSESSMENT — EXERCISE STRESS TEST
PEAK_RPE: 12
PEAK_METS: 1.6

## 2025-02-17 ENCOUNTER — OFFICE VISIT (OUTPATIENT)
Age: 89
End: 2025-02-17
Payer: MEDICARE

## 2025-02-17 ENCOUNTER — HOSPITAL ENCOUNTER (OUTPATIENT)
Facility: HOSPITAL | Age: 89
Setting detail: RECURRING SERIES
Discharge: HOME | End: 2025-02-20
Attending: INTERNAL MEDICINE
Payer: MEDICARE

## 2025-02-17 VITALS — BODY MASS INDEX: 21.94 KG/M2 | WEIGHT: 148.6 LBS

## 2025-02-17 VITALS
HEIGHT: 69 IN | TEMPERATURE: 98.1 F | HEART RATE: 88 BPM | BODY MASS INDEX: 22.22 KG/M2 | RESPIRATION RATE: 16 BRPM | DIASTOLIC BLOOD PRESSURE: 80 MMHG | SYSTOLIC BLOOD PRESSURE: 130 MMHG | OXYGEN SATURATION: 96 % | WEIGHT: 150 LBS

## 2025-02-17 DIAGNOSIS — R60.0 PEDAL EDEMA: ICD-10-CM

## 2025-02-17 DIAGNOSIS — D64.9 ANEMIA, UNSPECIFIED TYPE: ICD-10-CM

## 2025-02-17 DIAGNOSIS — F41.8 DEPRESSION WITH ANXIETY: Primary | ICD-10-CM

## 2025-02-17 DIAGNOSIS — N18.4 CHRONIC RENAL IMPAIRMENT, STAGE 4 (SEVERE) (HCC): ICD-10-CM

## 2025-02-17 DIAGNOSIS — Z79.899 ENCOUNTER FOR LONG-TERM (CURRENT) USE OF MEDICATIONS: ICD-10-CM

## 2025-02-17 PROCEDURE — 1159F MED LIST DOCD IN RCRD: CPT | Performed by: INTERNAL MEDICINE

## 2025-02-17 PROCEDURE — 99214 OFFICE O/P EST MOD 30 MIN: CPT | Performed by: INTERNAL MEDICINE

## 2025-02-17 PROCEDURE — 93798 PHYS/QHP OP CAR RHAB W/ECG: CPT

## 2025-02-17 PROCEDURE — 1126F AMNT PAIN NOTED NONE PRSNT: CPT | Performed by: INTERNAL MEDICINE

## 2025-02-17 PROCEDURE — 1160F RVW MEDS BY RX/DR IN RCRD: CPT | Performed by: INTERNAL MEDICINE

## 2025-02-17 PROCEDURE — 1123F ACP DISCUSS/DSCN MKR DOCD: CPT | Performed by: INTERNAL MEDICINE

## 2025-02-17 RX ORDER — CITALOPRAM HYDROBROMIDE 10 MG/1
10 TABLET ORAL DAILY
Qty: 90 TABLET | Refills: 0 | Status: ON HOLD | OUTPATIENT
Start: 2025-02-17 | End: 2025-02-21

## 2025-02-17 ASSESSMENT — EXERCISE STRESS TEST
PEAK_RPE: 12
PEAK_METS: 1.6

## 2025-02-17 NOTE — PROGRESS NOTES
Chief Complaint   Patient presents with    Follow-up     eviewed record in preparation for visit and have obtained necessary documentation.    Identified pt with two pt identifiers(name and ).      Health Maintenance Due   Topic    Shingles vaccine (2 of 3)    DTaP/Tdap/Td vaccine (1 - Tdap)    Lipids     COVID-19 Vaccine ( season)    Annual Wellness Visit (Medicare)          Chief Complaint   Patient presents with    Follow-up        Wt Readings from Last 3 Encounters:   25 68 kg (150 lb)   25 68.9 kg (152 lb)   02/10/25 69.4 kg (153 lb)     Temp Readings from Last 3 Encounters:   25 98.1 °F (36.7 °C) (Temporal)   01/15/25 97.5 °F (36.4 °C) (Temporal)   25 97.3 °F (36.3 °C) (Oral)     BP Readings from Last 3 Encounters:   25 130/80   01/15/25 110/60   25 (!) 103/58     Pulse Readings from Last 3 Encounters:   25 88   25 70   01/15/25 75           Learning Assessment:  :    Failed to redirect to the Timeline version of the REVFS SmartLink.    Depression Screening:  :         No data to display                Fall Risk Assessment:  :    Failed to redirect to the Timeline version of the REVFS SmartLink.    Abuse Screening:  :         No data to display

## 2025-02-17 NOTE — PROGRESS NOTES
Vinicius Calles is a 89 y.o. male Established patient who presents today for evaluation of the following -           Assessment & Plan  1. Depression:  -started duloxetine on 1/15/2025, but stopped on 1/22/2025 because duloxetine cause diarrhea. Switched to citalopram, but he did not start it.  Still using hydroxyzine for anxiety which was given to him by his prior PCP.  Discussed use citalopram daily to help him with control of his anxiety and depression due to his chronic medical illnesses.  He agrees to start.   - Start citalopram 20 mg at bedtime  - Prescription at pharmacy  -follow up 1 month for reevaluation    2. Bilateral lower extremity edema:  - has stage 4 kidney failure and chronic CHF.  Taking bumex once to twice daily, but has been recommended by both his cardiologist and nephrologist to reduce use of his bumex to once daily and use second dose if noted pedal edema.    - encouraged him to wear compression stockings daily to help him reduce use of bumex       3. Stage 4 kidney failure:  - eGFR 23-25  - Continue monitoring  - Follow-up with nephrologist next month    4. Anemia:  - Likely secondary to cardiac and renal conditions  - last hemoglobin done 1/7/2025 -hgb 10.9.  his baseline is 10.9-11.6 in     5. Shortness of breath:  - Attributed to tricuspid valve issue, heart failure, and atrial fibrillation  - Reassured on current medications  - Continue compression stockings and cardiac rehab           Orders Placed This Encounter    citalopram (CELEXA) 10 MG tablet     Sig: Take 1 tablet by mouth daily     Dispense:  90 tablet     Refill:  0       Follow-up Disposition:     Return in about 1 month (around 3/17/2025).          On this 2/17/2025  I have spent 35 minutes reviewing previous notes, test results and face to face with the patient discussing the diagnosis and importance of compliance with the treatment plan as well as documenting on the day of the visit.             History of Present

## 2025-02-20 ENCOUNTER — HOSPITAL ENCOUNTER (INPATIENT)
Facility: HOSPITAL | Age: 89
LOS: 1 days | Discharge: HOME | DRG: 291 | End: 2025-02-21
Attending: EMERGENCY MEDICINE | Admitting: FAMILY MEDICINE
Payer: MEDICARE

## 2025-02-20 ENCOUNTER — APPOINTMENT (OUTPATIENT)
Facility: HOSPITAL | Age: 89
DRG: 291 | End: 2025-02-20
Payer: MEDICARE

## 2025-02-20 ENCOUNTER — TELEPHONE (OUTPATIENT)
Age: 89
End: 2025-02-20

## 2025-02-20 DIAGNOSIS — N18.9 CHRONIC KIDNEY DISEASE, UNSPECIFIED CKD STAGE: ICD-10-CM

## 2025-02-20 DIAGNOSIS — I50.9 ACUTE ON CHRONIC CONGESTIVE HEART FAILURE, UNSPECIFIED HEART FAILURE TYPE (HCC): Primary | ICD-10-CM

## 2025-02-20 LAB
ALBUMIN SERPL-MCNC: 3.4 G/DL (ref 3.5–5)
ALBUMIN/GLOB SERPL: 0.7 (ref 1.1–2.2)
ALP SERPL-CCNC: 127 U/L (ref 45–117)
ALT SERPL-CCNC: 19 U/L (ref 12–78)
ANION GAP SERPL CALC-SCNC: 7 MMOL/L (ref 2–12)
AST SERPL-CCNC: 22 U/L (ref 15–37)
BASOPHILS # BLD: 0.12 K/UL (ref 0–0.1)
BASOPHILS NFR BLD: 1.3 % (ref 0–1)
BILIRUB SERPL-MCNC: 0.9 MG/DL (ref 0.2–1)
BUN SERPL-MCNC: 64 MG/DL (ref 6–20)
BUN/CREAT SERPL: 29 (ref 12–20)
CALCIUM SERPL-MCNC: 9.1 MG/DL (ref 8.5–10.1)
CHLORIDE SERPL-SCNC: 100 MMOL/L (ref 97–108)
CO2 SERPL-SCNC: 29 MMOL/L (ref 21–32)
COMMENT:: NORMAL
CREAT SERPL-MCNC: 2.17 MG/DL (ref 0.7–1.3)
DIFFERENTIAL METHOD BLD: ABNORMAL
EKG ATRIAL RATE: 234 BPM
EKG DIAGNOSIS: NORMAL
EKG Q-T INTERVAL: 500 MS
EKG QRS DURATION: 174 MS
EKG QTC CALCULATION (BAZETT): 558 MS
EKG R AXIS: -82 DEGREES
EKG T AXIS: 89 DEGREES
EKG VENTRICULAR RATE: 75 BPM
EOSINOPHIL # BLD: 0.27 K/UL (ref 0–0.4)
EOSINOPHIL NFR BLD: 3 % (ref 0–7)
ERYTHROCYTE [DISTWIDTH] IN BLOOD BY AUTOMATED COUNT: 16.7 % (ref 11.5–14.5)
GLOBULIN SER CALC-MCNC: 5.1 G/DL (ref 2–4)
GLUCOSE SERPL-MCNC: 98 MG/DL (ref 65–100)
HCT VFR BLD AUTO: 35 % (ref 36.6–50.3)
HGB BLD-MCNC: 11.2 G/DL (ref 12.1–17)
IMM GRANULOCYTES # BLD AUTO: 0.06 K/UL (ref 0–0.04)
IMM GRANULOCYTES NFR BLD AUTO: 0.7 % (ref 0–0.5)
LYMPHOCYTES # BLD: 2.52 K/UL (ref 0.8–3.5)
LYMPHOCYTES NFR BLD: 28.1 % (ref 12–49)
MCH RBC QN AUTO: 32.5 PG (ref 26–34)
MCHC RBC AUTO-ENTMCNC: 32 G/DL (ref 30–36.5)
MCV RBC AUTO: 101.4 FL (ref 80–99)
MONOCYTES # BLD: 1.3 K/UL (ref 0–1)
MONOCYTES NFR BLD: 14.5 % (ref 5–13)
NEUTS SEG # BLD: 4.7 K/UL (ref 1.8–8)
NEUTS SEG NFR BLD: 52.4 % (ref 32–75)
NRBC # BLD: 0.05 K/UL (ref 0–0.01)
NRBC BLD-RTO: 0.6 PER 100 WBC
NT PRO BNP: ABNORMAL PG/ML
PLATELET # BLD AUTO: 213 K/UL (ref 150–400)
PMV BLD AUTO: 11.5 FL (ref 8.9–12.9)
POTASSIUM SERPL-SCNC: 3.8 MMOL/L (ref 3.5–5.1)
PROT SERPL-MCNC: 8.5 G/DL (ref 6.4–8.2)
RBC # BLD AUTO: 3.45 M/UL (ref 4.1–5.7)
SODIUM SERPL-SCNC: 136 MMOL/L (ref 136–145)
SPECIMEN HOLD: NORMAL
TROPONIN I SERPL HS-MCNC: 42 NG/L (ref 0–76)
WBC # BLD AUTO: 9 K/UL (ref 4.1–11.1)

## 2025-02-20 PROCEDURE — 83880 ASSAY OF NATRIURETIC PEPTIDE: CPT

## 2025-02-20 PROCEDURE — 36415 COLL VENOUS BLD VENIPUNCTURE: CPT

## 2025-02-20 PROCEDURE — G0378 HOSPITAL OBSERVATION PER HR: HCPCS

## 2025-02-20 PROCEDURE — 2060000000 HC ICU INTERMEDIATE R&B

## 2025-02-20 PROCEDURE — 94640 AIRWAY INHALATION TREATMENT: CPT

## 2025-02-20 PROCEDURE — 6360000002 HC RX W HCPCS: Performed by: FAMILY MEDICINE

## 2025-02-20 PROCEDURE — 93010 ELECTROCARDIOGRAM REPORT: CPT | Performed by: INTERNAL MEDICINE

## 2025-02-20 PROCEDURE — 71046 X-RAY EXAM CHEST 2 VIEWS: CPT

## 2025-02-20 PROCEDURE — 80053 COMPREHEN METABOLIC PANEL: CPT

## 2025-02-20 PROCEDURE — 6370000000 HC RX 637 (ALT 250 FOR IP): Performed by: FAMILY MEDICINE

## 2025-02-20 PROCEDURE — 96365 THER/PROPH/DIAG IV INF INIT: CPT

## 2025-02-20 PROCEDURE — P9047 ALBUMIN (HUMAN), 25%, 50ML: HCPCS | Performed by: NURSE PRACTITIONER

## 2025-02-20 PROCEDURE — 84484 ASSAY OF TROPONIN QUANT: CPT

## 2025-02-20 PROCEDURE — 93005 ELECTROCARDIOGRAM TRACING: CPT

## 2025-02-20 PROCEDURE — 99285 EMERGENCY DEPT VISIT HI MDM: CPT

## 2025-02-20 PROCEDURE — 6360000002 HC RX W HCPCS: Performed by: EMERGENCY MEDICINE

## 2025-02-20 PROCEDURE — 6360000002 HC RX W HCPCS: Performed by: NURSE PRACTITIONER

## 2025-02-20 PROCEDURE — 2500000003 HC RX 250 WO HCPCS: Performed by: FAMILY MEDICINE

## 2025-02-20 PROCEDURE — 85025 COMPLETE CBC W/AUTO DIFF WBC: CPT

## 2025-02-20 PROCEDURE — 96375 TX/PRO/DX INJ NEW DRUG ADDON: CPT

## 2025-02-20 RX ORDER — FINASTERIDE 5 MG/1
5 TABLET, FILM COATED ORAL DAILY
Status: DISCONTINUED | OUTPATIENT
Start: 2025-02-20 | End: 2025-02-21 | Stop reason: HOSPADM

## 2025-02-20 RX ORDER — ACETAMINOPHEN 650 MG/1
650 SUPPOSITORY RECTAL EVERY 6 HOURS PRN
Status: DISCONTINUED | OUTPATIENT
Start: 2025-02-20 | End: 2025-02-21 | Stop reason: HOSPADM

## 2025-02-20 RX ORDER — METOPROLOL SUCCINATE 25 MG/1
25 TABLET, EXTENDED RELEASE ORAL DAILY
Status: DISCONTINUED | OUTPATIENT
Start: 2025-02-20 | End: 2025-02-21 | Stop reason: HOSPADM

## 2025-02-20 RX ORDER — CITALOPRAM HYDROBROMIDE 20 MG/1
10 TABLET ORAL DAILY
Status: DISCONTINUED | OUTPATIENT
Start: 2025-02-20 | End: 2025-02-21 | Stop reason: HOSPADM

## 2025-02-20 RX ORDER — TIMOLOL MALEATE 5 MG/ML
1 SOLUTION/ DROPS OPHTHALMIC 2 TIMES DAILY
Status: DISCONTINUED | OUTPATIENT
Start: 2025-02-20 | End: 2025-02-21 | Stop reason: HOSPADM

## 2025-02-20 RX ORDER — ONDANSETRON 4 MG/1
4 TABLET, ORALLY DISINTEGRATING ORAL EVERY 8 HOURS PRN
Status: DISCONTINUED | OUTPATIENT
Start: 2025-02-20 | End: 2025-02-21 | Stop reason: HOSPADM

## 2025-02-20 RX ORDER — ALBUMIN (HUMAN) 12.5 G/50ML
25 SOLUTION INTRAVENOUS ONCE
Status: COMPLETED | OUTPATIENT
Start: 2025-02-20 | End: 2025-02-21

## 2025-02-20 RX ORDER — DORZOLAMIDE HYDROCHLORIDE AND TIMOLOL MALEATE 20; 5 MG/ML; MG/ML
1 SOLUTION/ DROPS OPHTHALMIC 2 TIMES DAILY
Status: DISCONTINUED | OUTPATIENT
Start: 2025-02-20 | End: 2025-02-20 | Stop reason: CLARIF

## 2025-02-20 RX ORDER — EZETIMIBE 10 MG/1
10 TABLET ORAL DAILY
Status: DISCONTINUED | OUTPATIENT
Start: 2025-02-20 | End: 2025-02-21 | Stop reason: HOSPADM

## 2025-02-20 RX ORDER — TRAZODONE HYDROCHLORIDE 50 MG/1
25 TABLET ORAL NIGHTLY
Status: DISCONTINUED | OUTPATIENT
Start: 2025-02-20 | End: 2025-02-21 | Stop reason: HOSPADM

## 2025-02-20 RX ORDER — SODIUM CHLORIDE 9 MG/ML
INJECTION, SOLUTION INTRAVENOUS PRN
Status: DISCONTINUED | OUTPATIENT
Start: 2025-02-20 | End: 2025-02-21 | Stop reason: HOSPADM

## 2025-02-20 RX ORDER — SODIUM CHLORIDE 0.9 % (FLUSH) 0.9 %
5-40 SYRINGE (ML) INJECTION PRN
Status: DISCONTINUED | OUTPATIENT
Start: 2025-02-20 | End: 2025-02-21 | Stop reason: HOSPADM

## 2025-02-20 RX ORDER — POLYETHYLENE GLYCOL 3350 17 G/17G
17 POWDER, FOR SOLUTION ORAL DAILY PRN
Status: DISCONTINUED | OUTPATIENT
Start: 2025-02-20 | End: 2025-02-21 | Stop reason: HOSPADM

## 2025-02-20 RX ORDER — DORZOLAMIDE HCL 20 MG/ML
1 SOLUTION/ DROPS OPHTHALMIC 2 TIMES DAILY
Status: DISCONTINUED | OUTPATIENT
Start: 2025-02-20 | End: 2025-02-21 | Stop reason: HOSPADM

## 2025-02-20 RX ORDER — ACETAMINOPHEN 325 MG/1
650 TABLET ORAL EVERY 6 HOURS PRN
Status: DISCONTINUED | OUTPATIENT
Start: 2025-02-20 | End: 2025-02-21 | Stop reason: HOSPADM

## 2025-02-20 RX ORDER — ONDANSETRON 2 MG/ML
4 INJECTION INTRAMUSCULAR; INTRAVENOUS EVERY 6 HOURS PRN
Status: DISCONTINUED | OUTPATIENT
Start: 2025-02-20 | End: 2025-02-21 | Stop reason: HOSPADM

## 2025-02-20 RX ORDER — BUMETANIDE 0.25 MG/ML
4 INJECTION, SOLUTION INTRAMUSCULAR; INTRAVENOUS ONCE
Status: COMPLETED | OUTPATIENT
Start: 2025-02-20 | End: 2025-02-20

## 2025-02-20 RX ORDER — ATORVASTATIN CALCIUM 10 MG/1
10 TABLET, FILM COATED ORAL DAILY
Status: DISCONTINUED | OUTPATIENT
Start: 2025-02-20 | End: 2025-02-21 | Stop reason: HOSPADM

## 2025-02-20 RX ORDER — BUMETANIDE 0.25 MG/ML
4 INJECTION, SOLUTION INTRAMUSCULAR; INTRAVENOUS 2 TIMES DAILY
Status: DISCONTINUED | OUTPATIENT
Start: 2025-02-21 | End: 2025-02-21

## 2025-02-20 RX ORDER — SODIUM CHLORIDE 0.9 % (FLUSH) 0.9 %
5-40 SYRINGE (ML) INJECTION EVERY 12 HOURS SCHEDULED
Status: DISCONTINUED | OUTPATIENT
Start: 2025-02-20 | End: 2025-02-21 | Stop reason: HOSPADM

## 2025-02-20 RX ADMIN — TIMOLOL MALEATE 1 DROP: 5 SOLUTION/ DROPS OPHTHALMIC at 23:03

## 2025-02-20 RX ADMIN — CITALOPRAM HYDROBROMIDE 10 MG: 20 TABLET ORAL at 22:01

## 2025-02-20 RX ADMIN — ARFORMOTEROL TARTRATE: 15 SOLUTION RESPIRATORY (INHALATION) at 22:05

## 2025-02-20 RX ADMIN — DORZOLAMIDE HYDROCHLORIDE 1 DROP: 20 SOLUTION/ DROPS OPHTHALMIC at 23:03

## 2025-02-20 RX ADMIN — ATORVASTATIN CALCIUM 10 MG: 20 TABLET, FILM COATED ORAL at 22:01

## 2025-02-20 RX ADMIN — BUMETANIDE 4 MG: 0.25 INJECTION INTRAMUSCULAR; INTRAVENOUS at 18:56

## 2025-02-20 RX ADMIN — TRAZODONE HYDROCHLORIDE 25 MG: 50 TABLET ORAL at 22:03

## 2025-02-20 RX ADMIN — APIXABAN 2.5 MG: 5 TABLET, FILM COATED ORAL at 22:03

## 2025-02-20 RX ADMIN — EZETIMIBE 10 MG: 10 TABLET ORAL at 22:04

## 2025-02-20 RX ADMIN — METOPROLOL SUCCINATE 25 MG: 25 TABLET, EXTENDED RELEASE ORAL at 22:04

## 2025-02-20 RX ADMIN — FINASTERIDE 5 MG: 5 TABLET, FILM COATED ORAL at 23:03

## 2025-02-20 RX ADMIN — SODIUM CHLORIDE, PRESERVATIVE FREE 10 ML: 5 INJECTION INTRAVENOUS at 23:03

## 2025-02-20 RX ADMIN — ALBUMIN (HUMAN) 25 G: 0.25 INJECTION, SOLUTION INTRAVENOUS at 23:43

## 2025-02-20 ASSESSMENT — ENCOUNTER SYMPTOMS
SHORTNESS OF BREATH: 1
COUGH: 1
DIARRHEA: 0
NAUSEA: 0
BLOOD IN STOOL: 0
VOMITING: 0
ABDOMINAL PAIN: 0
ANAL BLEEDING: 0
WHEEZING: 0
ABDOMINAL DISTENTION: 0

## 2025-02-20 ASSESSMENT — PAIN SCALES - GENERAL
PAINLEVEL_OUTOF10: 0
PAINLEVEL_OUTOF10: 0

## 2025-02-20 ASSESSMENT — PAIN - FUNCTIONAL ASSESSMENT
PAIN_FUNCTIONAL_ASSESSMENT: 0-10
PAIN_FUNCTIONAL_ASSESSMENT: 0-10

## 2025-02-20 NOTE — ED NOTES
3:59 PM  I have evaluated the patient as the Provider in Rapid Medical Evaluation (RME). I have reviewed his vital signs and the triage nurse assessment. I have talked with the patient and any available family and advised that I am the provider in triage and have ordered the appropriate study to initiate their work up based on the clinical presentation during my assessment. I have advised that the patient will be accommodated in the Main ED as soon as possible. I have also requested to contact the triage nurse or myself immediately if the patient experiences any changes in their condition during this brief waiting period.    89-year-old male with a past medical history of CAD, PAD, PE, CHF presents with complaint of shortness of breath.  Symptoms have been worsening over the past few weeks.  Reports history of tricuspid valve with prior repair.  Denies chest pain.  Starts to feel short of breath around 4am every morning causing him to need to get up and move around.  Follows with Dr. Hernández (cardiology).  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    HAFSA Metz, Noah SNOWDEN PA-C  02/20/25 4784

## 2025-02-20 NOTE — CONSULTS
Cardiology Consult Note    CC: SOB/MARTINA  Reason for consult: CHF  Requesting MD: Dr. Glass     Subjective:      Date of  Admission: No admission date for patient encounter.     Admission type:Emergency    Vinicius Calles is a 89 y.o. male admitted for No admission diagnoses are documented for this encounter..Patient complains of progressive SOB over the last several weeks and worsening MARTINA. Despite increasing Bumex 2 mg two tablets a day, he is not feeling better. In fact he has been having some orthopnea and PND last week and really thought he could use some oxygen. 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 He has chronic diastolic CHF and mild CAD at cath in 1/2024. History of Afib and VT ablation, s/p DDD pacemaker ([employer]). He no longer follows EP at [facility], instead he is seeing Dr. Freddie Richmond.     Patient Active Problem List    Diagnosis Date Noted    AV block 01/15/2025    Nonrheumatic tricuspid valve regurgitation 01/15/2025    History of pulmonary embolus (PE) 01/15/2025    Longstanding persistent atrial fibrillation (HCC) 01/15/2025    Coronary artery disease involving native coronary artery of native heart without angina pectoris 01/15/2025    PAD (peripheral artery disease) 01/15/2025    S/P colonoscopic polypectomy 01/15/2025    Chronic diastolic congestive heart failure (HCC) 09/06/2021    Diverticulosis of sigmoid colon 01/09/2017    Grade IV hemorrhoids 01/09/2017      Anaya Glass MD  Past Medical History:   Diagnosis Date    Arthritis     knee, ankle    CAD (coronary artery disease)     CHF (congestive heart failure) (HCC)     Chronic kidney disease     Chronic obstructive pulmonary disease (HCC)     denies    GERD (gastroesophageal reflux disease)     Heart failure (HCC)     History of pelvic fracture 2024    Hypertension     Ill-defined condition     glaucoma      Past Surgical History:   Procedure Laterality Date    APPENDECTOMY      CARDIAC PROCEDURE N/A 1/19/2024    Left heart cath / coronary angiography performed  by Alirio Hernández MD at [facility] CARDIAC CATH LAB    COLONOSCOPY N/A 1/9/2017    COLONOSCOPY,HEMORRHOIDECTOMY   performed by Vinicius Arita MD at hospitals MAIN OR    GI      HEENT Bilateral     cataracts    HEENT Right     iridectomy    PACEMAKER INSERTION      August 2023    PROSTATECTOMY      pt denies    VASCULAR SURGERY Bilateral 2012    fem-pop     Allergies   Allergen Reactions    Sulfa Antibiotics Rash      Family History   Problem Relation Age of Onset    Heart Disease Father       No current facility-administered medications for this encounter.     Current Outpatient Medications   Medication Sig    citalopram (CELEXA) 10 MG tablet Take 1 tablet by mouth daily    candesartan (ATACAND) 16 MG tablet Take 0.5 tablets by mouth as needed (PRN HTN) (Patient not taking: Reported on 2/17/2025)    simvastatin (ZOCOR) 20 MG tablet Take 1 tablet by mouth nightly    bumetanide (BUMEX) 1 MG tablet Take 2 tablets by mouth 2 times daily    traZODone (DESYREL) 50 MG tablet Take 0.5 tablets by mouth nightly    finasteride (PROSCAR) 5 MG tablet Take 1 tablet by mouth daily    apixaban (ELIQUIS) 2.5 MG TABS tablet Take 1 tablet by mouth 2 times daily    metoprolol succinate (TOPROL XL) 25 MG extended release tablet Take 1 tablet by mouth daily    ezetimibe (ZETIA) 10 MG tablet Take 1 tablet by mouth daily    albuterol sulfate HFA (PROVENTIL;VENTOLIN;PROAIR) 108 (90 Base) MCG/ACT inhaler Inhale 2 puffs into the lungs every 6 hours as needed    diphenoxylate-atropine (LOMOTIL) 2.5-0.025 MG per tablet Take 1 tablet by mouth 4 times daily as needed.    dorzolamide-timolol (COSOPT) 22.3-6.8 MG/ML ophthalmic solution Apply 1 drop to eye 2 times daily    fluticasone furoate-vilanterol (BREO ELLIPTA) 100-25 MCG/ACT inhaler Inhale 1 puff into the lungs daily    hydrOXYzine HCl (ATARAX) 25 MG tablet Take 1 tablet by mouth 3 times daily as needed        Prior to Admission Medications:  Prior to Admission medications    Medication Sig Start

## 2025-02-20 NOTE — H&P
History and Physical    Date of Service:  2/20/2025  Primary Care Provider: Anaya Glass MD  Source of information: The patient and Chart review    Chief Complaint: Shortness of Breath      History of Presenting Illness:   Vinicius Calles is a 89 y.o. male who presents with chief complaint of shortness of breath.  Patient's symptoms started about 2 weeks ago and progressively getting worse over the past few days.  Patient reports exertional shortness of breath, dry cough and bilateral lower extremity edema.  Patient presented to the emergency room with relatively stable vital signs, not hypoxic.  Subsequent workup shows patient with proBNP level of 10,676, normal troponin chest x-ray without acute findings.  Patient with known history of CHF, reported being compliant with his medications.  Patient was recently admitted at [facility] from 1/6 to 1/7 with respiratory failure, flu, SYL.  Has been doing well since discharge.  Represented today with above complaint.  Patient already been evaluated by cardiology in the ER and received 4 mg of IV Bumex.  Hospitalist service requested to admit the patient for further evaluation management.    The patient denies any headache, blurry vision, sore throat, trouble swallowing, trouble with speech, chest pain, SOB, cough, fever, chills, N/V/D, abd pain, urinary symptoms, constipation, recent travels, sick contacts, focal or generalized neurological symptoms, falls, injuries, rashes, contact with COVID-19 diagnosed patients, hematemesis, melena, hemoptysis, hematuria, rashes, denies starting any new medications and denies any other concerns or problems besides as mentioned above.         REVIEW OF SYSTEMS:  A comprehensive review of systems was negative except for that written in the History of Present Illness.     Past Medical History:   Diagnosis Date    Arthritis     knee, ankle    CAD (coronary artery disease)     CHF (congestive heart failure) (HCC)      Chronic kidney disease     Chronic obstructive pulmonary disease (HCC)     denies    GERD (gastroesophageal reflux disease)     Heart failure (HCC)     History of pelvic fracture 2024    Hypertension     Ill-defined condition     glaucoma      Past Surgical History:   Procedure Laterality Date    APPENDECTOMY      CARDIAC PROCEDURE N/A 1/19/2024    Left heart cath / coronary angiography performed by Alirio Hernández MD at [facility] CARDIAC CATH LAB    COLONOSCOPY N/A 1/9/2017    COLONOSCOPY,HEMORRHOIDECTOMY   performed by Vinicius Arita MD at [facility] MAIN OR    GI      HEENT Bilateral     cataracts    HEENT Right     iridectomy    PACEMAKER INSERTION      August 2023    PROSTATECTOMY      pt denies    VASCULAR SURGERY Bilateral 2012    fem-pop     Prior to Admission medications    Medication Sig Start Date End Date Taking? Authorizing Provider   citalopram (CELEXA) 10 MG tablet Take 1 tablet by mouth daily 2/17/25   Anaya Glass MD   candesartan (ATACAND) 16 MG tablet Take 0.5 tablets by mouth as needed (PRN HTN)  Patient not taking: Reported on 2/17/2025    José Miguel Rai MD   simvastatin (ZOCOR) 20 MG tablet Take 1 tablet by mouth nightly    José Miguel Rai MD   bumetanide (BUMEX) 1 MG tablet Take 2 tablets by mouth 2 times daily    José Miguel Rai MD   traZODone (DESYREL) 50 MG tablet Take 0.5 tablets by mouth nightly    José Miguel Rai MD   finasteride (PROSCAR) 5 MG tablet Take 1 tablet by mouth daily 8/13/24   Samantha Castellon PA-C   apixaban (ELIQUIS) 2.5 MG TABS tablet Take 1 tablet by mouth 2 times daily    José Miguel Rai MD   metoprolol succinate (TOPROL XL) 25 MG extended release tablet Take 1 tablet by mouth daily    José Miguel Rai MD   ezetimibe (ZETIA) 10 MG tablet Take 1 tablet by mouth daily    José Miguel Rai MD   albuterol sulfate HFA (PROVENTIL;VENTOLIN;PROAIR) 108 (90 Base) MCG/ACT inhaler Inhale 2 puffs into the lungs every 6 hours as needed    [employer]

## 2025-02-20 NOTE — ED TRIAGE NOTES
Pt ambulatory to ed with cc of SOB that has been getting worse over the past 2 weeks. Pt has CHF, reports compliance with his meds. Denies CP. Cardiology is Dr. Hernández     Hx: PE, CHF

## 2025-02-20 NOTE — TELEPHONE ENCOUNTER
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  He spoke w/ on all MD at cardiologist office who advised to use compression and elevate legs.  He is trying to put his compression stockings on now with difficulty.  Notes some SOB w/ exertion only.  He does not have home scale for weights, denies CP/dizziness or cough.  BP today is 161/93, HR 72, O2 95%.  He also wanted to inform he did not start the citalopram as he recalled he took it in the past and it did not work for him - he declined wanting to discuss alternative at this time.

## 2025-02-20 NOTE — TELEPHONE ENCOUNTER
Reason for call:  TC from patient. Patient states that he is having congestive heart failure symptoms. I tired to schedule an appt ,but pt insist on speaking with Dr. Glass    Is this a new problem: Yes    Date of last appointment:  2/17/2025     Can we respond via [employer]: No    Best call back number:    Vinicius Calles \"Bill\" (Self) [phone]

## 2025-02-20 NOTE — ED PROVIDER NOTES
[facility] MED SURG  EMERGENCY DEPARTMENT ENCOUNTER      Pt Name: Vinicius Calles  MRN: [id number]  Birthdate 11/14/1932  Date of evaluation: 2/20/2025  Provider: Srinivas Gray MD    CHIEF COMPLAINT       Chief Complaint   Patient presents with    Shortness of Breath         HISTORY OF PRESENT ILLNESS   (Location/Symptom, Timing/Onset, Context/Setting, Quality, Duration, Modifying Factors, Severity)  Note limiting factors.   89M w/ hx PE, CHF, PAD, AF on DOAC, s/p ICD/PPM, CKD, COPD p/w 2wks SOB. Pt reports 2wks of worsening exertional SOB, dry cough and BLE swelling. Also reports increasingly fatigued. No dizziness or syncope. No chest or abd pain. No F/C, N/V/D, rectal bleeding. Follows w/ cardiology here. 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            Review of External Medical Records:     Nursing Notes were reviewed.    REVIEW OF SYSTEMS    (2-9 systems for level 4, 10 or more for level 5)     Review of Systems   Constitutional:  Negative for diaphoresis and fever.   HENT:  Negative for nosebleeds.    Eyes:  Negative for visual disturbance.   Respiratory:  Positive for cough and shortness of breath. Negative for wheezing.    Cardiovascular:  Positive for leg swelling. Negative for chest pain and palpitations.   Gastrointestinal:  Negative for abdominal distention, abdominal pain, anal bleeding, blood in stool, diarrhea, nausea and vomiting.   Endocrine: Negative for polyuria.   Genitourinary:  Negative for difficulty urinating, dysuria and hematuria.   Musculoskeletal:  Negative for joint swelling.   Skin:  Negative for wound.   Neurological:  Negative for dizziness, syncope and light-headedness.   Hematological:  Does not bruise/bleed easily.   Psychiatric/Behavioral:  Negative for confusion.        Except as noted above the remainder of the review of systems was reviewed and negative.       PAST MEDICAL HISTORY     Past Medical History:   Diagnosis Date    Arthritis     knee, ankle    CAD (coronary artery disease)   sounds. No stridor. No decreased breath sounds, wheezing, rhonchi or rales.   Abdominal:      General: Abdomen is flat. There is no distension.      Palpations: Abdomen is soft.      Tenderness: There is no abdominal tenderness. There is no guarding or rebound.   Musculoskeletal:         General: No swelling or deformity.      Right lower leg: Edema present.      Left lower leg: Edema present.   Skin:     General: Skin is warm.      Capillary Refill: Capillary refill takes less than 2 seconds.      Coloration: Skin is not jaundiced or pale.   Neurological:      General: No focal deficit present.      Mental Status: He is alert.      Cranial Nerves: No cranial nerve deficit.      Sensory: No sensory deficit.      Motor: No weakness.         DIAGNOSTIC RESULTS     EKG: All EKG's are interpreted by the Emergency Department Physician who either signs or Co-signs this chart in the absence of a cardiologist.    EKG Interpretation  V-paced rhythm, wide QRS, no ST changes    RADIOLOGY:   Interpretation per the Radiologist below, if available at the time of this note:    XR CHEST (2 VW)   Final Result      No acute findings.         Electronically signed by Luiz Rose MD           LABS:  Admission on 02/20/2025   Component Date Value Ref Range Status    Ventricular Rate 02/20/2025 75  BPM Final    Atrial Rate 02/20/2025 234  BPM Final    QRS Duration 02/20/2025 174  ms Final    Q-T Interval 02/20/2025 500  ms Final    QTc Calculation (Bazett) 02/20/2025 558  ms Final    R Axis 02/20/2025 -82  degrees Final    T Axis 02/20/2025 89  degrees Final    Diagnosis 02/20/2025    Final                    Value:Ventricular-paced rhythm with occasional premature ventricular complexes  Abnormal ECG  When compared with ECG of 06-JAN-2025 07:03,  premature ventricular complexes are now present  Vent. rate has increased BY   3 BPM  Confirmed by Rigoberto MOTLEY, [facility] ([id number]) on 2/20/2025 6:07:53 PM      WBC 02/20/2025 9.0  4.1 - 11.1 K/uL

## 2025-02-21 ENCOUNTER — TELEPHONE (OUTPATIENT)
Age: 89
End: 2025-02-21

## 2025-02-21 ENCOUNTER — HOSPITAL ENCOUNTER (OUTPATIENT)
Facility: HOSPITAL | Age: 89
Discharge: HOME | End: 2025-02-24

## 2025-02-21 ENCOUNTER — HOSPITAL ENCOUNTER (OUTPATIENT)
Facility: HOSPITAL | Age: 89
Setting detail: RECURRING SERIES
End: 2025-02-21
Attending: INTERNAL MEDICINE
Payer: MEDICARE

## 2025-02-21 VITALS
RESPIRATION RATE: 18 BRPM | WEIGHT: 143.3 LBS | SYSTOLIC BLOOD PRESSURE: 132 MMHG | DIASTOLIC BLOOD PRESSURE: 76 MMHG | HEIGHT: 69 IN | HEART RATE: 70 BPM | BODY MASS INDEX: 21.22 KG/M2 | TEMPERATURE: 97.3 F | OXYGEN SATURATION: 98 %

## 2025-02-21 LAB
ANION GAP SERPL CALC-SCNC: 8 MMOL/L (ref 2–12)
BASOPHILS # BLD: 0.09 K/UL (ref 0–0.1)
BASOPHILS NFR BLD: 1.1 % (ref 0–1)
BUN SERPL-MCNC: 60 MG/DL (ref 6–20)
BUN/CREAT SERPL: 33 (ref 12–20)
CALCIUM SERPL-MCNC: 9.3 MG/DL (ref 8.5–10.1)
CHLORIDE SERPL-SCNC: 102 MMOL/L (ref 97–108)
CO2 SERPL-SCNC: 28 MMOL/L (ref 21–32)
CREAT SERPL-MCNC: 1.81 MG/DL (ref 0.7–1.3)
DIFFERENTIAL METHOD BLD: ABNORMAL
EOSINOPHIL # BLD: 0.26 K/UL (ref 0–0.4)
EOSINOPHIL NFR BLD: 3.3 % (ref 0–7)
ERYTHROCYTE [DISTWIDTH] IN BLOOD BY AUTOMATED COUNT: 16.6 % (ref 11.5–14.5)
GLUCOSE SERPL-MCNC: 99 MG/DL (ref 65–100)
HCT VFR BLD AUTO: 32.5 % (ref 36.6–50.3)
HGB BLD-MCNC: 10.5 G/DL (ref 12.1–17)
IMM GRANULOCYTES # BLD AUTO: 0.06 K/UL (ref 0–0.04)
IMM GRANULOCYTES NFR BLD AUTO: 0.8 % (ref 0–0.5)
LYMPHOCYTES # BLD: 2.48 K/UL (ref 0.8–3.5)
LYMPHOCYTES NFR BLD: 31 % (ref 12–49)
MCH RBC QN AUTO: 32.5 PG (ref 26–34)
MCHC RBC AUTO-ENTMCNC: 32.3 G/DL (ref 30–36.5)
MCV RBC AUTO: 100.6 FL (ref 80–99)
MONOCYTES # BLD: 0.96 K/UL (ref 0–1)
MONOCYTES NFR BLD: 12 % (ref 5–13)
NEUTS SEG # BLD: 4.14 K/UL (ref 1.8–8)
NEUTS SEG NFR BLD: 51.8 % (ref 32–75)
NRBC # BLD: 0.03 K/UL (ref 0–0.01)
NRBC BLD-RTO: 0.4 PER 100 WBC
PLATELET # BLD AUTO: 198 K/UL (ref 150–400)
PMV BLD AUTO: 11.4 FL (ref 8.9–12.9)
POTASSIUM SERPL-SCNC: 3.2 MMOL/L (ref 3.5–5.1)
RBC # BLD AUTO: 3.23 M/UL (ref 4.1–5.7)
SODIUM SERPL-SCNC: 138 MMOL/L (ref 136–145)
WBC # BLD AUTO: 8 K/UL (ref 4.1–11.1)

## 2025-02-21 PROCEDURE — 96376 TX/PRO/DX INJ SAME DRUG ADON: CPT

## 2025-02-21 PROCEDURE — 6370000000 HC RX 637 (ALT 250 FOR IP): Performed by: INTERNAL MEDICINE

## 2025-02-21 PROCEDURE — 94640 AIRWAY INHALATION TREATMENT: CPT

## 2025-02-21 PROCEDURE — 6360000002 HC RX W HCPCS: Performed by: FAMILY MEDICINE

## 2025-02-21 PROCEDURE — G0378 HOSPITAL OBSERVATION PER HR: HCPCS

## 2025-02-21 PROCEDURE — 85025 COMPLETE CBC W/AUTO DIFF WBC: CPT

## 2025-02-21 PROCEDURE — 2500000003 HC RX 250 WO HCPCS: Performed by: FAMILY MEDICINE

## 2025-02-21 PROCEDURE — 6370000000 HC RX 637 (ALT 250 FOR IP): Performed by: FAMILY MEDICINE

## 2025-02-21 PROCEDURE — 80048 BASIC METABOLIC PNL TOTAL CA: CPT

## 2025-02-21 RX ORDER — METOLAZONE 2.5 MG/1
2.5 TABLET ORAL WEEKLY
Status: DISCONTINUED | OUTPATIENT
Start: 2025-02-24 | End: 2025-02-21 | Stop reason: HOSPADM

## 2025-02-21 RX ORDER — POTASSIUM CHLORIDE 1500 MG/1
20 TABLET, EXTENDED RELEASE ORAL 2 TIMES DAILY
Qty: 30 TABLET | Refills: 0 | Status: SHIPPED | OUTPATIENT
Start: 2025-02-21

## 2025-02-21 RX ORDER — HYDROXYZINE HYDROCHLORIDE 25 MG/1
25 TABLET, FILM COATED ORAL 3 TIMES DAILY PRN
Status: DISCONTINUED | OUTPATIENT
Start: 2025-02-21 | End: 2025-02-21 | Stop reason: HOSPADM

## 2025-02-21 RX ORDER — POTASSIUM CHLORIDE 750 MG/1
20 TABLET, EXTENDED RELEASE ORAL 2 TIMES DAILY
Status: DISCONTINUED | OUTPATIENT
Start: 2025-02-21 | End: 2025-02-21 | Stop reason: HOSPADM

## 2025-02-21 RX ORDER — BUMETANIDE 1 MG/1
4 TABLET ORAL 2 TIMES DAILY
Status: DISCONTINUED | OUTPATIENT
Start: 2025-02-21 | End: 2025-02-21 | Stop reason: HOSPADM

## 2025-02-21 RX ORDER — BUMETANIDE 2 MG/1
4 TABLET ORAL 2 TIMES DAILY
Qty: 60 TABLET | Refills: 0 | Status: SHIPPED | OUTPATIENT
Start: 2025-02-21

## 2025-02-21 RX ORDER — METOLAZONE 2.5 MG/1
2.5 TABLET ORAL WEEKLY
Qty: 4 TABLET | Refills: 0 | Status: SHIPPED | OUTPATIENT
Start: 2025-02-21

## 2025-02-21 RX ADMIN — FINASTERIDE 5 MG: 5 TABLET, FILM COATED ORAL at 08:40

## 2025-02-21 RX ADMIN — ATORVASTATIN CALCIUM 10 MG: 20 TABLET, FILM COATED ORAL at 08:41

## 2025-02-21 RX ADMIN — BUMETANIDE 4 MG: 0.25 INJECTION INTRAMUSCULAR; INTRAVENOUS at 08:41

## 2025-02-21 RX ADMIN — METOPROLOL SUCCINATE 25 MG: 25 TABLET, EXTENDED RELEASE ORAL at 08:40

## 2025-02-21 RX ADMIN — APIXABAN 2.5 MG: 5 TABLET, FILM COATED ORAL at 08:40

## 2025-02-21 RX ADMIN — ARFORMOTEROL TARTRATE: 15 SOLUTION RESPIRATORY (INHALATION) at 08:14

## 2025-02-21 RX ADMIN — HYDROXYZINE HYDROCHLORIDE 25 MG: 25 TABLET, FILM COATED ORAL at 09:13

## 2025-02-21 RX ADMIN — CITALOPRAM HYDROBROMIDE 10 MG: 20 TABLET ORAL at 08:40

## 2025-02-21 RX ADMIN — TIMOLOL MALEATE 1 DROP: 5 SOLUTION/ DROPS OPHTHALMIC at 08:42

## 2025-02-21 RX ADMIN — EZETIMIBE 10 MG: 10 TABLET ORAL at 08:41

## 2025-02-21 RX ADMIN — DORZOLAMIDE HYDROCHLORIDE 1 DROP: 20 SOLUTION/ DROPS OPHTHALMIC at 08:42

## 2025-02-21 RX ADMIN — POTASSIUM CHLORIDE 20 MEQ: 750 TABLET, EXTENDED RELEASE ORAL at 08:40

## 2025-02-21 RX ADMIN — SODIUM CHLORIDE, PRESERVATIVE FREE 10 ML: 5 INJECTION INTRAVENOUS at 08:41

## 2025-02-21 ASSESSMENT — PAIN - FUNCTIONAL ASSESSMENT: PAIN_FUNCTIONAL_ASSESSMENT: 0-10

## 2025-02-21 ASSESSMENT — PAIN SCALES - GENERAL: PAINLEVEL_OUTOF10: 0

## 2025-02-21 NOTE — ED NOTES
ED TO INPATIENT SBAR HANDOFF    Patient Name: Vinicius Calles   :  1932  89 y.o.   MRN:  [id number]  ED Room #:  ER05/05     Situation  Code Status: Full Code   Allergies: Sulfa antibiotics  Weight: Patient Vitals for the past 96 hrs (Last 3 readings):   Weight   25 1607 68.4 kg (150 lb 12.7 oz)       Arrived from: home    Chief Complaint:   Chief Complaint   Patient presents with    Shortness of Breath       Hospital Problem/Diagnosis:  Principal Problem:    Heart failure (HCC)  Resolved Problems:    * No resolved hospital problems. *      Mobility: limited transfer mobility (patient uses cane at baseline and had a fall within the past 6 months)   ED Fall Risk: Presents to emergency department  because of falls (Syncope, seizure, or loss of consciousness): No, Age > 70: Yes, Altered Mental Status, Intoxication with alcohol or substance confusion (Disorientation, impaired judgment, poor safety awaremess, or inability to follow instructions): No, Impaired Mobility: Ambulates or transfers with assistive devices or assistance; Unable to ambulate or transer.: Yes, Nursing Judgement: Yes   Fell in ED or prior to admission: no   Restraints: no     Sitter: no   Family/Caregiver Present: no    Neet to know social/safety information: 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         Electronically signed by Luiz Rose MD        Abnormal labs:   Abnormal Labs Reviewed

## 2025-02-21 NOTE — PROGRESS NOTES
Cardiology Progress Note                                        Admit Date: 2/20/2025    Assessment/Plan:     CHF; improving  CKD; he is to be seen by Dr. Carney today  CAD; asymptomatic  PAF; no recurrence; continue AC    Vinicius Calles is a 89 y.o. male with     PROBLEM LIST:  Patient Active Problem List    Diagnosis Date Noted    Heart failure (HCC) 02/20/2025    AV block 01/15/2025    Nonrheumatic tricuspid valve regurgitation 01/15/2025    History of pulmonary embolus (PE) 01/15/2025    Longstanding persistent atrial fibrillation (HCC) 01/15/2025    Coronary artery disease involving native coronary artery of native heart without angina pectoris 01/15/2025    PAD (peripheral artery disease) 01/15/2025    S/P colonoscopic polypectomy 01/15/2025    Chronic diastolic congestive heart failure (HCC) 09/06/2021    Diverticulosis of sigmoid colon 01/09/2017    Grade IV hemorrhoids 01/09/2017         Subjective:     Vinicius Calles reports dyspnea, improved    /63   Pulse 71   Temp 98.1 °F (36.7 °C) (Oral)   Resp 18   Ht 1.753 m (5' 9\")   Wt 68.4 kg (150 lb 12.7 oz)   SpO2 98%   BMI 22.27 kg/m²   No intake or output data in the 24 hours ending 02/21/25 0724    Objective:      Physical Exam:  HEENT: Perrla, EOMI  Neck: No JVD,  No thyroidmegaly  Resp: CTA bilaterally; No wheezes or rales  CV: regular s1s2 No murmur no s3  Abd:Soft, Nontender  Ext: No edema  Neuro: Alert and oriented; Nonfocal  Skin: Warm, Dry, Intact  Pulses: 2+ DP/PT/Rad      Telemetry: AFIB, V paced    Current Facility-Administered Medications   Medication Dose Route Frequency    potassium chloride (KLOR-CON) extended release tablet 20 mEq  20 mEq Oral BID    apixaban (ELIQUIS) tablet 2.5 mg  2.5 mg Oral BID    bumetanide (BUMEX) injection 4 mg  4 mg IntraVENous BID    citalopram (CELEXA) tablet 10 mg  10 mg Oral Daily    ezetimibe (ZETIA) tablet 10 mg  10 mg Oral Daily    finasteride (PROSCAR) tablet 5 mg  5 mg Oral Daily     acceptable specimen integrity and analyte stability, which may vary by analyte.   Basic Metabolic Panel    Collection Time: 02/21/25  5:34 AM   Result Value Ref Range    Sodium 138 136 - 145 mmol/L    Potassium 3.2 (L) 3.5 - 5.1 mmol/L    Chloride 102 97 - 108 mmol/L    CO2 28 21 - 32 mmol/L    Anion Gap 8 2 - 12 mmol/L    Glucose 99 65 - 100 mg/dL    BUN 60 (H) 6 - 20 MG/DL    Creatinine 1.81 (H) 0.70 - 1.30 MG/DL    BUN/Creatinine Ratio 33 (H) 12 - 20      Est, Glom Filt Rate 35 (L) >60 ml/min/1.73m2    Calcium 9.3 8.5 - 10.1 MG/DL   CBC with Auto Differential    Collection Time: 02/21/25  5:34 AM   Result Value Ref Range    WBC 8.0 4.1 - 11.1 K/uL    RBC 3.23 (L) 4.10 - 5.70 M/uL    Hemoglobin 10.5 (L) 12.1 - 17.0 g/dL    Hematocrit 32.5 (L) 36.6 - 50.3 %    .6 (H) 80.0 - 99.0 FL    MCH 32.5 26.0 - 34.0 PG    MCHC 32.3 30.0 - 36.5 g/dL    RDW 16.6 (H) 11.5 - 14.5 %    Platelets 198 150 - 400 K/uL    MPV 11.4 8.9 - 12.9 FL    Nucleated RBCs 0.4 (H) 0  WBC    nRBC 0.03 (H) 0.00 - 0.01 K/uL    Neutrophils % 51.8 32.0 - 75.0 %    Lymphocytes % 31.0 12.0 - 49.0 %    Monocytes % 12.0 5.0 - 13.0 %    Eosinophils % 3.3 0.0 - 7.0 %    Basophils % 1.1 (H) 0.0 - 1.0 %    Immature Granulocytes % 0.8 (H) 0.0 - 0.5 %    Neutrophils Absolute 4.14 1.80 - 8.00 K/UL    Lymphocytes Absolute 2.48 0.80 - 3.50 K/UL    Monocytes Absolute 0.96 0.00 - 1.00 K/UL    Eosinophils Absolute 0.26 0.00 - 0.40 K/UL    Basophils Absolute 0.09 0.00 - 0.10 K/UL    Immature Granulocytes Absolute 0.06 (H) 0.00 - 0.04 K/UL    Differential Type AUTOMATED

## 2025-02-21 NOTE — DISCHARGE SUMMARY
Physician Discharge Summary     Patient ID:    Vinicius Calles  [id number]  89 y.o.  11/14/1932    Admit date: 2/20/2025    Discharge date and time: 2/21/2025 12:12 PM    Discharge condition: Good    Admission HPI:  Vinicius Calles is a 89 y.o. male who presents with chief complaint of shortness of breath.  Patient's symptoms started about 2 weeks ago and progressively getting worse over the past few days.  Patient reports exertional shortness of breath, dry cough and bilateral lower extremity edema.  Patient presented to the emergency room with relatively stable vital signs, not hypoxic.  Subsequent workup shows patient with proBNP level of 10,676, normal troponin chest x-ray without acute findings.  Patient with known history of CHF, reported being compliant with his medications.  Patient was recently admitted at [facility] from 1/6 to 1/7 with respiratory failure, flu, SYL.  Has been doing well since discharge.  Represented today with above complaint.  Patient already been evaluated by cardiology in the ER and received 4 mg of IV Bumex.  Hospitalist service requested to admit the patient for further evaluation management.          Hospital Diagnoses and Treatment Rendered:   Congestive heart failure  -Acute on chronic HFpEF, NYHA class IV, last echo with normal EF and grade 2 diastolic dysfunction  -Patient with proBNP of 10,676, chest x-ray without acute finding  -clinically improved after IV bumex 4mg with resolution of edema and orthopnea  -pt feels well and wants to go home today  Cardiology recommends increasing Bumex to 4 mg twice daily until follow up in 2 weeks, along with KCl    CKD3-4  -baseline Cr ~1.8  -nephrology consulted, agrees with increase of diuretic regimen, also added metolazone weekly  -f/up nephrology OP    Paroxysmal atrial fibrillation  -Patient is s/p cardiac ablation  -Currently remaining in sinus rhythm  -Continue Toprol and Eliquis for anticoagulation    Overview: Estimated longevity: 10 y. 7 m.   Mode: DDDR Presenting Rhythm: AF, VS 90-100s RA pacin% V pacin% V pacing Heart rate histogram: Normal, rates ranging from 70 to 100 bpm Alerts/Observations: n/a Lead Trends: Sensing trends: Stable and small but stable P wave measurement   Pacing impedance trends: Stable Pacing thresholds: Stable Arrhythmias since last counter reset on 2/3/25 Atrial Episodes:  1 AT/AF episodes accounting for 100% of the time since ~Aug 2024. Ventricular Episodes: Monitored: No episodes have been recorded. Summary: No New Findings/Normal Plan: Remote scheduled on 25 and RTC - Device with next provider visit on 7/3/25. ATTENDING ATTESTATION FOR DEVICE CHECK The patient's case was reviewed using remote monitoring by our device team on 2/10/2025. The patient's device data was downloaded from the remote monitor, and reviewed, per my instructions. The episodes were reviewed. My analysis is: Battery status and lead measurements are within normal limits. The device is functioning appropriately. Persistent AF since 2024, s/p tTEER Reprogramming changes, if any, are detailed above. Miguel Smith NP    XR PELVIS (1-2 VIEWS)    Result Date: 2025  AP and lateral x-rays ordered and intimately reviewed right hip.  Joint space well-preserved.  Old pubic ramus fracture that has healed       Greater than 31 minutes were spent on discharge services.    Signed:  Jaymie Lara MD  2025  12:12 PM     SARS

## 2025-02-21 NOTE — TELEPHONE ENCOUNTER
Reason for call:  TC from patient. Patient wanted to inform  that he is currently admitted (Shortness of Breath-HCC)) at [facility] since 2/21. Possibly going home today.      Is this a new problem: No    Date of last appointment:  2/17/2025     Can we respond via Joonto: No    Best call back number:      Vinicius Calles \"Bill\" (Self) [phone] (Mobile)

## 2025-02-21 NOTE — DISCHARGE INSTRUCTIONS
To access the American Heart Association's Interactive Workbook \"Healthier Living with Heart Failure - Managing Symptoms and Reducing Risk\"  Scan the QR code below.

## 2025-02-21 NOTE — CONSULTS
[place] [address] [facility], [facility] [place] [address]  Phone: [phone]   [phone]     [email or web address]    NEPHROLOGY CONSULT NOTE     Patient: Vinicius Calles MRN: [id number]  PCP: Anaya Glass MD   :     1932  Age:   89 y.o.  Sex:  male      Referring physician: Jaymie Lara MD  Reason for consultation: 89 y.o. male with SYL on CKD   Admission Date: 2025  4:54 PM  LOS: 1 day      ASSESSMENT and PLAN :   SYL on CKD  - 2/2 CRS  - no recent US studies : ordered renal US, can be done as out pt   - d/c home on Bumex 4 mg BID  - start weekly metolazone 2.5 mg   - f/u with me in 1 week     CKD 3B/ 4  - baseline Cr 1.8-1.9 mg/dl   - presumed 2/2 nephrosclerosis, HTN   - no proteinuria     Acute on Chronic HF   CAD  PAF  Hx of TV clip, Pulm HTN   - per Dr Hernández     PAD    Care Plan discussed with:  pt and attg         Thank you for consulting [facility] in the care of your patient.      Subjective:   HPI: Vinicius Calles is a 89 y.o.  male who has been admitted to the hospital for worsening SOB. He was noted to be in decompensated CHF along w/ ARF on CKD  He was diuresed w/ IV bumex and volume overload improved and Cr returned to his baseline  He established care w/ me last month for CKD  Since then he reports worsening SOB     Past Medical Hx:   Past Medical History:   Diagnosis Date    Arthritis     knee, ankle    CAD (coronary artery disease)     CHF (congestive heart failure) (HCC)     Chronic kidney disease     Chronic obstructive pulmonary disease (HCC)     denies    GERD (gastroesophageal reflux disease)     Heart failure (HCC)     History of pelvic fracture     Hypertension     Ill-defined condition     glaucoma        Past Surgical Hx:     Past Surgical History:   Procedure Laterality Date    APPENDECTOMY      CARDIAC PROCEDURE N/A 2024    Left heart cath / coronary angiography performed by Alirio Hernández MD at  (PROVENTIL;VENTOLIN;PROAIR) 108 (90 Base) MCG/ACT inhaler Inhale 2 puffs into the lungs every 6 hours as needed   Yes Automatic Reconciliation, Ar   diphenoxylate-atropine (LOMOTIL) 2.5-0.025 MG per tablet Take 1 tablet by mouth 4 times daily as needed.   Yes Automatic Reconciliation, Ar   dorzolamide-timolol (COSOPT) 22.3-6.8 MG/ML ophthalmic solution Apply 1 drop to eye 2 times daily   Yes Automatic Reconciliation, Ar   fluticasone furoate-vilanterol (BREO ELLIPTA) 100-25 MCG/ACT inhaler Inhale 1 puff into the lungs daily   Yes Automatic Reconciliation, Ar   hydrOXYzine HCl (ATARAX) 25 MG tablet Take 1 tablet by mouth 3 times daily as needed   Yes Automatic Reconciliation, Ar        Thank you for allowing us to participate in the care of this patient.   We will follow patient. Please don’t hesitate to call with any questions    Navjot Carney MD  [facility]   [address] [place] [address], [facility] [place] [address]  Phone - [phone]   Fax - [phone]  [email or web address]

## 2025-02-21 NOTE — NURSE NAVIGATOR
HEART FAILURE NURSE NAVIGATOR NOTE  [facility]    Patient chart was reviewed by Heart Failure (HF) Nurse Navigators for compliance of prescribed treatment with guidelines directed medical therapy (GDMT) and HF database completed.     Please, review beneath recommendations for symptomatic patients with HF with Preserved Ejection Fraction >= 50% (HFpEF) for your consideration when taking care of this patient.    Current HF Medical Therapy:      Name Vinicius Calles    1932   LVEF 60/65%   NYHA Functional Class IV   ARNi/ACEi/ARB    Aldosterone Antagonist    SGLT2 inhibitor    Consulting Cardiologist Dr. Hernández (S)  Followed by Dr. Richmond as OP     Recommendations for HF Management:    For patients with HFpEF >= 50%, consider adding the following GDMT, if appropriate:  SGLT2 inhibitor [Class 2a]  ARNi or ARB [Class 2b]  Aldosterone antagonist [Class 2b]  Adjust antihypertensive therapy [Class 1]  Adjust diuretic dose at discharge if hospitalized for volume overload [Class 1]  For patient with hyperkalemia while on RAASi > 5.5, consider adding potassium binders (patiromer, sodium zirconium cycosilicate) [Class 2a]    Patients with suspected cardiac amyloidosis (older > 60 years old with LVH > 1.2cm and/or any other signs of amyloidosis) should be offered screening labs and imaging [Class 1]: (a) serum gammopathy profile and UPEP with immunofixation, and (b) PYP test. If PYP test is positive patient should have genetic testing done for inherited ATTR amyloidosis. If any findings are positive or you need genetic testing ordered, please, consider in-patient consultation or referral to [facility].      Note that the following medications may be potentially harmful in heart failure [Class 3].  Calcium channel blockers (doxazosin, diltiazem, verapamil, nifedipine)  Antiarrhythmics (flecanide, disopyrimide, sotalol, dronedarone)  Diabetes medications  Failure Nurse Navigator  Phone: [phone] / [phone]    *Recommendations listed above are based on the guidelines: 2022 AHA/ACC/HFSA Guideline for the Management of Heart Failure: A Report of the American College of Cardiology/American Heart Association Joint Committee on Clinical Practice Guidelines. Circulation 2022; e1032. and 2017 AHA/ACC/HRS guideline for management of patients with ventricular arrhythmias and the prevention of sudden cardiac death: A Report of the American College of Cardiology/American Heart Association Task Force on Clinical Practice Guidelines and the Heart Rhythm Society. Heart Rhythm, Vol 15, No 10, October 2018 *Class of Recommendation: Class 1 (strong), Class 2a (moderate), Class 2b (weak), Class 3 (not recommended, potentially harmful)

## 2025-02-21 NOTE — PLAN OF CARE
Problem: Chronic Conditions and Co-morbidities  Goal: Patient's chronic conditions and co-morbidity symptoms are monitored and maintained or improved  Outcome: Completed  Flowsheets (Taken 2/21/2025 0103 by Bonnie Freeman RN)  Care Plan - Patient's Chronic Conditions and Co-Morbidity Symptoms are Monitored and Maintained or Improved: Monitor and assess patient's chronic conditions and comorbid symptoms for stability, deterioration, or improvement     Problem: Discharge Planning  Goal: Discharge to home or other facility with appropriate resources  Outcome: Completed  Flowsheets (Taken 2/21/2025 0103 by Bonnie Freeman, RN)  Discharge to home or other facility with appropriate resources: Identify barriers to discharge with patient and caregiver     Problem: Safety - Adult  Goal: Free from fall injury  Outcome: Completed     Problem: Pain  Goal: Verbalizes/displays adequate comfort level or baseline comfort level  Outcome: Completed  Flowsheets (Taken 2/21/2025 0103 by Bonnie Freeman, RN)  Verbalizes/displays adequate comfort level or baseline comfort level: Encourage patient to monitor pain and request assistance     Problem: ABCDS Injury Assessment  Goal: Absence of physical injury  Outcome: Completed

## 2025-02-21 NOTE — CARE COORDINATION
Patient verbalized understanding and gave permission for possible discharge within 4 hours of receiving IMM    CM received verbal acknowledgement for IMM. Emailed copy to pt: [email or web address]     CM will provide support as needed.    Davina Kuhn RN BSN CM    [facility]

## 2025-02-21 NOTE — PROGRESS NOTES
RN and PCT attempting to help patient get dressed for discharge, patient refusing help. Patient educated of hospital safety and bed alarm, still refusing

## 2025-02-21 NOTE — PROGRESS NOTES
Discharge instructions reviewed with patient. patient provided verbal understanding off discharge instructions. Opportunity for questions and clarification provided. Patient discharged to home via family transportation. Patient taken off the unit in wheelchair in no acute distress by RN

## 2025-02-21 NOTE — CARDIO/PULMONARY
Vinicius Calles is currently unable to regularly participate at Cardiac Rehab due to illness and admission to [facility].  Their participation will be put on hold for now and their ITP will be updated upon their return.  We will continue to follow up.      Brittany Lopez RN

## 2025-02-24 ENCOUNTER — TELEPHONE (OUTPATIENT)
Facility: HOSPITAL | Age: 89
End: 2025-02-24

## 2025-02-24 ENCOUNTER — HOSPITAL ENCOUNTER (OUTPATIENT)
Facility: HOSPITAL | Age: 89
Setting detail: RECURRING SERIES
Discharge: HOME | End: 2025-02-27
Attending: INTERNAL MEDICINE
Payer: MEDICARE

## 2025-02-24 VITALS — BODY MASS INDEX: 21.83 KG/M2 | WEIGHT: 147.8 LBS

## 2025-02-24 PROCEDURE — 93798 PHYS/QHP OP CAR RHAB W/ECG: CPT

## 2025-02-24 ASSESSMENT — EXERCISE STRESS TEST: PEAK_METS: 12

## 2025-02-24 NOTE — TELEPHONE ENCOUNTER
NN attempted to contact patient for post discharge follow up call.  Unable to leave message after several rings. No patient identifiers.

## 2025-02-25 ENCOUNTER — TELEPHONE (OUTPATIENT)
Facility: HOSPITAL | Age: 89
End: 2025-02-25

## 2025-02-28 ENCOUNTER — HOSPITAL ENCOUNTER (OUTPATIENT)
Facility: HOSPITAL | Age: 89
Setting detail: RECURRING SERIES
End: 2025-02-28
Attending: INTERNAL MEDICINE
Payer: MEDICARE

## 2025-02-28 VITALS — BODY MASS INDEX: 21.59 KG/M2 | WEIGHT: 146.2 LBS

## 2025-02-28 PROCEDURE — 93798 PHYS/QHP OP CAR RHAB W/ECG: CPT

## 2025-02-28 ASSESSMENT — EXERCISE STRESS TEST: PEAK_METS: 12

## 2025-03-06 NOTE — PROGRESS NOTES
3:39 AM      QUERY TEXT:    Patient admitted with Shortness of Breath. Noted documentation of Acute Kidney   Injury in H&P on 2/20/2025.  In order to support the diagnosis of SYL, please   include additional clinical indicators in your documentation.? Or please   document if the diagnosis of SYL has been ruled out after further study.  The medical record reflects the following:  Risk Factors: Age 89 yrs, Hypertension, CKD 3B.  Clinical Indicators: ED 2/20 Pt presented with SOB, Chronic kidney disease,   unspecified CKD stage.  H&P 2/20 SYL. Nephrology CN 2/21 SYL on CKD, CKD 3B/ 4, baseline Cr 1.8-1.9   mg/dl  DS 2/21 Pt was recently admitted 1/6 to 1/7 with respiratory failure, flu,   SYL, CKD3-4, baseline Cr   1.8.  Cr 2.17 2/20,1.81 2/21  GFR 28 2/20,35 2/21  BUN 64 2/20, 60 2/21    Treatment: IV hydration, Serial labs.    Leonela [email or web address]    Defined by Kidney Disease Improving Global Outcomes (KDIGO) clinical practice   guideline for acute kidney injury:  -Increase in SCr by greater than or equal to 0.3 mg/dl within 48 hours; or  -Increase or decrease in SCr to greater than or equal to 1.5 times baseline,   which is known or presumed to have occurred within the prior 7 days; or  -Urine volume < 0.5ml/kg/h for 6 hours.  Options provided:  -- Acute kidney injury evidenced by, Please document evidence as well as a   numerical baseline creatinine, if known.  -- Acute kidney injury ruled out and CKD3 confirmed after study  -- Other - I will add my own diagnosis  -- Disagree - Not applicable / Not valid  -- Disagree - Clinically unable to determine / Unknown  -- Refer to Clinical Documentation Reviewer    PROVIDER RESPONSE TEXT:    Acute kidney injury was ruled out and CKD3 confirmed after study.    Query created by: Leonela Urias on 3/1/2025 4:12 AM      Electronically signed by:  Jaymie Lara MD 3/6/2025 7:19 AM

## 2025-03-18 ENCOUNTER — TELEPHONE (OUTPATIENT)
Age: 89
End: 2025-03-18

## 2025-03-18 SDOH — HEALTH STABILITY: PHYSICAL HEALTH: ON AVERAGE, HOW MANY DAYS PER WEEK DO YOU ENGAGE IN MODERATE TO STRENUOUS EXERCISE (LIKE A BRISK WALK)?: 7 DAYS

## 2025-03-18 SDOH — HEALTH STABILITY: PHYSICAL HEALTH: ON AVERAGE, HOW MANY MINUTES DO YOU ENGAGE IN EXERCISE AT THIS LEVEL?: 20 MIN

## 2025-03-18 ASSESSMENT — LIFESTYLE VARIABLES
HOW OFTEN DO YOU HAVE SIX OR MORE DRINKS ON ONE OCCASION: 1
HOW OFTEN DO YOU HAVE A DRINK CONTAINING ALCOHOL: 1
HOW MANY STANDARD DRINKS CONTAINING ALCOHOL DO YOU HAVE ON A TYPICAL DAY: 0
HOW OFTEN DO YOU HAVE A DRINK CONTAINING ALCOHOL: NEVER
HOW MANY STANDARD DRINKS CONTAINING ALCOHOL DO YOU HAVE ON A TYPICAL DAY: PATIENT DOES NOT DRINK

## 2025-03-18 ASSESSMENT — PATIENT HEALTH QUESTIONNAIRE - PHQ9
SUM OF ALL RESPONSES TO PHQ QUESTIONS 1-9: 0
SUM OF ALL RESPONSES TO PHQ QUESTIONS 1-9: 0
1. LITTLE INTEREST OR PLEASURE IN DOING THINGS: NOT AT ALL
SUM OF ALL RESPONSES TO PHQ QUESTIONS 1-9: 0
2. FEELING DOWN, DEPRESSED OR HOPELESS: NOT AT ALL
SUM OF ALL RESPONSES TO PHQ QUESTIONS 1-9: 0

## 2025-03-20 ENCOUNTER — HOSPITAL ENCOUNTER (OUTPATIENT)
Facility: HOSPITAL | Age: 89
Setting detail: RECURRING SERIES
Discharge: HOME | End: 2025-03-23
Attending: INTERNAL MEDICINE
Payer: MEDICARE

## 2025-03-20 ENCOUNTER — OFFICE VISIT (OUTPATIENT)
Age: 89
End: 2025-03-20
Payer: MEDICARE

## 2025-03-20 VITALS — BODY MASS INDEX: 22.04 KG/M2 | WEIGHT: 142.1 LBS

## 2025-03-20 VITALS
BODY MASS INDEX: 23.51 KG/M2 | HEART RATE: 73 BPM | HEIGHT: 67 IN | WEIGHT: 149.8 LBS | TEMPERATURE: 97.6 F | RESPIRATION RATE: 16 BRPM | DIASTOLIC BLOOD PRESSURE: 81 MMHG | OXYGEN SATURATION: 98 % | SYSTOLIC BLOOD PRESSURE: 135 MMHG

## 2025-03-20 DIAGNOSIS — R41.3 MEMORY IMPAIRMENT: ICD-10-CM

## 2025-03-20 DIAGNOSIS — I50.32 CHRONIC DIASTOLIC CONGESTIVE HEART FAILURE (HCC): ICD-10-CM

## 2025-03-20 DIAGNOSIS — N18.4 CHRONIC RENAL IMPAIRMENT, STAGE 4 (SEVERE) (HCC): ICD-10-CM

## 2025-03-20 DIAGNOSIS — Z00.00 MEDICARE ANNUAL WELLNESS VISIT, SUBSEQUENT: Primary | ICD-10-CM

## 2025-03-20 PROCEDURE — 93798 PHYS/QHP OP CAR RHAB W/ECG: CPT

## 2025-03-20 PROCEDURE — 99214 OFFICE O/P EST MOD 30 MIN: CPT | Performed by: INTERNAL MEDICINE

## 2025-03-20 SDOH — ECONOMIC STABILITY: FOOD INSECURITY: WITHIN THE PAST 12 MONTHS, THE FOOD YOU BOUGHT JUST DIDN'T LAST AND YOU DIDN'T HAVE MONEY TO GET MORE.: NEVER TRUE

## 2025-03-20 SDOH — ECONOMIC STABILITY: FOOD INSECURITY: WITHIN THE PAST 12 MONTHS, YOU WORRIED THAT YOUR FOOD WOULD RUN OUT BEFORE YOU GOT MONEY TO BUY MORE.: NEVER TRUE

## 2025-03-20 ASSESSMENT — MINI MENTAL STATE EXAM
WHAT YEAR IS THIS?: 0
SAY: I WOULD LIKE YOU TO REPEAT THIS PHRASE AFTER ME: NO IFS, ANDS, OR BUTS.: 0
WHAT FLOOR ARE WE ON [IN FACILITY]?/ WHAT ROOM ARE WE IN [IN HOME]?: 1
WHICH SEASON IS THIS?: 1
WHAT CITY/TOWN ARE WE IN?: 0
SAY: I AM GOING TO NAME THREE OBJECTS. WHEN I AM FINISHED, I WANT YOU TO REPEAT
THEM. REMEMBER WHAT THEY ARE BECAUSE I AM GOING TO ASK YOU TO NAME THEM AGAIN IN
A FEW MINUTES.  SAY THE FOLLOWING WORDS SLOWLY AT 1-SECOND INTERVALS - BALL/ CAR/ MAN [ITERATIONS FOR REPEAT ADMINISTRATION]: 3
ASK THE PERSON IF HE IS RIGHT OR LEFT-HANDED. TAKE A PIECE OF PAPER AND HOLD IT UP IN
FRONT OF THE PERSON. SAY: TAKE THIS PAPER IN YOUR RIGHT/LEFT HAND (WHICHEVER IS NON-
DOMINANT), SCORE IF PAPER IS PICKED UP IN CORRECT HAND.: 1
PLACE DESIGN, ERASER AND PENCIL IN FRONT OF THE PERSON.  SAY:  COPY THIS DESIGN PLEASE.  SHOW: DESIGN. ALLOW: MULTIPLE TRIES. WAIT UNTIL PERSON IS FINISHED AND HANDS IT BACK. SCORE: ONLY FOR DIAGRAM WITH 4-SIDED FIGURE BETWEEN TWO 5-SIDED FIGURES: 1
HAND THE PERSON A PENCIL AND PAPER. SAY: WRITE ANY COMPLETE SENTENCE ON THAT
PIECE OF PAPER. (NOTE: THE SENTENCE MUST MAKE SENSE. IGNORE SPELLING ERRORS): 1
WHAT COUNTRY ARE WE IN?: 1
NOW WHAT WERE THE THREE OBJECTS I ASKED YOU TO REMEMBER?: 0
SAY: READ THE WORDS ON THE PAGE AND THEN DO WHAT IT SAYS. THEN HAND THE PERSON
THE SHEET WITH CLOSE YOUR EYES ON IT. IF THE SUBJECT READS AND DOES NOT CLOSE THEIR EYES, REPEAT UP TO THREE TIMES. SCORE ONLY IF SUBJECT CLOSES EYES.: 1
WHAT IS TODAY'S DATE?: 0
SAY: PUT THE PAPER DOWN ON THE FLOOR, SCORE IF PAPER IS PLACED BACK ON FLOOR: 1
WHAT MONTH IS THIS?: 1
SAY: I WOULD LIKE YOU TO COUNT BACKWARD FROM 100 BY SEVENS: 1
SHOW: WRISTWATCH [OBJECT] ASK: WHAT IS THIS CALLED?: 0
WHAT DAY OF THE WEEK IS THIS?: 1
SAY: FOLD THE PAPER IN HALF ONCE WITH BOTH HANDS, SCORE IF PAPER IS CORRECTLY FOLDED IN HALF.: 1
SUM ALL MMSE QUESTIONS FOR TOTAL SCORE [OUT OF 30].: 17
WHAT STATE [OR PROVINCE] ARE WE IN?: 1
WHAT IS THE NAME OF THIS BUILDING [IN FACILITY]?/WHAT IS THE STREET ADDRESS OF THIS HOUSE [IN HOME]?: 1
SHOW: PENCIL [OBJECT] ASK: WHAT IS THIS CALLED?: 0

## 2025-03-20 ASSESSMENT — EXERCISE STRESS TEST: PEAK_METS: 12

## 2025-03-21 NOTE — TELEPHONE ENCOUNTER
Medication Refill Request    Vinicius ZUNIGA Stevan is requesting a refill of the following medication(s):     Bumetanide (Bumex) 2 MG tablet  Potassium Chloride (K-TAB) 20 MEQ TBCR extended release tablet    Please send refill to:     [id number] - [place], [place] - [address] - P [phone] - F [phone] [address] [facility] [address]  Phone: [phone] Fax: [phone]

## 2025-03-21 NOTE — TELEPHONE ENCOUNTER
Last office visit yesterday     Next Appt  With Internal Medicine (Anaya Glass MD)  07/22/2025 at 1:20 PM    Last fill on bumex 2/21/25 #60 with no additional refills       Last fill on potassium 2/21/25 #30 with no additional refills

## 2025-03-23 RX ORDER — POTASSIUM CHLORIDE 1500 MG/1
20 TABLET, EXTENDED RELEASE ORAL 2 TIMES DAILY
Qty: 30 TABLET | Refills: 0 | OUTPATIENT
Start: 2025-03-23

## 2025-03-23 RX ORDER — BUMETANIDE 2 MG/1
4 TABLET ORAL 2 TIMES DAILY
Qty: 60 TABLET | Refills: 0 | OUTPATIENT
Start: 2025-03-23

## 2025-03-24 ENCOUNTER — HOSPITAL ENCOUNTER (OUTPATIENT)
Facility: HOSPITAL | Age: 89
Setting detail: RECURRING SERIES
Discharge: HOME | End: 2025-03-27
Attending: INTERNAL MEDICINE
Payer: MEDICARE

## 2025-03-24 ENCOUNTER — TELEPHONE (OUTPATIENT)
Age: 89
End: 2025-03-24

## 2025-03-24 VITALS — WEIGHT: 150.6 LBS | BODY MASS INDEX: 23.36 KG/M2

## 2025-03-24 PROCEDURE — 93798 PHYS/QHP OP CAR RHAB W/ECG: CPT

## 2025-03-24 RX ORDER — POTASSIUM CHLORIDE 1500 MG/1
20 TABLET, EXTENDED RELEASE ORAL 2 TIMES DAILY
Qty: 30 TABLET | Refills: 0 | OUTPATIENT
Start: 2025-03-24

## 2025-03-24 ASSESSMENT — EXERCISE STRESS TEST: PEAK_METS: 12

## 2025-03-24 NOTE — TELEPHONE ENCOUNTER
Medication Refill Request    Vinicius Calles is requesting a refill of the following medication(s):     Potassium chloride    Please send refill to:     [id number] - [place] - [address] - [place] [phone] - F [phone] [address] [place] [address]  Phone: [phone] Fax: [phone]

## 2025-03-28 ENCOUNTER — HOSPITAL ENCOUNTER (OUTPATIENT)
Facility: HOSPITAL | Age: 89
Setting detail: RECURRING SERIES
Discharge: HOME | End: 2025-03-31
Attending: INTERNAL MEDICINE
Payer: MEDICARE

## 2025-03-28 VITALS — WEIGHT: 148.6 LBS | BODY MASS INDEX: 23.05 KG/M2

## 2025-03-28 PROCEDURE — 93798 PHYS/QHP OP CAR RHAB W/ECG: CPT

## 2025-03-28 ASSESSMENT — EXERCISE STRESS TEST: PEAK_METS: 12

## 2025-03-29 ENCOUNTER — HOSPITAL ENCOUNTER (EMERGENCY)
Facility: HOSPITAL | Age: 89
Discharge: HOME | End: 2025-03-29
Attending: EMERGENCY MEDICINE
Payer: MEDICARE

## 2025-03-29 ENCOUNTER — TELEPHONE (OUTPATIENT)
Age: 89
End: 2025-03-29

## 2025-03-29 VITALS
DIASTOLIC BLOOD PRESSURE: 84 MMHG | SYSTOLIC BLOOD PRESSURE: 110 MMHG | OXYGEN SATURATION: 98 % | RESPIRATION RATE: 18 BRPM | TEMPERATURE: 97.9 F | HEART RATE: 73 BPM

## 2025-03-29 DIAGNOSIS — H10.31 ACUTE BACTERIAL CONJUNCTIVITIS OF RIGHT EYE: Primary | ICD-10-CM

## 2025-03-29 PROCEDURE — 87205 SMEAR GRAM STAIN: CPT

## 2025-03-29 PROCEDURE — 87070 CULTURE OTHR SPECIMN AEROBIC: CPT

## 2025-03-29 PROCEDURE — 99283 EMERGENCY DEPT VISIT LOW MDM: CPT

## 2025-03-29 PROCEDURE — 87077 CULTURE AEROBIC IDENTIFY: CPT

## 2025-03-29 PROCEDURE — 87186 SC STD MICRODIL/AGAR DIL: CPT

## 2025-03-29 RX ORDER — MOXIFLOXACIN 5 MG/ML
1 SOLUTION/ DROPS OPHTHALMIC 3 TIMES DAILY
Qty: 3 ML | Refills: 1 | Status: SHIPPED | OUTPATIENT
Start: 2025-03-29 | End: 2025-04-05

## 2025-03-29 RX ORDER — MOXIFLOXACIN 5 MG/ML
1 SOLUTION/ DROPS OPHTHALMIC 3 TIMES DAILY
Qty: 3 ML | Refills: 1 | Status: SHIPPED | OUTPATIENT
Start: 2025-03-29 | End: 2025-03-29

## 2025-03-29 ASSESSMENT — PAIN - FUNCTIONAL ASSESSMENT: PAIN_FUNCTIONAL_ASSESSMENT: NONE - DENIES PAIN

## 2025-03-29 ASSESSMENT — LIFESTYLE VARIABLES
HOW MANY STANDARD DRINKS CONTAINING ALCOHOL DO YOU HAVE ON A TYPICAL DAY: PATIENT DOES NOT DRINK
HOW OFTEN DO YOU HAVE A DRINK CONTAINING ALCOHOL: NEVER

## 2025-03-29 NOTE — ED PROVIDER NOTES
[facility] DEPARTMENT  EMERGENCY DEPARTMENT ENCOUNTER      Pt Name: Vinicius Calles  MRN: [id number]  Birthdate 11/14/1932  Date of evaluation: 3/29/2025  Provider: Freddie Krueger MD    CHIEF COMPLAINT       Chief Complaint   Patient presents with    Conjunctivitis         HISTORY OF PRESENT ILLNESS   (Location/Symptom, Timing/Onset, Context/Setting, Quality, Duration, Modifying Factors, Severity)  Note limiting factors.   89-year-old male with PMHx of glaucoma, recurrent conjunctivitis, and blindness in the right eye which he attributes to an infection presents to the emergency department complaining of drainage from the right eye since this morning.  Patient denies any pain in the eye, headache, and no changes in vision.  He notes that he has been diagnosed with staph infection in the eye before that responded well to moxifloxacin.  He has no additional complaints at this time    The history is provided by the patient.         Review of External Medical Records:     Nursing Notes were reviewed.    REVIEW OF SYSTEMS    (2-9 systems for level 4, 10 or more for level 5)     Review of Systems   Constitutional: Negative.    HENT: Negative.     Eyes:  Positive for discharge.   Respiratory: Negative.     Cardiovascular: Negative.    Gastrointestinal: Negative.    Genitourinary: Negative.    Musculoskeletal: Negative.    Skin: Negative.    Neurological: Negative.    Psychiatric/Behavioral: Negative.         Except as noted above the remainder of the review of systems was reviewed and negative.       PAST MEDICAL HISTORY     Past Medical History:   Diagnosis Date    Arthritis     knee, ankle    CAD (coronary artery disease)     CHF (congestive heart failure) (HCC)     Chronic kidney disease     Chronic obstructive pulmonary disease (HCC)     denies    GERD (gastroesophageal reflux disease)     Heart failure (HCC)     History of pelvic fracture 2024    Hypertension     Ill-defined condition     glaucoma   Frequency of Communication with Friends and Family: More than three times a week     Frequency of Social Gatherings with Friends and Family: More than three times a week     Attends Jainism Services: 1 to 4 times per year     Active Member of Clubs or Organizations: No     Attends Club or Organization Meetings: Never     Marital Status:    Intimate Partner Violence: Unknown (8/7/2024)    [facility]    Humiliation, Afraid, Rape, and Kick questionnaire     Physically Abused: No   Housing Stability: Low Risk  (3/20/2025)    Housing Stability Vital Sign     Unable to Pay for Housing in the Last Year: No     Number of Times Moved in the Last Year: 0     Homeless in the Last Year: No           PHYSICAL EXAM    (up to 7 for level 4, 8 or more for level 5)     ED Triage Vitals [03/29/25 1909]   BP Systolic BP Percentile Diastolic BP Percentile Temp Temp Source Pulse Respirations SpO2   110/84 -- -- 97.9 °F (36.6 °C) Tympanic 73 18 98 %      Height Weight         -- --             There is no height or weight on file to calculate BMI.    Physical Exam  Vitals and nursing note reviewed.   Constitutional:       General: He is not in acute distress.     Appearance: Normal appearance. He is not ill-appearing.   HENT:      Head: Normocephalic and atraumatic.      Nose: Nose normal.      Mouth/Throat:      Mouth: Mucous membranes are moist.   Eyes:      Pupils: Pupils are equal, round, and reactive to light.      Comments: Cornea of the right eye appears cloudy and there is complete blindness in the right eye on exam, reportedly both at baseline.  Scant clear discharge also present in the right eye   Cardiovascular:      Rate and Rhythm: Normal rate and regular rhythm.      Pulses: Normal pulses.   Pulmonary:      Effort: Pulmonary effort is normal.      Breath sounds: Normal breath sounds.   Abdominal:      General: There is no distension.      Palpations: Abdomen is

## 2025-03-29 NOTE — TELEPHONE ENCOUNTER
ON CALL NOTE:  Received page regarding concern for conjunctivitis and a request for antibiotic drops. Advised patient that I will not prescribe antibiotics over the phone without an in-person evaluation and recommended going to Urgent Care for an exam and treatment if indicated.    Red flags were reviewed & discussed with the patient to notify me or go to the ED.  He verbalized understanding.

## 2025-03-29 NOTE — DISCHARGE INSTRUCTIONS
You were seen in the emergency department for drainage from the right eye, which is likely caused by a recurrent conjunctivitis.  Please take any medications prescribed at this visit as instructed.  Please follow-up with your PCP and ophthalmologist or return to the emergency department if you experience a worsening of symptoms or any new symptoms that are concerning to you.

## 2025-03-30 LAB
BACTERIA SPEC CULT: NORMAL
GRAM STN SPEC: NORMAL
GRAM STN SPEC: NORMAL
SERVICE CMNT-IMP: NORMAL

## 2025-03-31 ENCOUNTER — HOSPITAL ENCOUNTER (OUTPATIENT)
Facility: HOSPITAL | Age: 89
Setting detail: RECURRING SERIES
Discharge: HOME | End: 2025-04-03
Attending: INTERNAL MEDICINE
Payer: MEDICARE

## 2025-03-31 VITALS — WEIGHT: 145.6 LBS | BODY MASS INDEX: 22.59 KG/M2

## 2025-03-31 PROCEDURE — 93798 PHYS/QHP OP CAR RHAB W/ECG: CPT

## 2025-03-31 ASSESSMENT — EXERCISE STRESS TEST: PEAK_METS: 12

## 2025-04-01 LAB
BACTERIA SPEC CULT: ABNORMAL
GRAM STN SPEC: ABNORMAL
GRAM STN SPEC: ABNORMAL
SERVICE CMNT-IMP: ABNORMAL

## 2025-04-02 ENCOUNTER — APPOINTMENT (OUTPATIENT)
Facility: HOSPITAL | Age: 89
End: 2025-04-02
Attending: INTERNAL MEDICINE
Payer: MEDICARE

## 2025-04-04 ENCOUNTER — TELEPHONE (OUTPATIENT)
Age: 89
End: 2025-04-04

## 2025-04-04 NOTE — TELEPHONE ENCOUNTER
Reason for call:  Spoke with pt's wife. She would like to inform PCP pt has being treated for gout.    Is this a new problem: Yes    Date of last appointment:  3/20/2025     Can we respond via HipWayt: No    Best call back number: Alysa Calles   [phone]

## 2025-04-07 ENCOUNTER — APPOINTMENT (OUTPATIENT)
Facility: HOSPITAL | Age: 89
End: 2025-04-07
Attending: INTERNAL MEDICINE
Payer: MEDICARE

## 2025-04-11 ENCOUNTER — HOSPITAL ENCOUNTER (OUTPATIENT)
Facility: HOSPITAL | Age: 89
Setting detail: RECURRING SERIES
Discharge: HOME | End: 2025-04-14
Attending: INTERNAL MEDICINE
Payer: MEDICARE

## 2025-04-11 VITALS — WEIGHT: 145.3 LBS | BODY MASS INDEX: 22.54 KG/M2

## 2025-04-11 PROCEDURE — 93798 PHYS/QHP OP CAR RHAB W/ECG: CPT

## 2025-04-11 ASSESSMENT — EXERCISE STRESS TEST: PEAK_METS: 12

## 2025-04-14 ENCOUNTER — HOSPITAL ENCOUNTER (OUTPATIENT)
Facility: HOSPITAL | Age: 89
Setting detail: RECURRING SERIES
Discharge: HOME | End: 2025-04-17
Attending: INTERNAL MEDICINE
Payer: MEDICARE

## 2025-04-14 VITALS — WEIGHT: 146 LBS | BODY MASS INDEX: 22.65 KG/M2

## 2025-04-14 PROCEDURE — 93798 PHYS/QHP OP CAR RHAB W/ECG: CPT

## 2025-04-14 ASSESSMENT — EXERCISE STRESS TEST: PEAK_METS: 12

## 2025-04-16 ENCOUNTER — TELEPHONE (OUTPATIENT)
Age: 89
End: 2025-04-16

## 2025-04-17 RX ORDER — ALBUTEROL SULFATE 90 UG/1
2 INHALANT RESPIRATORY (INHALATION) EVERY 6 HOURS PRN
Qty: 18 G | Refills: 0 | Status: SHIPPED | OUTPATIENT
Start: 2025-04-17

## 2025-04-17 NOTE — TELEPHONE ENCOUNTER
Patient would like to speak to a nurse regarding his allergies/wheezing.  He would like to be prescribed an inhaler.  Advised patient that he would need to schedule an appointment to be evaluated, but he declined.    Vinicius Calles - [phone]  
Returned patient call.  Patient stated he is not in any respiratory distress.  He states he has a history of asthma and would like to have a rescue inhaler on hand.  Patient stated he does not need an appointment.  Patient notified will send request to provider.  
Cigarettes

## 2025-04-21 ENCOUNTER — HOSPITAL ENCOUNTER (OUTPATIENT)
Facility: HOSPITAL | Age: 89
Setting detail: RECURRING SERIES
Discharge: HOME | End: 2025-04-24
Attending: INTERNAL MEDICINE
Payer: MEDICARE

## 2025-04-21 VITALS — BODY MASS INDEX: 22.65 KG/M2 | WEIGHT: 146 LBS

## 2025-04-21 PROCEDURE — 93798 PHYS/QHP OP CAR RHAB W/ECG: CPT

## 2025-04-21 ASSESSMENT — PATIENT HEALTH QUESTIONNAIRE - PHQ9
6. FEELING BAD ABOUT YOURSELF - OR THAT YOU ARE A FAILURE OR HAVE LET YOURSELF OR YOUR FAMILY DOWN: NOT AT ALL
SUM OF ALL RESPONSES TO PHQ QUESTIONS 1-9: 1
SUM OF ALL RESPONSES TO PHQ QUESTIONS 1-9: 1
4. FEELING TIRED OR HAVING LITTLE ENERGY: NOT AT ALL
7. TROUBLE CONCENTRATING ON THINGS, SUCH AS READING THE NEWSPAPER OR WATCHING TELEVISION: NOT AT ALL
8. MOVING OR SPEAKING SO SLOWLY THAT OTHER PEOPLE COULD HAVE NOTICED. OR THE OPPOSITE, BEING SO FIGETY OR RESTLESS THAT YOU HAVE BEEN MOVING AROUND A LOT MORE THAN USUAL: NOT AT ALL
SUM OF ALL RESPONSES TO PHQ QUESTIONS 1-9: 1
9. THOUGHTS THAT YOU WOULD BE BETTER OFF DEAD, OR OF HURTING YOURSELF: NOT AT ALL
2. FEELING DOWN, DEPRESSED OR HOPELESS: NOT AT ALL
SUM OF ALL RESPONSES TO PHQ QUESTIONS 1-9: 1
5. POOR APPETITE OR OVEREATING: NOT AT ALL
10. IF YOU CHECKED OFF ANY PROBLEMS, HOW DIFFICULT HAVE THESE PROBLEMS MADE IT FOR YOU TO DO YOUR WORK, TAKE CARE OF THINGS AT HOME, OR GET ALONG WITH OTHER PEOPLE: SOMEWHAT DIFFICULT
3. TROUBLE FALLING OR STAYING ASLEEP: SEVERAL DAYS
1. LITTLE INTEREST OR PLEASURE IN DOING THINGS: NOT AT ALL

## 2025-04-21 ASSESSMENT — EXERCISE STRESS TEST: PEAK_METS: 12

## 2025-04-25 ENCOUNTER — HOSPITAL ENCOUNTER (OUTPATIENT)
Facility: HOSPITAL | Age: 89
Setting detail: RECURRING SERIES
Discharge: HOME | End: 2025-04-28
Attending: INTERNAL MEDICINE
Payer: MEDICARE

## 2025-04-25 VITALS — WEIGHT: 149 LBS | BODY MASS INDEX: 23.11 KG/M2

## 2025-04-25 PROCEDURE — 93798 PHYS/QHP OP CAR RHAB W/ECG: CPT

## 2025-04-25 ASSESSMENT — EXERCISE STRESS TEST
PEAK_HR: 86
PEAK_METS: 2.6
PEAK_BP: 130/68
PEAK_RPE: 12

## 2025-04-25 NOTE — CARDIO/PULMONARY
DISCHARGE SUMMARY NOTE  2025      NAME: Vinicius Calles : 1932 AGE: 89 y.o.  GENDER: male    CARDIAC REHAB ADMITTING DIAGNOSIS: Personal history of surgery to heart and great vessels, presenting hazards to health [Z98.890]    REFERRING PHYSICIAN: Larry Reed MD    MEDICAL HX:  Past Medical History:   Diagnosis Date    Arthritis     knee, ankle    CAD (coronary artery disease)     CHF (congestive heart failure) (HCC)     Chronic kidney disease     Chronic obstructive pulmonary disease (HCC)     denies    GERD (gastroesophageal reflux disease)     Heart failure (HCC)     History of pelvic fracture     Hypertension     Ill-defined condition     glaucoma       LABS:     No results found for: \"HBA1C\", \"[id number]\"  Lab Results   Component Value Date/Time    CHOL 109 2021 01:31 AM    HDL 62 2021 01:31 AM    LDL 34 2021 01:31 AM    VLDL 13 2021 01:31 AM         ANTHROPOMETRICS:      Ht Readings from Last 1 Encounters:   25 1.71 m (5' 7.32\")      Wt Readings from Last 1 Encounters:   25 67.6 kg (149 lb)        WAIST: 36         PROGRAM SUMMARY:    Vinicius Calles completed 16 sessions in phase II of the Cardiac Rehab program. Patient walked 0.11 mi at a speed of   and grade of   for a final MET level of 2.6. Vinicius Calles plans to continue exercising at home and/or a gym and has set revised goals that include 30 minutes of moderate-intensity aerobic activity 3-5 days weekly.      MET level increased from 1.6 at intake to 2.6 at discharge. Vinicius Calles will focus on diet and nutrition at home and has received individualized healthy nutrition recommendations from [facility] Cardiac Rehab staff.  They are aware of their cardiac disease risk factors and cardiac medications. All questions were addressed and discharge plans discussed. Vinicius Calles verbalized understanding.      Brittany Lopez RN  2025

## 2025-05-02 ENCOUNTER — TELEPHONE (OUTPATIENT)
Age: 89
End: 2025-05-02

## 2025-05-02 NOTE — TELEPHONE ENCOUNTER
Patient's wife, Alysa, called.  She would like to speak to Dr. Daniel regarding her 's worsening dementia symptoms.  He is becoming more difficult to handle, mean and nasty.  He is forgetful, losing things and urinating on himself.    Alysa would like to discuss medication or assisted living with Dr. Glass, but she cannot talk in front of the patient because he would become very angry.    Alysa Stevan  [phone]

## 2025-05-05 RX ORDER — DONEPEZIL HYDROCHLORIDE 5 MG/1
5 TABLET, FILM COATED ORAL NIGHTLY
Qty: 30 TABLET | Refills: 3 | Status: SHIPPED | OUTPATIENT
Start: 2025-05-05

## 2025-05-05 NOTE — TELEPHONE ENCOUNTER
5/5/2025 spoke with Mrs. Calles,    Mr. Calles is having noted decline in his memory.  He scored only 17/30 on his Mini mental status exam on 3/20/2025.   I have recommended use of aricept for his dementia, but was declined previously.  They have reconsidered and would like to start this medication.         Plan:  will start aricept 5mg nightly.  Will consider titrate up at his visit in July, 2025.     Orders Placed This Encounter    donepezil (ARICEPT) 5 MG tablet     Sig: Take 1 tablet by mouth nightly     Dispense:  30 tablet     Refill:  3

## 2025-05-13 ENCOUNTER — APPOINTMENT (OUTPATIENT)
Facility: HOSPITAL | Age: 89
End: 2025-05-13
Payer: MEDICARE

## 2025-05-13 ENCOUNTER — HOSPITAL ENCOUNTER (EMERGENCY)
Facility: HOSPITAL | Age: 89
Discharge: HOME | End: 2025-05-13
Attending: EMERGENCY MEDICINE
Payer: MEDICARE

## 2025-05-13 VITALS
HEIGHT: 71 IN | SYSTOLIC BLOOD PRESSURE: 140 MMHG | HEART RATE: 72 BPM | OXYGEN SATURATION: 93 % | TEMPERATURE: 97.3 F | BODY MASS INDEX: 20.59 KG/M2 | WEIGHT: 147.05 LBS | RESPIRATION RATE: 18 BRPM | DIASTOLIC BLOOD PRESSURE: 82 MMHG

## 2025-05-13 DIAGNOSIS — I50.22 CHRONIC SYSTOLIC CONGESTIVE HEART FAILURE (HCC): Primary | ICD-10-CM

## 2025-05-13 LAB
ALBUMIN SERPL-MCNC: 3.8 G/DL (ref 3.5–5)
ALBUMIN/GLOB SERPL: 0.9 (ref 1.1–2.2)
ALP SERPL-CCNC: 137 U/L (ref 45–117)
ALT SERPL-CCNC: 25 U/L (ref 12–78)
ANION GAP SERPL CALC-SCNC: 5 MMOL/L (ref 2–12)
AST SERPL-CCNC: 26 U/L (ref 15–37)
BASOPHILS # BLD: 0.08 K/UL (ref 0–0.1)
BASOPHILS NFR BLD: 0.9 % (ref 0–1)
BILIRUB SERPL-MCNC: 0.8 MG/DL (ref 0.2–1)
BUN SERPL-MCNC: 72 MG/DL (ref 6–20)
BUN/CREAT SERPL: 36 (ref 12–20)
CALCIUM SERPL-MCNC: 9.9 MG/DL (ref 8.5–10.1)
CHLORIDE SERPL-SCNC: 99 MMOL/L (ref 97–108)
CO2 SERPL-SCNC: 31 MMOL/L (ref 21–32)
COMMENT:: NORMAL
CREAT SERPL-MCNC: 1.99 MG/DL (ref 0.7–1.3)
DIFFERENTIAL METHOD BLD: ABNORMAL
EOSINOPHIL # BLD: 0.17 K/UL (ref 0–0.4)
EOSINOPHIL NFR BLD: 1.9 % (ref 0–7)
ERYTHROCYTE [DISTWIDTH] IN BLOOD BY AUTOMATED COUNT: 17.9 % (ref 11.5–14.5)
GLOBULIN SER CALC-MCNC: 4.4 G/DL (ref 2–4)
GLUCOSE SERPL-MCNC: 112 MG/DL (ref 65–100)
HCT VFR BLD AUTO: 37.2 % (ref 36.6–50.3)
HGB BLD-MCNC: 12.1 G/DL (ref 12.1–17)
IMM GRANULOCYTES # BLD AUTO: 0.08 K/UL (ref 0–0.04)
IMM GRANULOCYTES NFR BLD AUTO: 0.9 % (ref 0–0.5)
LYMPHOCYTES # BLD: 2.32 K/UL (ref 0.8–3.5)
LYMPHOCYTES NFR BLD: 25.8 % (ref 12–49)
MCH RBC QN AUTO: 34.4 PG (ref 26–34)
MCHC RBC AUTO-ENTMCNC: 32.5 G/DL (ref 30–36.5)
MCV RBC AUTO: 105.7 FL (ref 80–99)
MONOCYTES # BLD: 1.5 K/UL (ref 0–1)
MONOCYTES NFR BLD: 16.7 % (ref 5–13)
NEUTS SEG # BLD: 4.85 K/UL (ref 1.8–8)
NEUTS SEG NFR BLD: 53.8 % (ref 32–75)
NRBC # BLD: 0.04 K/UL (ref 0–0.01)
NRBC BLD-RTO: 0.4 PER 100 WBC
NT PRO BNP: 4757 PG/ML
PLATELET # BLD AUTO: 165 K/UL (ref 150–400)
PMV BLD AUTO: 12.1 FL (ref 8.9–12.9)
POTASSIUM SERPL-SCNC: 4.3 MMOL/L (ref 3.5–5.1)
PROT SERPL-MCNC: 8.2 G/DL (ref 6.4–8.2)
RBC # BLD AUTO: 3.52 M/UL (ref 4.1–5.7)
SODIUM SERPL-SCNC: 135 MMOL/L (ref 136–145)
SPECIMEN HOLD: NORMAL
TROPONIN I SERPL HS-MCNC: 75 NG/L (ref 0–76)
WBC # BLD AUTO: 9 K/UL (ref 4.1–11.1)

## 2025-05-13 PROCEDURE — 80053 COMPREHEN METABOLIC PANEL: CPT

## 2025-05-13 PROCEDURE — 83880 ASSAY OF NATRIURETIC PEPTIDE: CPT

## 2025-05-13 PROCEDURE — 93005 ELECTROCARDIOGRAM TRACING: CPT | Performed by: PHYSICIAN ASSISTANT

## 2025-05-13 PROCEDURE — 84484 ASSAY OF TROPONIN QUANT: CPT

## 2025-05-13 PROCEDURE — 96374 THER/PROPH/DIAG INJ IV PUSH: CPT

## 2025-05-13 PROCEDURE — 85025 COMPLETE CBC W/AUTO DIFF WBC: CPT

## 2025-05-13 PROCEDURE — 6360000002 HC RX W HCPCS: Performed by: INTERNAL MEDICINE

## 2025-05-13 PROCEDURE — 99285 EMERGENCY DEPT VISIT HI MDM: CPT

## 2025-05-13 PROCEDURE — 71046 X-RAY EXAM CHEST 2 VIEWS: CPT

## 2025-05-13 RX ORDER — BUMETANIDE 0.25 MG/ML
4 INJECTION, SOLUTION INTRAMUSCULAR; INTRAVENOUS ONCE
Status: DISCONTINUED | OUTPATIENT
Start: 2025-05-13 | End: 2025-05-13

## 2025-05-13 RX ORDER — BUMETANIDE 0.25 MG/ML
4 INJECTION, SOLUTION INTRAMUSCULAR; INTRAVENOUS ONCE
Status: COMPLETED | OUTPATIENT
Start: 2025-05-13 | End: 2025-05-13

## 2025-05-13 RX ADMIN — BUMETANIDE 4 MG: 0.25 INJECTION INTRAMUSCULAR; INTRAVENOUS at 11:42

## 2025-05-13 ASSESSMENT — ENCOUNTER SYMPTOMS
SORE THROAT: 0
CHEST TIGHTNESS: 0
SHORTNESS OF BREATH: 1
COLOR CHANGE: 0
NAUSEA: 0
VOMITING: 0
BLOOD IN STOOL: 0
DIARRHEA: 0
EYES NEGATIVE: 1
RHINORRHEA: 0
ABDOMINAL PAIN: 0
WHEEZING: 0
COUGH: 0
SINUS PAIN: 0
STRIDOR: 0
BACK PAIN: 0
TROUBLE SWALLOWING: 0
SINUS PRESSURE: 0

## 2025-05-13 ASSESSMENT — PAIN - FUNCTIONAL ASSESSMENT: PAIN_FUNCTIONAL_ASSESSMENT: NONE - DENIES PAIN

## 2025-05-13 NOTE — ED TRIAGE NOTES
TRIAGE NOTE:  Patient arrives from home with increased leg swelling and weight gain.  Patient has a history of CHF, arrives with wife.      Rituxan Pregnancy And Lactation Text: This medication is Pregnancy Category C and it isn't know if it is safe during pregnancy. It is unknown if this medication is excreted in breast milk but similar antibodies are known to be excreted.

## 2025-05-13 NOTE — CONSULTS
CARDIOLOGY CONSULT                  Subjective:    Date of  Admission:   Admission type:Emergency    Anaya Glass MD     This is a 89 yom with CHF, AF who presented with SOB. He noted some SOB 2 days ago and then had orthopnea overnight prompting his visit. He has noticed some worsening MARTINA. ED workup showed an elevated BNP but within range of his previous values.    Patient Active Problem List   Diagnosis    Diverticulosis of sigmoid colon    Grade IV hemorrhoids    Chronic diastolic congestive heart failure (HCC)    AV block    Nonrheumatic tricuspid valve regurgitation    History of pulmonary embolus (PE)    Longstanding persistent atrial fibrillation (HCC)    Coronary artery disease involving native coronary artery of native heart without angina pectoris    PAD (peripheral artery disease)    S/P colonoscopic polypectomy    Heart failure (HCC)        Past Medical History:   Diagnosis Date    Arthritis     knee, ankle    CAD (coronary artery disease)     CHF (congestive heart failure) (HCC)     Chronic kidney disease     Chronic obstructive pulmonary disease (HCC)     denies    GERD (gastroesophageal reflux disease)     Heart failure (HCC)     History of pelvic fracture 2024    Hypertension     Ill-defined condition     glaucoma      Past Surgical History:   Procedure Laterality Date    APPENDECTOMY      CARDIAC PROCEDURE N/A 1/19/2024    Left heart cath / coronary angiography performed by Alirio Hernández MD at [facility] CARDIAC CATH LAB    COLONOSCOPY N/A 1/9/2017    COLONOSCOPY,HEMORRHOIDECTOMY   performed by Vinicius Arita MD at [facility] MAIN OR    GI      HEENT Bilateral     cataracts    HEENT Right     iridectomy    PACEMAKER INSERTION      August 2023    PROSTATECTOMY      pt denies    VASCULAR SURGERY Bilateral 2012    fem-pop      Family History   Problem Relation Age of Onset    Heart Disease Father       Social History     Socioeconomic History    Marital status:      Spouse name: Not on

## 2025-05-13 NOTE — ED PROVIDER NOTES
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  He also has stage IV renal disease and dementia.  According to the wife, over the last couple of weeks his dementia has gotten much worse.  He does seem to have increasing shortness of breath and told her this morning he was having a bout of heart failure and needed to come to the hospital.  He has not had any fever or chill, nausea or vomiting and has not had any cough or congestion.  He does not have any abdominal pain or other GI or  symptoms.  Patient has been taking his diuretics but they do not seem to be working quite as well as they should.  He does not have particular shortness of breath when he is walking around on.  The wife thinks he dies.  He is not clear whether or not he can lay flat without getting short winded.  He denies any other acute symptomatology.  The wife said that he is a little unsteady on his feet and was having to hold onto her shoulder coming down the steps this morning.  She was afraid he might fall.            Review of External Medical Records:     Nursing Notes were reviewed.    REVIEW OF SYSTEMS       Review of Systems   Constitutional:  Negative for activity change, chills, fatigue and fever.   HENT:  Negative for congestion, ear pain, rhinorrhea, sinus pressure, sinus pain, sore throat and trouble swallowing.    Eyes: Negative.    Respiratory:  Positive for shortness of breath. Negative for cough, chest tightness, wheezing and stridor.    Cardiovascular:  Negative for chest pain, palpitations and leg swelling.

## 2025-05-13 NOTE — ED NOTES
9:42 AM  I have evaluated the patient as the Provider in Rapid Medical Evaluation (RME). I have reviewed his vital signs and the triage nurse assessment. I have talked with the patient and any available family and advised that I am the provider in triage and have ordered the appropriate study to initiate their work up based on the clinical presentation during my assessment. I have advised that the patient will be accommodated in the Main ED as soon as possible. I have also requested to contact the triage nurse or myself immediately if the patient experiences any changes in their condition during this brief waiting period.    Hx CHF.  Cardiologist - Dr. Hernández.  Worsening pedal edema, SOB.      DARÍO Douglas Lindsay H, PA  05/13/25 1396

## 2025-05-13 NOTE — DISCHARGE INSTRUCTIONS
Will need to follow-up with Dr. Hernández later this week.  Call him for an appointment for recommendations on medication adjustments.  He is aware of what is going on today.  Return to the ED if any worsening of symptoms.

## 2025-05-13 NOTE — ED PROVIDER NOTES
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  He also has stage IV renal disease and dementia.  According to the wife, over the last couple of weeks his dementia has gotten much worse.  He does seem to have increasing shortness of breath and told her this morning he was having a bout of heart failure and needed to come to the hospital.  He has not had any fever or chill, nausea or vomiting and has not had any cough or congestion.  He does not have any abdominal pain or other GI or  symptoms.  Patient has been taking his diuretics but they do not seem to be working quite as well as they should.  He does not have particular shortness of breath when he is walking around on.  The wife thinks he dies.  He is not clear whether or not he can lay flat without getting short winded.  He denies any other acute symptomatology.  The wife said that he is a little unsteady on his feet and was having to hold onto her shoulder coming down the steps this morning.  She was afraid he might fall.            Review of External Medical Records:     Nursing Notes were reviewed.    REVIEW OF SYSTEMS       Review of Systems   Constitutional:  Negative for activity change, chills, fatigue and fever.   HENT:  Negative for congestion, ear pain, rhinorrhea, sinus pressure, sinus pain, sore throat and trouble swallowing.    Eyes: Negative.    Respiratory:  Positive for shortness of breath. Negative for cough, chest tightness, wheezing and stridor.    Cardiovascular:  Negative for chest pain, palpitations and leg swelling.

## 2025-05-16 LAB
EKG ATRIAL RATE: 60 BPM
EKG DIAGNOSIS: NORMAL
EKG Q-T INTERVAL: 486 MS
EKG QRS DURATION: 164 MS
EKG QTC CALCULATION (BAZETT): 535 MS
EKG R AXIS: -80 DEGREES
EKG T AXIS: 91 DEGREES
EKG VENTRICULAR RATE: 73 BPM

## 2025-05-16 PROCEDURE — 93010 ELECTROCARDIOGRAM REPORT: CPT | Performed by: SPECIALIST

## 2025-05-27 ASSESSMENT — ENCOUNTER SYMPTOMS
CHEST TIGHTNESS: 0
SHORTNESS OF BREATH: 1
EYES NEGATIVE: 1
BACK PAIN: 0
ABDOMINAL PAIN: 0
SINUS PRESSURE: 0
COUGH: 0
SORE THROAT: 0
COLOR CHANGE: 0
RHINORRHEA: 0
VOMITING: 0
TROUBLE SWALLOWING: 0
BLOOD IN STOOL: 0
WHEEZING: 0
STRIDOR: 0
SINUS PAIN: 0
DIARRHEA: 0
NAUSEA: 0

## 2025-06-22 NOTE — PROGRESS NOTES
Vinicius Calles is a 89 y.o. male Established patient who presents today for evaluation of the following -         Assessment and Plan:    1. Chronic diastolic congestive heart failure (HCC)  -managed with Dr. TRAY Hernández  -taking bumex 4mg bid.  He is taking his second dose of bumex which causes significant urination in bed.  Recommended moving his second dose to around 2pm.  He takes his morning dose between 7am and 9am.    2. Memory impairment  -his mini mental testing done on 3/20/2025 was 17/30  -started on aricept 5mg nightly on 2025. Tolerating it well.  Will increase dose to 10mg nightly as his memory impairment has been progressive.   -wife reports patient now needing to wear adult diapers for incontinence of urine and stool.  -wife has helpers twice weekly from morning until 2pm.  They noting increased aggressiveness starting around early afternoon.  Will give trial of risperidone 1mg daily at 2pm.     3. Depression with anxiety  -tried use of citalopram and duloxetine, but patient stopped both due to side effects.  Declined further treatment.     4. PAF (paroxysmal atrial fibrillation) ([facility])  -anticoagulated on eliquis 2.5mg daily.     5. Chronic renal impairment, stage 4 (severe) ([facility])  -following with Dr. Carney, nephrology.   -has follow up appointment next week.   -on chronic use of diuretic and has heart failure.     6. Encounter for long-term (current) use of medications    7. Bph  -taking proscar 5mg nightly.     8. Urinary and fecal incontinence due to dementia  -he is also taking diuretic.  Which increases urinary frequency.          Orders Placed This Encounter    ALLOPURINOL PO     Sig: Take by mouth    risperiDONE (RISPERDAL) 1 MG tablet     Si tablet at 2pm     Dispense:  30 tablet     Refill:  1    donepezil (ARICEPT) 10 MG tablet     Sig: Take 1 tablet by mouth nightly     Dispense:  90 tablet     Refill:  1       Return in about 3 months (around 2025) for follow

## 2025-06-23 ENCOUNTER — OFFICE VISIT (OUTPATIENT)
Age: 89
End: 2025-06-23
Payer: MEDICARE

## 2025-06-23 VITALS
OXYGEN SATURATION: 97 % | DIASTOLIC BLOOD PRESSURE: 60 MMHG | TEMPERATURE: 97.5 F | SYSTOLIC BLOOD PRESSURE: 110 MMHG | HEART RATE: 71 BPM | RESPIRATION RATE: 16 BRPM | HEIGHT: 71 IN | BODY MASS INDEX: 20.05 KG/M2 | WEIGHT: 143.2 LBS

## 2025-06-23 DIAGNOSIS — R41.3 MEMORY IMPAIRMENT: ICD-10-CM

## 2025-06-23 DIAGNOSIS — N40.0 BENIGN PROSTATIC HYPERPLASIA, UNSPECIFIED WHETHER LOWER URINARY TRACT SYMPTOMS PRESENT: ICD-10-CM

## 2025-06-23 DIAGNOSIS — Z79.899 ENCOUNTER FOR LONG-TERM (CURRENT) USE OF MEDICATIONS: ICD-10-CM

## 2025-06-23 DIAGNOSIS — F41.8 DEPRESSION WITH ANXIETY: ICD-10-CM

## 2025-06-23 DIAGNOSIS — I50.32 CHRONIC DIASTOLIC CONGESTIVE HEART FAILURE (HCC): Primary | ICD-10-CM

## 2025-06-23 DIAGNOSIS — R39.81 FUNCTIONAL URINARY INCONTINENCE: ICD-10-CM

## 2025-06-23 DIAGNOSIS — I48.0 PAF (PAROXYSMAL ATRIAL FIBRILLATION) (HCC): ICD-10-CM

## 2025-06-23 DIAGNOSIS — N18.4 CHRONIC RENAL IMPAIRMENT, STAGE 4 (SEVERE) (HCC): ICD-10-CM

## 2025-06-23 PROCEDURE — 99215 OFFICE O/P EST HI 40 MIN: CPT | Performed by: INTERNAL MEDICINE

## 2025-06-23 PROCEDURE — 1126F AMNT PAIN NOTED NONE PRSNT: CPT | Performed by: INTERNAL MEDICINE

## 2025-06-23 PROCEDURE — 1123F ACP DISCUSS/DSCN MKR DOCD: CPT | Performed by: INTERNAL MEDICINE

## 2025-06-23 PROCEDURE — 1159F MED LIST DOCD IN RCRD: CPT | Performed by: INTERNAL MEDICINE

## 2025-06-23 RX ORDER — RISPERIDONE 1 MG/1
TABLET ORAL
Qty: 30 TABLET | Refills: 1 | Status: ON HOLD | OUTPATIENT
Start: 2025-06-23

## 2025-06-23 RX ORDER — DONEPEZIL HYDROCHLORIDE 10 MG/1
10 TABLET, FILM COATED ORAL NIGHTLY
Qty: 90 TABLET | Refills: 1 | Status: ON HOLD | OUTPATIENT
Start: 2025-06-23

## 2025-06-23 NOTE — PROGRESS NOTES
Chief Complaint   Patient presents with    Follow-up     eviewed record in preparation for visit and have obtained necessary documentation.    Identified pt with two pt identifiers(name and ).      Health Maintenance Due   Topic    Shingles vaccine (2 of 3)    DTaP/Tdap/Td vaccine (1 - Tdap)    Lipids     COVID-19 Vaccine ( season)         Chief Complaint   Patient presents with    Follow-up        Wt Readings from Last 3 Encounters:   25 65 kg (143 lb 3.2 oz)   25 66.7 kg (147 lb 0.8 oz)   25 67.6 kg (149 lb)     Temp Readings from Last 3 Encounters:   25 97.5 °F (36.4 °C) (Temporal)   25 97.3 °F (36.3 °C)   25 97.9 °F (36.6 °C) (Tympanic)     BP Readings from Last 3 Encounters:   25 110/60   25 (!) 140/82   25 110/84     Pulse Readings from Last 3 Encounters:   25 71   25 72   25 73           Learning Assessment:  :    Failed to redirect to the Timeline version of the REVFS SmartLink.    Depression Screening:  :         No data to display                Fall Risk Assessment:  :    Failed to redirect to the Timeline version of the REVFS SmartLink.    Abuse Screening:  :         No data to display

## 2025-06-28 ENCOUNTER — APPOINTMENT (OUTPATIENT)
Facility: HOSPITAL | Age: 89
DRG: 641 | End: 2025-06-28
Payer: MEDICARE

## 2025-06-28 ENCOUNTER — HOSPITAL ENCOUNTER (INPATIENT)
Facility: HOSPITAL | Age: 89
LOS: 2 days | Discharge: HOME HEALTH | DRG: 641 | End: 2025-06-30
Attending: EMERGENCY MEDICINE | Admitting: HOSPITALIST
Payer: MEDICARE

## 2025-06-28 DIAGNOSIS — N18.4 STAGE 4 CHRONIC KIDNEY DISEASE (HCC): ICD-10-CM

## 2025-06-28 DIAGNOSIS — I50.32 CHRONIC DIASTOLIC HEART FAILURE (HCC): ICD-10-CM

## 2025-06-28 DIAGNOSIS — R26.2 UNABLE TO WALK: Primary | ICD-10-CM

## 2025-06-28 PROBLEM — R53.1 WEAKNESS: Status: ACTIVE | Noted: 2025-06-28

## 2025-06-28 LAB
ALBUMIN SERPL-MCNC: 3.5 G/DL (ref 3.5–5)
ALBUMIN/GLOB SERPL: 0.9 (ref 1.1–2.2)
ALP SERPL-CCNC: 148 U/L (ref 45–117)
ALT SERPL-CCNC: 73 U/L (ref 12–78)
ANION GAP SERPL CALC-SCNC: 5 MMOL/L (ref 2–12)
APPEARANCE UR: CLEAR
AST SERPL-CCNC: 45 U/L (ref 15–37)
BASOPHILS # BLD: 0.05 K/UL (ref 0–0.1)
BASOPHILS NFR BLD: 0.6 % (ref 0–1)
BILIRUB SERPL-MCNC: 1.1 MG/DL (ref 0.2–1)
BILIRUB UR QL: NEGATIVE
BUN SERPL-MCNC: 52 MG/DL (ref 6–20)
BUN/CREAT SERPL: 24 (ref 12–20)
CALCIUM SERPL-MCNC: 9.2 MG/DL (ref 8.5–10.1)
CHLORIDE SERPL-SCNC: 101 MMOL/L (ref 97–108)
CK SERPL-CCNC: 119 U/L (ref 39–308)
CO2 SERPL-SCNC: 30 MMOL/L (ref 21–32)
COLOR UR: NORMAL
COMMENT:: NORMAL
CREAT SERPL-MCNC: 2.19 MG/DL (ref 0.7–1.3)
CRP SERPL-MCNC: 0.34 MG/DL (ref 0–0.3)
DIFFERENTIAL METHOD BLD: ABNORMAL
EOSINOPHIL # BLD: 0.13 K/UL (ref 0–0.4)
EOSINOPHIL NFR BLD: 1.5 % (ref 0–7)
ERYTHROCYTE [DISTWIDTH] IN BLOOD BY AUTOMATED COUNT: 16.8 % (ref 11.5–14.5)
ERYTHROCYTE [SEDIMENTATION RATE] IN BLOOD: 35 MM/HR (ref 0–20)
FOLATE SERPL-MCNC: 19 NG/ML (ref 5–21)
GLOBULIN SER CALC-MCNC: 4.1 G/DL (ref 2–4)
GLUCOSE SERPL-MCNC: 99 MG/DL (ref 65–100)
GLUCOSE UR STRIP.AUTO-MCNC: NEGATIVE MG/DL
HCT VFR BLD AUTO: 36.5 % (ref 36.6–50.3)
HGB BLD-MCNC: 11.5 G/DL (ref 12.1–17)
HGB UR QL STRIP: NEGATIVE
IMM GRANULOCYTES # BLD AUTO: 0.05 K/UL (ref 0–0.04)
IMM GRANULOCYTES NFR BLD AUTO: 0.6 % (ref 0–0.5)
KETONES UR QL STRIP.AUTO: NEGATIVE MG/DL
LEUKOCYTE ESTERASE UR QL STRIP.AUTO: NEGATIVE
LYMPHOCYTES # BLD: 1.92 K/UL (ref 0.8–3.5)
LYMPHOCYTES NFR BLD: 21.8 % (ref 12–49)
MAGNESIUM SERPL-MCNC: 2.3 MG/DL (ref 1.6–2.4)
MCH RBC QN AUTO: 34.1 PG (ref 26–34)
MCHC RBC AUTO-ENTMCNC: 31.5 G/DL (ref 30–36.5)
MCV RBC AUTO: 108.3 FL (ref 80–99)
MONOCYTES # BLD: 0.99 K/UL (ref 0–1)
MONOCYTES NFR BLD: 11.3 % (ref 5–13)
NEUTS SEG # BLD: 5.66 K/UL (ref 1.8–8)
NEUTS SEG NFR BLD: 64.2 % (ref 32–75)
NITRITE UR QL STRIP.AUTO: NEGATIVE
NRBC # BLD: 0 K/UL (ref 0–0.01)
NRBC BLD-RTO: 0 PER 100 WBC
NT PRO BNP: 4911 PG/ML
PH UR STRIP: 6.5 (ref 5–8)
PLATELET # BLD AUTO: 158 K/UL (ref 150–400)
PMV BLD AUTO: 11.4 FL (ref 8.9–12.9)
POTASSIUM SERPL-SCNC: 4.1 MMOL/L (ref 3.5–5.1)
PROT SERPL-MCNC: 7.6 G/DL (ref 6.4–8.2)
PROT UR STRIP-MCNC: NEGATIVE MG/DL
RBC # BLD AUTO: 3.37 M/UL (ref 4.1–5.7)
RBC MORPH BLD: ABNORMAL
RBC MORPH BLD: ABNORMAL
SODIUM SERPL-SCNC: 136 MMOL/L (ref 136–145)
SP GR UR REFRACTOMETRY: 1.01 (ref 1–1.03)
SPECIMEN HOLD: NORMAL
SPECIMEN HOLD: NORMAL
TROPONIN I SERPL HS-MCNC: 70 NG/L (ref 0–76)
UROBILINOGEN UR QL STRIP.AUTO: 0.2 EU/DL (ref 0.2–1)
VIT B12 SERPL-MCNC: 987 PG/ML (ref 193–986)
WBC # BLD AUTO: 8.8 K/UL (ref 4.1–11.1)
WBC MORPH BLD: ABNORMAL

## 2025-06-28 PROCEDURE — 6370000000 HC RX 637 (ALT 250 FOR IP): Performed by: HOSPITALIST

## 2025-06-28 PROCEDURE — 71045 X-RAY EXAM CHEST 1 VIEW: CPT

## 2025-06-28 PROCEDURE — 83880 ASSAY OF NATRIURETIC PEPTIDE: CPT

## 2025-06-28 PROCEDURE — 85652 RBC SED RATE AUTOMATED: CPT

## 2025-06-28 PROCEDURE — 85025 COMPLETE CBC W/AUTO DIFF WBC: CPT

## 2025-06-28 PROCEDURE — 86140 C-REACTIVE PROTEIN: CPT

## 2025-06-28 PROCEDURE — 82550 ASSAY OF CK (CPK): CPT

## 2025-06-28 PROCEDURE — 1100000000 HC RM PRIVATE

## 2025-06-28 PROCEDURE — 82607 VITAMIN B-12: CPT

## 2025-06-28 PROCEDURE — 81002 URINALYSIS NONAUTO W/O SCOPE: CPT

## 2025-06-28 PROCEDURE — 83735 ASSAY OF MAGNESIUM: CPT

## 2025-06-28 PROCEDURE — 84484 ASSAY OF TROPONIN QUANT: CPT

## 2025-06-28 PROCEDURE — 82746 ASSAY OF FOLIC ACID SERUM: CPT

## 2025-06-28 PROCEDURE — 70450 CT HEAD/BRAIN W/O DYE: CPT

## 2025-06-28 PROCEDURE — 2580000003 HC RX 258: Performed by: HOSPITALIST

## 2025-06-28 PROCEDURE — 99285 EMERGENCY DEPT VISIT HI MDM: CPT

## 2025-06-28 PROCEDURE — 93005 ELECTROCARDIOGRAM TRACING: CPT | Performed by: EMERGENCY MEDICINE

## 2025-06-28 PROCEDURE — 80053 COMPREHEN METABOLIC PANEL: CPT

## 2025-06-28 PROCEDURE — 72131 CT LUMBAR SPINE W/O DYE: CPT

## 2025-06-28 RX ORDER — ATORVASTATIN CALCIUM 10 MG/1
10 TABLET, FILM COATED ORAL DAILY
Status: DISCONTINUED | OUTPATIENT
Start: 2025-06-28 | End: 2025-06-30 | Stop reason: HOSPADM

## 2025-06-28 RX ORDER — SODIUM CHLORIDE 0.9 % (FLUSH) 0.9 %
5-40 SYRINGE (ML) INJECTION PRN
Status: DISCONTINUED | OUTPATIENT
Start: 2025-06-28 | End: 2025-06-30 | Stop reason: HOSPADM

## 2025-06-28 RX ORDER — ONDANSETRON 4 MG/1
4 TABLET, ORALLY DISINTEGRATING ORAL EVERY 8 HOURS PRN
Status: DISCONTINUED | OUTPATIENT
Start: 2025-06-28 | End: 2025-06-30 | Stop reason: HOSPADM

## 2025-06-28 RX ORDER — ENOXAPARIN SODIUM 100 MG/ML
30 INJECTION SUBCUTANEOUS DAILY
Status: DISCONTINUED | OUTPATIENT
Start: 2025-06-29 | End: 2025-06-28

## 2025-06-28 RX ORDER — DORZOLAMIDE HCL 20 MG/ML
1 SOLUTION/ DROPS OPHTHALMIC 2 TIMES DAILY
Status: DISCONTINUED | OUTPATIENT
Start: 2025-06-28 | End: 2025-06-30 | Stop reason: HOSPADM

## 2025-06-28 RX ORDER — ONDANSETRON 2 MG/ML
4 INJECTION INTRAMUSCULAR; INTRAVENOUS EVERY 6 HOURS PRN
Status: DISCONTINUED | OUTPATIENT
Start: 2025-06-28 | End: 2025-06-30 | Stop reason: HOSPADM

## 2025-06-28 RX ORDER — ACETAMINOPHEN 650 MG/1
650 SUPPOSITORY RECTAL EVERY 6 HOURS PRN
Status: DISCONTINUED | OUTPATIENT
Start: 2025-06-28 | End: 2025-06-30 | Stop reason: HOSPADM

## 2025-06-28 RX ORDER — POLYETHYLENE GLYCOL 3350 17 G/17G
17 POWDER, FOR SOLUTION ORAL DAILY PRN
Status: DISCONTINUED | OUTPATIENT
Start: 2025-06-28 | End: 2025-06-30 | Stop reason: HOSPADM

## 2025-06-28 RX ORDER — FINASTERIDE 5 MG/1
5 TABLET, FILM COATED ORAL DAILY
Status: DISCONTINUED | OUTPATIENT
Start: 2025-06-28 | End: 2025-06-30 | Stop reason: HOSPADM

## 2025-06-28 RX ORDER — RISPERIDONE 1 MG/1
1 TABLET ORAL DAILY
Status: DISCONTINUED | OUTPATIENT
Start: 2025-06-29 | End: 2025-06-30 | Stop reason: HOSPADM

## 2025-06-28 RX ORDER — EZETIMIBE 10 MG/1
10 TABLET ORAL DAILY
Status: DISCONTINUED | OUTPATIENT
Start: 2025-06-28 | End: 2025-06-30 | Stop reason: HOSPADM

## 2025-06-28 RX ORDER — TRAZODONE HYDROCHLORIDE 50 MG/1
25 TABLET ORAL NIGHTLY
Status: DISCONTINUED | OUTPATIENT
Start: 2025-06-28 | End: 2025-06-30 | Stop reason: HOSPADM

## 2025-06-28 RX ORDER — TIMOLOL MALEATE 5 MG/ML
1 SOLUTION/ DROPS OPHTHALMIC 2 TIMES DAILY
Status: DISCONTINUED | OUTPATIENT
Start: 2025-06-28 | End: 2025-06-30 | Stop reason: HOSPADM

## 2025-06-28 RX ORDER — IPRATROPIUM BROMIDE AND ALBUTEROL SULFATE 2.5; .5 MG/3ML; MG/3ML
1 SOLUTION RESPIRATORY (INHALATION) EVERY 6 HOURS PRN
Status: DISCONTINUED | OUTPATIENT
Start: 2025-06-28 | End: 2025-06-30 | Stop reason: HOSPADM

## 2025-06-28 RX ORDER — ACETAMINOPHEN 325 MG/1
650 TABLET ORAL EVERY 6 HOURS PRN
Status: DISCONTINUED | OUTPATIENT
Start: 2025-06-28 | End: 2025-06-30 | Stop reason: HOSPADM

## 2025-06-28 RX ORDER — ALLOPURINOL 100 MG/1
100 TABLET ORAL 2 TIMES DAILY
Status: DISCONTINUED | OUTPATIENT
Start: 2025-06-28 | End: 2025-06-30 | Stop reason: HOSPADM

## 2025-06-28 RX ORDER — SODIUM CHLORIDE 9 MG/ML
INJECTION, SOLUTION INTRAVENOUS PRN
Status: DISCONTINUED | OUTPATIENT
Start: 2025-06-28 | End: 2025-06-30 | Stop reason: HOSPADM

## 2025-06-28 RX ORDER — DONEPEZIL HYDROCHLORIDE 10 MG/1
10 TABLET, FILM COATED ORAL NIGHTLY
Status: DISCONTINUED | OUTPATIENT
Start: 2025-06-28 | End: 2025-06-30 | Stop reason: HOSPADM

## 2025-06-28 RX ORDER — SODIUM CHLORIDE 0.9 % (FLUSH) 0.9 %
5-40 SYRINGE (ML) INJECTION EVERY 12 HOURS SCHEDULED
Status: DISCONTINUED | OUTPATIENT
Start: 2025-06-28 | End: 2025-06-30 | Stop reason: HOSPADM

## 2025-06-28 RX ORDER — DORZOLAMIDE HYDROCHLORIDE AND TIMOLOL MALEATE 20; 5 MG/ML; MG/ML
1 SOLUTION/ DROPS OPHTHALMIC 2 TIMES DAILY
Status: DISCONTINUED | OUTPATIENT
Start: 2025-06-28 | End: 2025-06-28 | Stop reason: CLARIF

## 2025-06-28 RX ORDER — SODIUM CHLORIDE 9 MG/ML
INJECTION, SOLUTION INTRAVENOUS CONTINUOUS
Status: DISPENSED | OUTPATIENT
Start: 2025-06-28 | End: 2025-06-29

## 2025-06-28 RX ADMIN — EZETIMIBE 10 MG: 10 TABLET ORAL at 20:10

## 2025-06-28 RX ADMIN — ALLOPURINOL 100 MG: 100 TABLET ORAL at 20:10

## 2025-06-28 RX ADMIN — TRAZODONE HYDROCHLORIDE 25 MG: 50 TABLET ORAL at 20:10

## 2025-06-28 RX ADMIN — APIXABAN 2.5 MG: 2.5 TABLET, FILM COATED ORAL at 20:10

## 2025-06-28 RX ADMIN — TIMOLOL MALEATE 1 DROP: 5 SOLUTION/ DROPS OPHTHALMIC at 23:38

## 2025-06-28 RX ADMIN — DORZOLAMIDE HYDROCHLORIDE 1 DROP: 20 SOLUTION/ DROPS OPHTHALMIC at 23:39

## 2025-06-28 RX ADMIN — ATORVASTATIN CALCIUM 10 MG: 10 TABLET, FILM COATED ORAL at 20:10

## 2025-06-28 RX ADMIN — SODIUM CHLORIDE: 0.9 INJECTION, SOLUTION INTRAVENOUS at 20:10

## 2025-06-28 RX ADMIN — DONEPEZIL HYDROCHLORIDE 10 MG: 10 TABLET, FILM COATED ORAL at 20:10

## 2025-06-28 RX ADMIN — FINASTERIDE 5 MG: 5 TABLET, FILM COATED ORAL at 20:10

## 2025-06-28 ASSESSMENT — PAIN SCALES - GENERAL: PAINLEVEL_OUTOF10: 0

## 2025-06-28 NOTE — ED TRIAGE NOTES
Pt arrived via EMS from home with CC generalized weakness that progressed since last night. Difficulty with ambulation this morning. Stroke scale negative per EMS. . Aox3, confusion at baseline        PMH: Dementia, right eye blindness

## 2025-06-28 NOTE — H&P
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  Patient says he just could not walk, his legs feel \"paraplegic\".  He denies fall or back pain, paresthesia, numbness, tingling sensation.  No bowel or bladder incontinence or retention.  He is moving his legs fine while in bed basically just could not walk.  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  He denies nausea, vomiting, fever, chills, diarrhea or urinary complaints.  On initial eval in the ED,  vital signs were stable  Pertinent lab findings include:  -Creatinine 2.19, BUN 52, proBNP 4911, AST 45, T. bili 1.1  -Hemoglobin 9.5, .3  -UA negative-CXR negative    Patient was recently hospitalized from 2/10 - 2/21/2025 for acute on chronic HFpEF, CKD stage III-IV and hypokalemia.                The patient denies any headache, blurry vision, sore throat, trouble swallowing, trouble with speech, chest pain, SOB, cough, fever, chills, N/V/D, abd pain, urinary symptoms, constipation, recent travels, sick contacts, focal or generalized neurological symptoms, falls, injuries, rashes, contact with COVID-19 diagnosed patients, hematemesis, melena, hemoptysis, hematuria, rashes, denies starting any new medications and denies any other concerns or problems besides as mentioned above.         REVIEW OF SYSTEMS:  A comprehensive review of systems was negative except for that written in the History of Present Illness.     Past Medical History:   Diagnosis Date    Arthritis     knee, ankle    CAD (coronary artery disease)     CHF (congestive heart failure) (HCC)     Chronic kidney

## 2025-06-28 NOTE — ED PROVIDER NOTES
BRAIN NATRIURETIC PEPTIDE - Abnormal; Notable for the following components:    NT Pro-BNP 4,911 (*)     All other components within normal limits   URINE CULTURE HOLD SAMPLE   EXTRA TUBES HOLD   URINALYSIS   MAGNESIUM   TROPONIN       CONSULTS:  None    PROCEDURES:     Procedures      FINAL IMPRESSION      1. Unable to walk    2. Chronic diastolic heart failure (HCC)    3. Stage 4 chronic kidney disease (HCC)          DISPOSITION/PLAN   DISPOSITION      Perfect Serve Consult for Admission  3:29 PM    ED Room Number: ER09/09  Patient Name and age:  Vinicius Calles 89 y.o.  male  Working Diagnosis:   1. Unable to walk    2. Chronic diastolic heart failure (HCC)    3. 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 Was more confused, per family, but appears to be at baseline currently. Has 3d/week home care but family has been overwhelmed. Has been diuresing with bumex, not overloaded.     PATIENT REFERRED TO:  No follow-up provider specified.    DISCHARGE MEDICATIONS:  New Prescriptions    No medications on file         (Please note that portions of this note were completed with a transcription program.  Efforts were made to edit the dictations but occasionally words are mis-transcribed.)    Adam Liriano MD (electronically signed)             Adam Liriano MD  06/29/25 0013

## 2025-06-29 LAB
ALBUMIN SERPL-MCNC: 3 G/DL (ref 3.5–5)
ALBUMIN/GLOB SERPL: 0.9 (ref 1.1–2.2)
ALP SERPL-CCNC: 137 U/L (ref 45–117)
ALT SERPL-CCNC: 52 U/L (ref 12–78)
ANION GAP SERPL CALC-SCNC: 6 MMOL/L (ref 2–12)
AST SERPL-CCNC: 31 U/L (ref 15–37)
BASOPHILS # BLD: 0.07 K/UL (ref 0–0.1)
BASOPHILS NFR BLD: 0.8 % (ref 0–1)
BILIRUB SERPL-MCNC: 0.7 MG/DL (ref 0.2–1)
BUN SERPL-MCNC: 56 MG/DL (ref 6–20)
BUN/CREAT SERPL: 29 (ref 12–20)
CALCIUM SERPL-MCNC: 8.4 MG/DL (ref 8.5–10.1)
CHLORIDE SERPL-SCNC: 104 MMOL/L (ref 97–108)
CO2 SERPL-SCNC: 30 MMOL/L (ref 21–32)
CREAT SERPL-MCNC: 1.9 MG/DL (ref 0.7–1.3)
DIFFERENTIAL METHOD BLD: ABNORMAL
EOSINOPHIL # BLD: 0.34 K/UL (ref 0–0.4)
EOSINOPHIL NFR BLD: 3.8 % (ref 0–7)
ERYTHROCYTE [DISTWIDTH] IN BLOOD BY AUTOMATED COUNT: 17 % (ref 11.5–14.5)
GLOBULIN SER CALC-MCNC: 3.2 G/DL (ref 2–4)
GLUCOSE BLD STRIP.AUTO-MCNC: 132 MG/DL (ref 65–117)
GLUCOSE SERPL-MCNC: 107 MG/DL (ref 65–100)
HCT VFR BLD AUTO: 31.4 % (ref 36.6–50.3)
HGB BLD-MCNC: 10.5 G/DL (ref 12.1–17)
IMM GRANULOCYTES # BLD AUTO: 0.07 K/UL (ref 0–0.04)
IMM GRANULOCYTES NFR BLD AUTO: 0.8 % (ref 0–0.5)
LYMPHOCYTES # BLD: 2.35 K/UL (ref 0.8–3.5)
LYMPHOCYTES NFR BLD: 26.4 % (ref 12–49)
MCH RBC QN AUTO: 35.1 PG (ref 26–34)
MCHC RBC AUTO-ENTMCNC: 33.4 G/DL (ref 30–36.5)
MCV RBC AUTO: 105 FL (ref 80–99)
MONOCYTES # BLD: 1.02 K/UL (ref 0–1)
MONOCYTES NFR BLD: 11.4 % (ref 5–13)
NEUTS SEG # BLD: 5.06 K/UL (ref 1.8–8)
NEUTS SEG NFR BLD: 56.8 % (ref 32–75)
NRBC # BLD: 0.03 K/UL (ref 0–0.01)
NRBC BLD-RTO: 0.3 PER 100 WBC
PLATELET # BLD AUTO: 151 K/UL (ref 150–400)
PMV BLD AUTO: 12 FL (ref 8.9–12.9)
POTASSIUM SERPL-SCNC: 3.7 MMOL/L (ref 3.5–5.1)
PROT SERPL-MCNC: 6.2 G/DL (ref 6.4–8.2)
RBC # BLD AUTO: 2.99 M/UL (ref 4.1–5.7)
SERVICE CMNT-IMP: ABNORMAL
SODIUM SERPL-SCNC: 140 MMOL/L (ref 136–145)
WBC # BLD AUTO: 8.9 K/UL (ref 4.1–11.1)

## 2025-06-29 PROCEDURE — 94640 AIRWAY INHALATION TREATMENT: CPT

## 2025-06-29 PROCEDURE — 80053 COMPREHEN METABOLIC PANEL: CPT

## 2025-06-29 PROCEDURE — 2500000003 HC RX 250 WO HCPCS: Performed by: HOSPITALIST

## 2025-06-29 PROCEDURE — 82962 GLUCOSE BLOOD TEST: CPT

## 2025-06-29 PROCEDURE — 97535 SELF CARE MNGMENT TRAINING: CPT

## 2025-06-29 PROCEDURE — 97116 GAIT TRAINING THERAPY: CPT | Performed by: PHYSICAL THERAPIST

## 2025-06-29 PROCEDURE — 85025 COMPLETE CBC W/AUTO DIFF WBC: CPT

## 2025-06-29 PROCEDURE — 1100000000 HC RM PRIVATE

## 2025-06-29 PROCEDURE — 97165 OT EVAL LOW COMPLEX 30 MIN: CPT

## 2025-06-29 PROCEDURE — 6370000000 HC RX 637 (ALT 250 FOR IP): Performed by: HOSPITALIST

## 2025-06-29 PROCEDURE — 94760 N-INVAS EAR/PLS OXIMETRY 1: CPT

## 2025-06-29 PROCEDURE — 6360000002 HC RX W HCPCS: Performed by: HOSPITALIST

## 2025-06-29 PROCEDURE — 97161 PT EVAL LOW COMPLEX 20 MIN: CPT | Performed by: PHYSICAL THERAPIST

## 2025-06-29 RX ADMIN — DORZOLAMIDE HYDROCHLORIDE 1 DROP: 20 SOLUTION/ DROPS OPHTHALMIC at 20:38

## 2025-06-29 RX ADMIN — TIMOLOL MALEATE 1 DROP: 5 SOLUTION/ DROPS OPHTHALMIC at 20:38

## 2025-06-29 RX ADMIN — ALLOPURINOL 100 MG: 100 TABLET ORAL at 08:23

## 2025-06-29 RX ADMIN — ARFORMOTEROL TARTRATE: 15 SOLUTION RESPIRATORY (INHALATION) at 08:46

## 2025-06-29 RX ADMIN — TIMOLOL MALEATE 1 DROP: 5 SOLUTION/ DROPS OPHTHALMIC at 09:04

## 2025-06-29 RX ADMIN — RISPERIDONE 1 MG: 1 TABLET, FILM COATED ORAL at 14:32

## 2025-06-29 RX ADMIN — SODIUM CHLORIDE, PRESERVATIVE FREE 10 ML: 5 INJECTION INTRAVENOUS at 20:35

## 2025-06-29 RX ADMIN — ARFORMOTEROL TARTRATE: 15 SOLUTION RESPIRATORY (INHALATION) at 20:53

## 2025-06-29 RX ADMIN — SODIUM CHLORIDE, PRESERVATIVE FREE 10 ML: 5 INJECTION INTRAVENOUS at 08:24

## 2025-06-29 RX ADMIN — TRAZODONE HYDROCHLORIDE 25 MG: 50 TABLET ORAL at 20:34

## 2025-06-29 RX ADMIN — FINASTERIDE 5 MG: 5 TABLET, FILM COATED ORAL at 08:23

## 2025-06-29 RX ADMIN — APIXABAN 2.5 MG: 2.5 TABLET, FILM COATED ORAL at 08:23

## 2025-06-29 RX ADMIN — APIXABAN 2.5 MG: 2.5 TABLET, FILM COATED ORAL at 20:34

## 2025-06-29 RX ADMIN — DONEPEZIL HYDROCHLORIDE 10 MG: 10 TABLET, FILM COATED ORAL at 20:34

## 2025-06-29 RX ADMIN — DORZOLAMIDE HYDROCHLORIDE 1 DROP: 20 SOLUTION/ DROPS OPHTHALMIC at 09:04

## 2025-06-29 RX ADMIN — ATORVASTATIN CALCIUM 10 MG: 10 TABLET, FILM COATED ORAL at 08:23

## 2025-06-29 RX ADMIN — ALLOPURINOL 100 MG: 100 TABLET ORAL at 20:34

## 2025-06-29 RX ADMIN — EZETIMIBE 10 MG: 10 TABLET ORAL at 08:23

## 2025-06-29 NOTE — PROGRESS NOTES
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  Patient stated he just could not walk, his legs feel \"paraplegic\".  He denied fall or back pain, paresthesia, numbness, tingling sensation.  No bowel or bladder incontinence or retention.  He was moving his legs fine while in bed basically just could not walk.  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 He denied nausea, vomiting, fever, chills, diarrhea or urinary complaints.    On initial eval in the ED,  vital signs were stable  Pertinent lab findings include:  -Creatinine 2.19, BUN 52, proBNP 4911, AST 45, T. bili 1.1  -Hemoglobin 9.5, .3  -UA negative-CXR negative     Patient was recently hospitalized from 2/10 - 2025 for acute on chronic HFpEF, CKD stage III-IV and hypokalemia.  Interval history / Subjective:   Patient seen and evaluated on rounds in conjunction with nursing. Patient sitting in the recliner at bedside comfortable. States is pain is well controlled, stated he did not remember working with PT/OT today, however they were the ones to get him to the chair. Discussed CT results with patient, agreeable to spine consult. Patient denies any recent falls but [place]. PT/OT evaluated the patient and recommended home with home health. Will await spine consult. Patient denies CP, SOB, abdominal pain, fevers and chills. No further

## 2025-06-29 NOTE — ED NOTES
ED TO INPATIENT SBAR HANDOFF    Patient Name: Vinicius Calles   :  1932  89 y.o.   MRN:  [id number]  ED Room #:  ER09/09     Situation  Code Status: Full Code   Allergies: Sulfa antibiotics  Weight: No data found.    Arrived from: home    Chief Complaint:   Chief Complaint   Patient presents with    Fatigue       Hospital Problem/Diagnosis:  Principal Problem:    Weakness  Resolved Problems:    * No resolved hospital problems. *      Mobility: new onset weakness  and a recent fall   ED Fall Risk: Presents to emergency department  because of falls (Syncope, seizure, or loss of consciousness): No, Age > 70: Yes, Altered Mental Status, Intoxication with alcohol or substance confusion (Disorientation, impaired judgment, poor safety awaremess, or inability to follow instructions): Yes, Impaired Mobility: Ambulates or transfers with assistive devices or assistance; Unable to ambulate or transer.: Yes, Nursing Judgement: Yes   Fell in ED or prior to admission: no   Restraints: no     Sitter: no   Family/Caregiver Present: no    Neet to know social/safety information:     Background  History:   Past Medical History:   Diagnosis Date    Arthritis     knee, ankle    CAD (coronary artery disease)     CHF (congestive heart failure) ([facility])     Chronic kidney disease     Chronic obstructive pulmonary disease (HCC)     denies    GERD (gastroesophageal reflux disease)     Heart failure ([facility])     History of pelvic fracture     Hypertension     Ill-defined condition     glaucoma       Assessment    Abnormal Assessment Findings:   Imaging:   CT HEAD WO CONTRAST   Final Result   Chronic right cerebellar infarct. No acute intracranial abnormality.            Electronically signed by JESSICA MINA      CT LUMBAR SPINE WO CONTRAST   Final Result      1. Multilevel degenerative change detailed by level above most significant at   L4-5 and L5-S1      2. Osteopenia. No acute fracture         Electronically signed by Tamar Rodriguez

## 2025-06-30 VITALS
WEIGHT: 142.2 LBS | OXYGEN SATURATION: 94 % | DIASTOLIC BLOOD PRESSURE: 81 MMHG | HEIGHT: 71 IN | HEART RATE: 70 BPM | RESPIRATION RATE: 12 BRPM | TEMPERATURE: 98.1 F | BODY MASS INDEX: 19.91 KG/M2 | SYSTOLIC BLOOD PRESSURE: 134 MMHG

## 2025-06-30 LAB
ANION GAP SERPL CALC-SCNC: 5 MMOL/L (ref 2–12)
BUN SERPL-MCNC: 56 MG/DL (ref 6–20)
BUN/CREAT SERPL: 30 (ref 12–20)
CALCIUM SERPL-MCNC: 8.5 MG/DL (ref 8.5–10.1)
CHLORIDE SERPL-SCNC: 106 MMOL/L (ref 97–108)
CO2 SERPL-SCNC: 29 MMOL/L (ref 21–32)
CREAT SERPL-MCNC: 1.87 MG/DL (ref 0.7–1.3)
EKG ATRIAL RATE: 76 BPM
EKG DIAGNOSIS: NORMAL
EKG Q-T INTERVAL: 510 MS
EKG QRS DURATION: 170 MS
EKG QTC CALCULATION (BAZETT): 550 MS
EKG R AXIS: -81 DEGREES
EKG T AXIS: 93 DEGREES
EKG VENTRICULAR RATE: 70 BPM
GLUCOSE SERPL-MCNC: 102 MG/DL (ref 65–100)
POTASSIUM SERPL-SCNC: 3.9 MMOL/L (ref 3.5–5.1)
SODIUM SERPL-SCNC: 140 MMOL/L (ref 136–145)

## 2025-06-30 PROCEDURE — 6360000002 HC RX W HCPCS: Performed by: HOSPITALIST

## 2025-06-30 PROCEDURE — 94640 AIRWAY INHALATION TREATMENT: CPT

## 2025-06-30 PROCEDURE — 97535 SELF CARE MNGMENT TRAINING: CPT

## 2025-06-30 PROCEDURE — 80048 BASIC METABOLIC PNL TOTAL CA: CPT

## 2025-06-30 PROCEDURE — 6370000000 HC RX 637 (ALT 250 FOR IP): Performed by: HOSPITALIST

## 2025-06-30 PROCEDURE — 2500000003 HC RX 250 WO HCPCS: Performed by: HOSPITALIST

## 2025-06-30 PROCEDURE — 97116 GAIT TRAINING THERAPY: CPT

## 2025-06-30 RX ORDER — METOPROLOL SUCCINATE 25 MG/1
12.5 TABLET, EXTENDED RELEASE ORAL DAILY
Qty: 15 TABLET | Refills: 0 | Status: SHIPPED | OUTPATIENT
Start: 2025-06-30 | End: 2025-06-30

## 2025-06-30 RX ORDER — METOPROLOL SUCCINATE 25 MG/1
12.5 TABLET, EXTENDED RELEASE ORAL DAILY
Qty: 15 TABLET | Refills: 0 | Status: SHIPPED | OUTPATIENT
Start: 2025-06-30 | End: 2025-06-30 | Stop reason: HOSPADM

## 2025-06-30 RX ADMIN — ALLOPURINOL 100 MG: 100 TABLET ORAL at 08:54

## 2025-06-30 RX ADMIN — TIMOLOL MALEATE 1 DROP: 5 SOLUTION/ DROPS OPHTHALMIC at 08:54

## 2025-06-30 RX ADMIN — APIXABAN 2.5 MG: 2.5 TABLET, FILM COATED ORAL at 08:53

## 2025-06-30 RX ADMIN — ARFORMOTEROL TARTRATE: 15 SOLUTION RESPIRATORY (INHALATION) at 09:21

## 2025-06-30 RX ADMIN — FINASTERIDE 5 MG: 5 TABLET, FILM COATED ORAL at 08:54

## 2025-06-30 RX ADMIN — ATORVASTATIN CALCIUM 10 MG: 10 TABLET, FILM COATED ORAL at 08:53

## 2025-06-30 RX ADMIN — SODIUM CHLORIDE, PRESERVATIVE FREE 10 ML: 5 INJECTION INTRAVENOUS at 08:55

## 2025-06-30 RX ADMIN — DORZOLAMIDE HYDROCHLORIDE 1 DROP: 20 SOLUTION/ DROPS OPHTHALMIC at 08:54

## 2025-06-30 RX ADMIN — EZETIMIBE 10 MG: 10 TABLET ORAL at 08:54

## 2025-06-30 NOTE — CONSULTS
[place] [id number]                              CONSULTATION      PATIENT NAME: CLAYTON GAMEZ             : 1932  MED REC NO: [id number]                       ROOM: 536  ACCOUNT NO: [id number]                       ADMIT DATE: 2025  PROVIDER: Davi Flores MD    DATE OF SERVICE:  2025    ATTENDING PHYSICIAN:  Blanche Todd MD    CHIEF COMPLAINT:  Fatigue and difficulty walking.    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  He denies any recent falls or injury.    SURGICAL HISTORY:  Appendectomy, cataract surgery, pacemaker insertion, femoral-popliteal bypass.    ALLERGIES:  TO SULFA.    He is on Eliquis, among many other medications that are reviewed including respiratory inhalers.    No alcohol or tobacco use.    PHYSICAL EXAMINATION:  The history and physical admission note, notes that he is awake and alert.  Oriented to person, place, and time.  Blind in the right eye.  Moves all 4 extremities equally without focal deficit.  Gait and station were deferred.  I reviewed the only imaging study, which is a CT scan which shows some scoliosis and obvious multiple levels of degenerative changes and disk disease as a result of the aging process, but nothing for which I would recommend any surgical intervention and particularly in light of the fact that the patient is moving all 4 extremities, I do not think there is going to be any neurosurgical intervention needed.  However, further workup is necessary.  He needs an MRI of the lumbar spine.  I will follow as necessary.    Thank you for asking me to see

## 2025-06-30 NOTE — CARE COORDINATION
Transition of Care Plan:    RUR: 20%   Prior Level of Functioning: Min A   Disposition: Home with Family Assistance and Home Health   Home Health; Accepted   [facility]   Home Health; Declined   At Home Care  [facility]   No Preference in a provider.    BRENDAN: Today   Follow up appointments: PCP  DME needed: None   Transportation at discharge: Spouse   IM/IMM Medicare/ letter given: Received   Caregiver Contact:   MargaretmaryAlysa (Spouse) [phone]  Discharge Caregiver contacted prior to discharge? Y  Care Conference needed? N     06/30/25 1202   Discharge Planning   Patient expects to be discharged to: House   Services At/After Discharge   Transition of Care Consult (CM Consult) Home Health;Discharge Planning   Internal Home Health No   Reason Outside Agency Chosen Unable to staff case   Mode of Transport at Discharge Other (see comment)   Confirm Follow Up Transport Family   Condition of Participation: Discharge Planning   The Plan for Transition of Care is related to the following treatment goals: Home with Home Health and Famly Assistance   The Patient and/or Patient Representative was provided with a Choice of Provider? Patient Representative   Name of the Patient Representative who was provided with the Choice of Provider and agrees with the Discharge Plan?  Alysa Calles (Spouse)  [phone]   The Patient and/Or Patient Representative agree with the Discharge Plan? Yes   Freedom of Choice list was provided with basic dialogue that supports the patient's individualized plan of care/goals, treatment preferences, and shares the quality data associated with the providers?  Yes

## 2025-06-30 NOTE — PROGRESS NOTES
PHYSICAL THERAPY TREATMENT    Patient: Vinicius Calles (89 y.o. male)  Date: 6/30/2025  Diagnosis: Chronic diastolic heart failure (HCC) [I50.32]  Weakness [R53.1]  Unable to walk [R26.2]  Stage 4 chronic kidney disease (HCC) [N18.4] Weakness      Precautions: Restrictions/Precautions  Restrictions/Precautions: Fall Risk, Bed Alarm            ASSESSMENT:  Patient continues to benefit from skilled PT services and is slowly progressing towards goals.    ***STS multiple times, gait with occasional piloting cues, upright cues, lightly confused           PLAN:  Patient continues to benefit from skilled intervention to address the above impairments.  Continue treatment per established plan of care.    {for staff and next therapy session (Optional):50960}    Recommendation for discharge: (in order for the patient to meet his/her long term goals):   {PT discharge recommendation (Optional):72598}    Other factors to consider for discharge: {therapy other factors for discharge:14522}    IF patient discharges home will need the following DME: {DC DME Devices:91509}       SUBJECTIVE:   Patient stated, \"***.\"    OBJECTIVE DATA SUMMARY:   Critical Behavior:  Orientation  Overall Orientation Status: Impaired  Orientation Level: Oriented to person;Oriented to place;Disoriented to situation;Disoriented to time  Cognition  Overall Cognitive Status: Exceptions  Arousal/Alertness: Appears intact  Following Commands: Follows one step commands with increased time;Follows one step commands with repetition  Attention Span: Attends with cues to redirect  Memory: Impaired;Decreased short term memory;Decreased recall of recent events  Safety Judgement: Decreased awareness of need for safety;Decreased awareness of need for assistance  Problem Solving: Impaired;Assistance required to implement solutions;Assistance required to identify errors made;Assistance required to correct errors made  Insights: Decreased awareness of deficits  Initiation:

## 2025-06-30 NOTE — DISCHARGE SUMMARY
Discharge Summary       PATIENT ID: Vinicius Calles  MRN: [id number]   YOB: 1932    DATE OF ADMISSION: 6/28/2025  1:07 PM    DATE OF DISCHARGE: 06/30/2025   PRIMARY CARE PROVIDER: nAaya Glass MD     ATTENDING PHYSICIAN: Dr. Blanche Todd  DISCHARGING PROVIDER: Inga Morocho PA-C    To contact this individual call [phone] and ask the  to page.  If unavailable ask to be transferred the Adult Hospitalist Department.    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  Patient stated he just could not walk, his legs feel \"paraplegic\".  He denied fall or back pain, paresthesia, numbness, tingling sensation.  No bowel or bladder incontinence or retention.  He was moving his legs fine while in bed basically just could not walk.  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 He denied nausea, vomiting, fever, chills, diarrhea or urinary complaints.     On initial eval in the ED,  vital signs were stable  Pertinent lab findings include:  -Creatinine 2.19, BUN 52, proBNP 4911, AST 45, T. bili 1.1  -Hemoglobin 9.5, .3  -UA negative-CXR negative     Patient was recently hospitalized from 2/10 - 2/21/2025 for acute on chronic HFpEF, CKD stage III-IV and hypokalemia.    While inpatient, patient was evaluated for fatigue and inability to walk and evaluation of dehydration.  Patient had CT head which showed no intracranial abnormality and had a CT lumbar spine that showed: Multilevel degenerative change detailed by level above most significant at L4-5 and L5-S1. Vacuum phenomenon with disc height loss and a small bulge at L2-L3, L3-L4, L4-L5, L5-S.

## 2025-06-30 NOTE — DISCHARGE INSTRUCTIONS
Discharge Instructions       PATIENT ID: Vinicius Calles  MRN: [id number]   YOB: 1932    DATE OF ADMISSION: 6/28/2025   DATE OF DISCHARGE: 6/30/2025    PRIMARY CARE PROVIDER: Anaya Glass     ATTENDING PHYSICIAN: Blanche Todd MD   DISCHARGING PROVIDER: Inga Morocho PA-C    To contact this individual call [phone] and ask the  to page.   If unavailable ask to be transferred the Adult Hospitalist Department.    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  Patient stated he just could not walk, his legs feel \"paraplegic\".  He denied fall or back pain, paresthesia, numbness, tingling sensation.  No bowel or bladder incontinence or retention.  He was moving his legs fine while in bed basically just could not walk.  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 He denied nausea, vomiting, fever, chills, diarrhea or urinary complaints.     On initial eval in the ED,  vital signs were stable  Pertinent lab findings include:  -Creatinine 2.19, BUN 52, proBNP 4911, AST 45, T. bili 1.1  -Hemoglobin 9.5, .3  -UA negative-CXR negative     Patient was recently hospitalized from 2/10 - 2/21/2025 for acute on chronic HFpEF, CKD stage III-IV and hypokalemia.     While inpatient, patient was evaluated for fatigue and inability to walk and evaluation of dehydration.  Patient had CT head which showed no intracranial abnormality and had a CT lumbar spine that showed: Multilevel degenerative change detailed by level above most significant at L4-5 and L5-S1. Vacuum phenomenon with disc height loss and a small bulge at L2-L3, L3-L4, L4-L5, L5-S. Osteopenia. No acute fracture. Spine (neurosurgery) consulted, no need for any neurosurgical intervention at this time. If further workup wanted,

## 2025-06-30 NOTE — PLAN OF CARE
Problem: Physical Therapy - Adult  Goal: By Discharge: Performs mobility at highest level of function for planned discharge setting.  See evaluation for individualized goals.  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    HOME SUPPORT PRIOR TO ADMISSION: The patient lived with his wife and reports requiring no assistance.    Physical Therapy Goals  Initiated 6/29/2025  1.  Patient will move from supine to sit and sit to supine , scoot up and down, and roll side to side in bed with independence within 7 day(s).    2.  Patient will transfer from bed to chair and chair to bed with independence using the least restrictive device within 7 day(s).  3.  Patient will perform sit to stand with independence within 7 day(s).  4.  Patient will ambulate with independence for 150 feet with the least restrictive device within 7 day(s).   5.  Patient will ascend/descend 4 stairs with 1 handrail(s) with supervision/set-up within 7 day(s).   Outcome: Progressing  PHYSICAL THERAPY EVALUATION    Patient: Vinicius Calles (89 y.o. male)  Date: 6/29/2025  Primary Diagnosis: Chronic diastolic heart failure (HCC) [I50.32]  Weakness [R53.1]  Unable to walk [R26.2]  Stage 4 chronic kidney disease (HCC) [N18.4]       Precautions:              ASSESSMENT :   DEFICITS/IMPAIRMENTS:   The patient is limited by decreased activity tolerance, endurance, balance and he is slightly unsteady when up using the RW.  He is in the bed on arrival agreeable to get up.  He is pleasant and cooperative and appears to have decreased short term memory.  He is able to come to sitting with CGA and has good seated balance.  After sitting to get a BP he stood with CGA of 2 and used the RW to side step/ambulate 3 feet to the chair.  He was able to stand for a standing BP before sitting down.  He has a kyphotic posture and tends to let his knees flex in standing.  He appears to have arthritic knees.  The 
  Problem: Safety - Adult  Goal: Free from fall injury  6/29/2025 0940 by Angela Tracy RN  Outcome: Progressing  6/29/2025 0014 by Erica Wadsworth RN  Outcome: Progressing     Problem: Chronic Conditions and Co-morbidities  Goal: Patient's chronic conditions and co-morbidity symptoms are monitored and maintained or improved  6/29/2025 0940 by Angela Tracy RN  Outcome: Progressing  6/29/2025 0014 by Erica Wadsworth RN  Outcome: Progressing     Problem: Discharge Planning  Goal: Discharge to home or other facility with appropriate resources  Outcome: Progressing     Problem: ABCDS Injury Assessment  Goal: Absence of physical injury  Outcome: Progressing     
  Problem: Safety - Adult  Goal: Free from fall injury  Outcome: Progressing     Problem: Chronic Conditions and Co-morbidities  Goal: Patient's chronic conditions and co-morbidity symptoms are monitored and maintained or improved  Outcome: Progressing     
  Problem: Safety - Adult  Goal: Free from fall injury  Outcome: Progressing     Problem: Chronic Conditions and Co-morbidities  Goal: Patient's chronic conditions and co-morbidity symptoms are monitored and maintained or improved  Outcome: Progressing     Problem: Discharge Planning  Goal: Discharge to home or other facility with appropriate resources  Outcome: Progressing     Problem: ABCDS Injury Assessment  Goal: Absence of physical injury  Outcome: Progressing    
left in no apparent distress sitting up in chair, Call bell within reach, and Bed/ chair alarm activated    COMMUNICATION/EDUCATION:   The patient's plan of care was discussed with: physical therapist and registered nurse    Patient Education  Education Given To: Patient  Education Provided: Role of Therapy;Energy Conservation;Plan of Care;Transfer Training;Mobility Training;Fall Prevention Strategies  Education Method: Demonstration;Verbal  Barriers to Learning: Cognition  Education Outcome: Continued education needed    Thank you for this referral.  Joanna Mueller OT  Minutes: 32  
perform activity  Bowel Control: No accidents. Able to use enema or suppository if needed  Bladder Control: Occasional accidents or needs help with device  Toilet Transfers: Needs help for balance, handling clothes or toilet paper  Chair/Bed Trannsfers: Minimum assistance or supervision required  Ambulation: Cannot perform activity  Stairs: Cannot perform activity  Total Barthel Index Score: 45       The Barthel ADL Index: Guidelines  1. The index should be used as a record of what a patient does, not as a record of what a patient could do.  2. The main aim is to establish degree of independence from any help, physical or verbal, however minor and for whatever reason.  3. The need for supervision renders the patient not independent.  4. A patient's performance should be established using the best available evidence. Asking the patient, friends/relatives and nurses are the usual sources, but direct observation and common sense are also important. However direct testing is not needed.  5. Usually the patient's performance over the preceding 24-48 hours is important, but occasionally longer periods will be relevant.  6. Middle categories imply that the patient supplies over 50 per cent of the effort.  7. Use of aids to be independent is allowed.    Score Interpretation (from Mike 1997)    Independent   60-79 Minimally independent   40-59 Partially dependent   20-39 Very dependent   <20 Totally dependent     -Abel Akins., Barthel, D.W. (1965). Functional evaluation: the Barthel Index. Md [facility] Med J 142.  -RAJIV Mckeon, DIMPLE Mane (1997). The Barthel activities of daily living index: self-reporting versus actual performance in the old (> or = 75 years). Journal of American Geriatric Society 45(7), 832-836.   -SEFERINO TaverasF, YASEMIN Rashid., Blue, JMariiaA., Gillespie, A.J. (1999). Measuring the change in disability after inpatient rehabilitation; comparison of the responsiveness of the Barthel Index and

## 2025-07-02 ENCOUNTER — OFFICE VISIT (OUTPATIENT)
Age: 89
End: 2025-07-02

## 2025-07-02 VITALS
SYSTOLIC BLOOD PRESSURE: 108 MMHG | OXYGEN SATURATION: 97 % | HEART RATE: 73 BPM | TEMPERATURE: 97.4 F | RESPIRATION RATE: 16 BRPM | DIASTOLIC BLOOD PRESSURE: 70 MMHG

## 2025-07-02 DIAGNOSIS — Z09 HOSPITAL DISCHARGE FOLLOW-UP: Primary | ICD-10-CM

## 2025-07-02 DIAGNOSIS — F03.B18 MODERATE DEMENTIA WITH OTHER BEHAVIORAL DISTURBANCE, UNSPECIFIED DEMENTIA TYPE (HCC): ICD-10-CM

## 2025-07-02 DIAGNOSIS — I50.32 CHRONIC DIASTOLIC CONGESTIVE HEART FAILURE (HCC): ICD-10-CM

## 2025-07-02 DIAGNOSIS — R39.81 FUNCTIONAL URINARY INCONTINENCE: ICD-10-CM

## 2025-07-02 DIAGNOSIS — N18.4 CHRONIC RENAL IMPAIRMENT, STAGE 4 (SEVERE) (HCC): ICD-10-CM

## 2025-07-02 RX ORDER — POTASSIUM CHLORIDE 1.5 G/1.58G
20 POWDER, FOR SOLUTION ORAL 2 TIMES DAILY
COMMUNITY

## 2025-07-02 NOTE — PROGRESS NOTES
No Stress Concern Present (9/6/2023)    [facility] from [facility]    [employer] [place] [employer] - Occupational Stress Questionnaire     Feeling of Stress : Not at all   Social Connections: Moderately Integrated (9/6/2023)    Received from [facility], [facility]    Social Connection and Isolation Panel [NHANES]     Frequency of Communication with Friends and Family: More than three times a week     Frequency of Social Gatherings with Friends and Family: More than three times a week     Attends Buddhism Services: 1 to 4 times per year     Active Member of Clubs or Organizations: No     Attends Club or Organization Meetings: Never     Marital Status:    Intimate Partner Violence: Unknown (8/7/2024)    [facility]    Humiliation, Afraid, Rape, and Kick questionnaire     Fear of Current or Ex-Partner: Not on file     Emotionally Abused: Not on file     Physically Abused: No     Sexually Abused: Not on file   Depression: Not at risk (4/21/2025)    PHQ-2     PHQ-2 Score: 1   Housing Stability: Low Risk  (3/20/2025)    Housing Stability Vital Sign     Unable to Pay for Housing in the Last Year: No     Number of Times Moved in the Last Year: 0     Homeless in the Last Year: No   Interpersonal Safety: Not At Risk (5/13/2025)    Interpersonal Safety Domain Source: IP Abuse Screening     Physical abuse: Denies     Verbal abuse: Denies     Emotional abuse: Denies     Financial abuse: Denies     Sexual abuse: Denies   Utilities: Not At Risk (3/20/2025)    [facility] Utilities     Threatened with loss of utilities: No

## 2025-07-02 NOTE — PROGRESS NOTES
upon discharge from the hospital.   -he has voiced that if his kidney function continues to decline, he does not want dialysis.  Wife in agreement.     4. Functional urinary incontinence  -on chronic diuretic use.  Has dementia that is inhibiting him from identifying urge to use restroom.  He is also disliking use of adult diapers.  Had urinary incontinence in our waiting room today.  Encouraged him to allow assistance and to wear the adult diapers .    5.Chronic diastolic congestive heart failure (HCC)  -back on bumex daily  -has baseline +1 edema around ankles.    6. PAF  -eliquis stopped due to high risk of falls.     Orders Placed This Encounter    HI DISCHARGE MEDS RECONCILED W/ CURRENT OUTPATIENT MED LIST    Bumetanide (BUMEX PO)     Sig: Take by mouth 2 tablets in the morning and 1 tablet in the afternoon if needed for excessive swelling    potassium chloride (KLOR-CON) 20 MEQ packet     Sig: Take 20 mEq by mouth 2 times daily        Medical Decision Making: high complexity             Subjective:   HPI:  Follow up of Hospital problems/diagnosis(es): fatigue, lumbar degenerative disease     Inpatient course: Discharge summary reviewed- see chart.  - imaging including head CT, lumbar spine Ct showing no acute abnormalities.    -CT lumb spine - significant multilevel degenerative changes. Neurosurgery consulted.  No intervention.  PT/OT recommended home health     Interval history/Current status:   -home PT to start today  -continues to have difficulty at home.  Incontinent of urine and stool.  Has home aids, but he is refusing their assistance.  Bedroom is on second floor and having difficulty maneuvering steps.  Has small bedroom on first floor, but not able to monitor him closely from wandering on the first floor.    -gait is becoming more limited.  Uses walker.     Patient Active Problem List   Diagnosis    Diverticulosis of sigmoid colon    Grade IV hemorrhoids    Chronic diastolic congestive heart failure

## 2025-07-14 ENCOUNTER — TELEPHONE (OUTPATIENT)
Age: 89
End: 2025-07-14

## 2025-07-14 NOTE — TELEPHONE ENCOUNTER
Meera from [facility] called to confirm that we received documents for Dr. Glass to sign for the patient - PRN medications.  (She will be re-faxing it today.)    Meera - [phone]

## 2025-07-23 ENCOUNTER — APPOINTMENT (OUTPATIENT)
Facility: HOSPITAL | Age: 89
End: 2025-07-23
Payer: MEDICARE

## 2025-07-23 ENCOUNTER — HOSPITAL ENCOUNTER (EMERGENCY)
Facility: HOSPITAL | Age: 89
Discharge: NURSING, REHAB OR LONG-TERM CARE | End: 2025-07-24
Attending: STUDENT IN AN ORGANIZED HEALTH CARE EDUCATION/TRAINING PROGRAM
Payer: MEDICARE

## 2025-07-23 DIAGNOSIS — M19.90 INFLAMMATORY ARTHRITIS: Primary | ICD-10-CM

## 2025-07-23 DIAGNOSIS — M25.561 ACUTE PAIN OF RIGHT KNEE: ICD-10-CM

## 2025-07-23 PROBLEM — E44.0 MODERATE PROTEIN-CALORIE MALNUTRITION: Status: ACTIVE | Noted: 2025-07-23

## 2025-07-23 PROBLEM — R53.81 PHYSICAL DEBILITY: Status: ACTIVE | Noted: 2025-07-23

## 2025-07-23 LAB
ALBUMIN SERPL-MCNC: 2.9 G/DL (ref 3.5–5)
ALBUMIN/GLOB SERPL: 0.6 (ref 1.1–2.2)
ALP SERPL-CCNC: 186 U/L (ref 45–117)
ALT SERPL-CCNC: 57 U/L (ref 12–78)
ANION GAP SERPL CALC-SCNC: 7 MMOL/L (ref 2–12)
APPEARANCE UR: CLEAR
AST SERPL-CCNC: 91 U/L (ref 15–37)
BACTERIA URNS QL MICRO: NEGATIVE /HPF
BASOPHILS # BLD: 0.06 K/UL (ref 0–0.1)
BASOPHILS NFR BLD: 0.4 % (ref 0–1)
BILIRUB SERPL-MCNC: 1.9 MG/DL (ref 0.2–1)
BILIRUB UR QL: NEGATIVE
BODY FLD TYPE: NORMAL
BUN SERPL-MCNC: 43 MG/DL (ref 6–20)
BUN/CREAT SERPL: 22 (ref 12–20)
CALCIUM SERPL-MCNC: 9.1 MG/DL (ref 8.5–10.1)
CHLORIDE SERPL-SCNC: 99 MMOL/L (ref 97–108)
CO2 SERPL-SCNC: 31 MMOL/L (ref 21–32)
COLOR UR: ABNORMAL
COMMENT:: NORMAL
CREAT SERPL-MCNC: 1.98 MG/DL (ref 0.7–1.3)
CRYSTALS FLD MICRO: NORMAL
DIFFERENTIAL METHOD BLD: ABNORMAL
EOSINOPHIL # BLD: 0.02 K/UL (ref 0–0.4)
EOSINOPHIL NFR BLD: 0.1 % (ref 0–7)
EPITH CASTS URNS QL MICRO: ABNORMAL /LPF
ERYTHROCYTE [DISTWIDTH] IN BLOOD BY AUTOMATED COUNT: 17.1 % (ref 11.5–14.5)
GLOBULIN SER CALC-MCNC: 4.9 G/DL (ref 2–4)
GLUCOSE SERPL-MCNC: 117 MG/DL (ref 65–100)
GLUCOSE UR STRIP.AUTO-MCNC: NEGATIVE MG/DL
HCT VFR BLD AUTO: 33 % (ref 36.6–50.3)
HGB BLD-MCNC: 10.3 G/DL (ref 12.1–17)
HGB UR QL STRIP: ABNORMAL
HYALINE CASTS URNS QL MICRO: ABNORMAL /LPF (ref 0–5)
IMM GRANULOCYTES # BLD AUTO: 0.27 K/UL (ref 0–0.04)
IMM GRANULOCYTES NFR BLD AUTO: 1.7 % (ref 0–0.5)
KETONES UR QL STRIP.AUTO: NEGATIVE MG/DL
LEUKOCYTE ESTERASE UR QL STRIP.AUTO: NEGATIVE
LYMPHOCYTES # BLD: 1.38 K/UL (ref 0.8–3.5)
LYMPHOCYTES NFR BLD: 8.7 % (ref 12–49)
MCH RBC QN AUTO: 34.2 PG (ref 26–34)
MCHC RBC AUTO-ENTMCNC: 31.2 G/DL (ref 30–36.5)
MCV RBC AUTO: 109.6 FL (ref 80–99)
MONOCYTES # BLD: 2.05 K/UL (ref 0–1)
MONOCYTES NFR BLD: 12.9 % (ref 5–13)
NEUTS SEG # BLD: 12.12 K/UL (ref 1.8–8)
NEUTS SEG NFR BLD: 76.2 % (ref 32–75)
NITRITE UR QL STRIP.AUTO: NEGATIVE
NRBC # BLD: 0.04 K/UL (ref 0–0.01)
NRBC BLD-RTO: 0.3 PER 100 WBC
PH UR STRIP: 5.5 (ref 5–8)
PLATELET # BLD AUTO: 173 K/UL (ref 150–400)
POTASSIUM SERPL-SCNC: 4.1 MMOL/L (ref 3.5–5.1)
PROCALCITONIN SERPL-MCNC: 0.87 NG/ML
PROT SERPL-MCNC: 7.8 G/DL (ref 6.4–8.2)
PROT UR STRIP-MCNC: 30 MG/DL
RBC # BLD AUTO: 3.01 M/UL (ref 4.1–5.7)
RBC #/AREA URNS HPF: ABNORMAL /HPF (ref 0–5)
RBC MORPH BLD: ABNORMAL
RBC MORPH BLD: ABNORMAL
SODIUM SERPL-SCNC: 137 MMOL/L (ref 136–145)
SP GR UR REFRACTOMETRY: 1.01 (ref 1–1.03)
SPECIMEN HOLD: NORMAL
SPECIMEN HOLD: NORMAL
UROBILINOGEN UR QL STRIP.AUTO: 2 EU/DL (ref 0.2–1)
WBC # BLD AUTO: 15.9 K/UL (ref 4.1–11.1)
WBC URNS QL MICRO: ABNORMAL /HPF (ref 0–4)

## 2025-07-23 PROCEDURE — 2580000003 HC RX 258: Performed by: STUDENT IN AN ORGANIZED HEALTH CARE EDUCATION/TRAINING PROGRAM

## 2025-07-23 PROCEDURE — 80053 COMPREHEN METABOLIC PANEL: CPT

## 2025-07-23 PROCEDURE — 99284 EMERGENCY DEPT VISIT MOD MDM: CPT

## 2025-07-23 PROCEDURE — 97530 THERAPEUTIC ACTIVITIES: CPT

## 2025-07-23 PROCEDURE — 85025 COMPLETE CBC W/AUTO DIFF WBC: CPT

## 2025-07-23 PROCEDURE — 84145 PROCALCITONIN (PCT): CPT

## 2025-07-23 PROCEDURE — 81001 URINALYSIS AUTO W/SCOPE: CPT

## 2025-07-23 PROCEDURE — 99284 EMERGENCY DEPT VISIT MOD MDM: CPT | Performed by: NURSE PRACTITIONER

## 2025-07-23 PROCEDURE — 73562 X-RAY EXAM OF KNEE 3: CPT

## 2025-07-23 PROCEDURE — 70450 CT HEAD/BRAIN W/O DYE: CPT

## 2025-07-23 PROCEDURE — 89050 BODY FLUID CELL COUNT: CPT

## 2025-07-23 PROCEDURE — 6360000002 HC RX W HCPCS: Performed by: PHYSICIAN ASSISTANT

## 2025-07-23 PROCEDURE — 6370000000 HC RX 637 (ALT 250 FOR IP): Performed by: NURSE PRACTITIONER

## 2025-07-23 PROCEDURE — 87205 SMEAR GRAM STAIN: CPT

## 2025-07-23 PROCEDURE — 20610 DRAIN/INJ JOINT/BURSA W/O US: CPT

## 2025-07-23 PROCEDURE — 74176 CT ABD & PELVIS W/O CONTRAST: CPT

## 2025-07-23 PROCEDURE — 97161 PT EVAL LOW COMPLEX 20 MIN: CPT

## 2025-07-23 PROCEDURE — 6370000000 HC RX 637 (ALT 250 FOR IP): Performed by: EMERGENCY MEDICINE

## 2025-07-23 PROCEDURE — 71045 X-RAY EXAM CHEST 1 VIEW: CPT

## 2025-07-23 PROCEDURE — 89060 EXAM SYNOVIAL FLUID CRYSTALS: CPT

## 2025-07-23 PROCEDURE — 87070 CULTURE OTHR SPECIMN AEROBIC: CPT

## 2025-07-23 RX ORDER — LIDOCAINE HYDROCHLORIDE AND EPINEPHRINE 10; 10 MG/ML; UG/ML
10 INJECTION, SOLUTION INFILTRATION; PERINEURAL ONCE
Status: COMPLETED | OUTPATIENT
Start: 2025-07-23 | End: 2025-07-23

## 2025-07-23 RX ORDER — PREDNISONE 20 MG/1
40 TABLET ORAL DAILY
Status: DISCONTINUED | OUTPATIENT
Start: 2025-07-23 | End: 2025-07-24 | Stop reason: HOSPADM

## 2025-07-23 RX ORDER — ACETAMINOPHEN 500 MG
1000 TABLET ORAL EVERY 4 HOURS PRN
Status: DISCONTINUED | OUTPATIENT
Start: 2025-07-23 | End: 2025-07-24 | Stop reason: HOSPADM

## 2025-07-23 RX ORDER — ACETAMINOPHEN 500 MG
1000 TABLET ORAL EVERY 6 HOURS PRN
COMMUNITY

## 2025-07-23 RX ORDER — METHYLPREDNISOLONE ACETATE 40 MG/ML
40 INJECTION, SUSPENSION INTRA-ARTICULAR; INTRALESIONAL; INTRAMUSCULAR; SOFT TISSUE ONCE
Status: COMPLETED | OUTPATIENT
Start: 2025-07-23 | End: 2025-07-23

## 2025-07-23 RX ORDER — 0.9 % SODIUM CHLORIDE 0.9 %
500 INTRAVENOUS SOLUTION INTRAVENOUS ONCE
Status: COMPLETED | OUTPATIENT
Start: 2025-07-23 | End: 2025-07-23

## 2025-07-23 RX ADMIN — LIDOCAINE HYDROCHLORIDE AND EPINEPHRINE 10 ML: 10; 10 INJECTION, SOLUTION INFILTRATION; PERINEURAL at 17:46

## 2025-07-23 RX ADMIN — SODIUM CHLORIDE 500 ML: 0.9 INJECTION, SOLUTION INTRAVENOUS at 17:50

## 2025-07-23 RX ADMIN — ACETAMINOPHEN 1000 MG: 500 TABLET ORAL at 17:52

## 2025-07-23 RX ADMIN — PREDNISONE 40 MG: 20 TABLET ORAL at 20:57

## 2025-07-23 RX ADMIN — ACETAMINOPHEN 1000 MG: 500 TABLET ORAL at 20:59

## 2025-07-23 RX ADMIN — METHYLPREDNISOLONE ACETATE 40 MG: 40 INJECTION, SUSPENSION INTRA-ARTICULAR; INTRALESIONAL; INTRAMUSCULAR; INTRASYNOVIAL; SOFT TISSUE at 17:59

## 2025-07-23 ASSESSMENT — PAIN DESCRIPTION - DESCRIPTORS: DESCRIPTORS: SORE

## 2025-07-23 ASSESSMENT — PAIN DESCRIPTION - ORIENTATION
ORIENTATION: RIGHT
ORIENTATION: RIGHT

## 2025-07-23 ASSESSMENT — PAIN SCALES - GENERAL
PAINLEVEL_OUTOF10: 3
PAINLEVEL_OUTOF10: 6

## 2025-07-23 ASSESSMENT — ENCOUNTER SYMPTOMS: SHORTNESS OF BREATH: 0

## 2025-07-23 ASSESSMENT — PAIN - FUNCTIONAL ASSESSMENT
PAIN_FUNCTIONAL_ASSESSMENT: ACTIVITIES ARE NOT PREVENTED
PAIN_FUNCTIONAL_ASSESSMENT: PREVENTS OR INTERFERES SOME ACTIVE ACTIVITIES AND ADLS
PAIN_FUNCTIONAL_ASSESSMENT: NONE - DENIES PAIN

## 2025-07-23 ASSESSMENT — PAIN DESCRIPTION - LOCATION: LOCATION: KNEE

## 2025-07-23 NOTE — ED NOTES
[facility]  O- [phone]    [facility] ED Geriatric Consult Team  Rosalinda Arevalo APRN-NP   Radha Wakefield CM    Patient Name: Vinicius Calles  YOB: 1932    Date of Initial Consult: 7/23/2025  Reason for Consult: Assess ability to ambulate, geriatric medication assessment, ACP  Requesting Provider: Dr Lloyd Kemp/ Dr Tonia Aguayo      SUMMARY:   Vinicius Calles is a 89 y.o. with a past history of CAD, BPH, CHF, CKD stage 4, Gouty arthritis and memory impairment with agitation, who was arrived in the [facility] ED via EMS on 7/23/2025 from [facility] with a diagnosis of debility, physical, knee pain, right.     Current medical issues leading to Geriatric Consult  involvement include:   [] SNF Transfer  [x] Prior admission within 30 days  -06/28/25-06/30/25 [facility] Weakness, unable to walk  -05/13/25  ED Chronic CHF  -03/29/25  ED SPT acute bacterial conjunctivitis of right eye  -02/20/25-02/21/25  IP [facility] heart failure  [x] Geriatric Medication Assessment  -Performed utilizing documents from [facility] who manages all medication administration.  Patient last received medication on 7/22/2025.  - Tolerating a regular diet.  - Allergies reviewed.  [] Complex Medical Conditions  [] Complex Case Management and/or SDOH  [x] Goals of Care    GERIATRIC DIAGNOSES:   Knee pain, right  Debility, physical  Malnutrition, Moderate protein     PLAN:   Patient pending imaging results and evaluation by ED provider prior to final disposition.  Geriatric medication assessment performed and updated utilizing documentation from [facility].  No medications received today last taken on 7/22/25.  Per staff at facility patient has been taking oral medication without difficulty.  Hospice consult placed after telephone conversation with Alysa Stevan, spouse.  Mrs. Rosas requested hospice informational meeting to be set up

## 2025-07-23 NOTE — CONSULTS
ORTHOPEDIC SURGERY CONSULT    Subjective:     Date of Consultation:  July 23, 2025    Vinicius Calles is a 89 y.o. male who is being seen for right knee pain. Reports chronic knee pain from arthritis and intermittent episodes of gout. Reports taking allopurinol in the past for gout. He has been symptomatic for several days. He is now having pain with walking and limited ROM. Denies recent injury. Denies fever/chills. No other complaints. Orthopedic surgery has been consulted by Dr. Aguayo for evaluation of right knee pain and swelling.    Patient Active Problem List    Diagnosis Date Noted    Physical debility 07/23/2025    Moderate protein-calorie malnutrition 07/23/2025    Right knee pain 07/23/2025    AV block 01/15/2025    Nonrheumatic tricuspid valve regurgitation 01/15/2025    History of pulmonary embolus (PE) 01/15/2025    Longstanding persistent atrial fibrillation (HCC) 01/15/2025    Coronary artery disease involving native coronary artery of native heart without angina pectoris 01/15/2025    PAD (peripheral artery disease) 01/15/2025    S/P colonoscopic polypectomy 01/15/2025    Chronic diastolic congestive heart failure (HCC) 09/06/2021    Diverticulosis of sigmoid colon 01/09/2017    Grade IV hemorrhoids 01/09/2017     Family History   Problem Relation Age of Onset    Heart Disease Father       Social History     Tobacco Use    Smoking status: Never     Passive exposure: Never    Smokeless tobacco: Never   Substance Use Topics    Alcohol use: Yes     Alcohol/week: 1.0 standard drink of alcohol     Types: 1 Glasses of wine per week     Past Medical History:   Diagnosis Date    Arthritis     knee, ankle    CAD (coronary artery disease)     CHF (congestive heart failure) (HCC)     Chronic kidney disease     Chronic obstructive pulmonary disease (HCC)     denies    GERD (gastroesophageal reflux disease)     Heart failure ([facility])     History of pelvic fracture 2024    Hypertension     Ill-defined condition

## 2025-07-23 NOTE — PROGRESS NOTES
PHYSICAL THERAPY EVALUATION    Patient: Vinicius Calles (89 y.o. male)  Date: 7/23/2025  Primary Diagnosis: No admission diagnoses are documented for this encounter.       Precautions:              ASSESSMENT :   Patient presented to the ED w/ generalized weakness, fatigue and pain. Therapy was asked to see pt to assess for discharge, seen w/ Ele NP.  Pt was received on the stretcher A&O x2-3, endorses \"discomfort\" when asked about pain.  Pt completed bed mobility and sit<->stand w/ max A x2,  demos poor tolerance to movement and standing, only able to tolerate brief stand w/ the RW d/t his pain. Pt w/ decreased ROM and increased extremity stiffness and pain w/ all movement  w/ most pain in the R knee.  R knee warm, swollen and tender to touch.  Additional imaging ordered.     At baseline pt ambulates w/ a walker or cane, has been requiring assist of staff and a WC to ambulate this past week.  Based on the impairments listed above the patient is below his functional baseline and at risk for falls.  Noted the plan for discharge.  Recommend SNF vs return to facility w/ increased staff support and physical therapy. Acute therapy will sign off.  If the plan changes and pt is admitted, please re-consult acute PT.     Functional Outcome Measure:  The patient scored 8/24 on the AM PAC outcome measure which.  Cutoff score <=171,2,3 had higher odds of discharging home with home health or need of SNF/IPR.         PLAN :  Pt seen in the ED.  No plan for admission.  Order completed.  Please re-consult IP PT if the plan changes and pt is admitted.     Recommendation for discharge: (in order for the patient to meet his/her long term goals):   Moderate intensity short-term skilled physical therapy up to 5x/week,     Other factors to consider for discharge: impaired cognition, high risk for falls, and concern for safely navigating or managing the home environment    IF patient discharges home will need the following DME: Defer to

## 2025-07-23 NOTE — ED NOTES
3:39 PM  Change of shift. Care of patient taken over from Dr Kemp; H&P reviewed, bedside handoff complete. Rapid decline, possible dehydration, received 500ml NS bolus.  Awaiting CXR, Geriatrics evaluation.     4:10 PM  PT noted right knee pain, mild erythema.  XR knee ordered.      5:40 PM  Patient with severe arthritis, some erythema and effusion,  suspect gout >>septic joint but with age and CKD would treat with steroids.  Ortho consulted for arthrocentesis.      5:58 PM  Discussed with Case management.  Patient son is requesting hospice enrollment which they are arranging but does not want patient returning to Assisted living.  Would want patient to return home.  Plan for ED board overnight and return home with hospice tomorrow.      6:58 PM  Aspiration or right knee done but orthopedics, sent for culture.  They suspect gout recommend starting treatment for gout and outpatient follow-up.   Patient with CKD and advanced age.  Will treat with prednisone.     7:21 PM  Discussed with Son.  He notes patient has had rapid decline over past week. Has been having trouble feeding self and no longer walking which is new.  While the walking would be explained by his gout flare, not feeding would not.  CT abd/pelvis with diverticulosis and small right pleural effusion not seen and CXR.  Will obtain CT head to r/o ICH or mass.   Son still in agreement with enrollment in hospice if no reversible cause of decline noted.  UA not c/w UTI and CXR clear.  Prednisone ordered for Gout flare.     12:59 AM  Change of shift.  Care of patient signed over to Dr Rodriguez.  Bedside handoff complete. Awaiting Care management in the morning to completed Hospice admission,  plan for patient to be discharge home rather than his assisted living with hospice     Tonia Aguayo MD  07/24/25 0104

## 2025-07-23 NOTE — PROCEDURES
Arthrocentesis without Injection Procedural Report    Indications: Symptomatic relief of large effusion    Preprocedural Diagnosis: Right knee arthritis, gouty arthropathy    Postprocedural Diagnosis:  Right knee arthritis, gouty arthropat    Provider: DARÍO Crandall     Anesthesia: Local    Allergy:   Allergies   Allergen Reactions    Sulfa Antibiotics Rash       Procedure Details:  The risks,benefits and alternatives of a joint aspiration were explained and consent was obtained for the procedure. The aspiration is being done for diagnostic purposes.  The area was cleansed using Betadine.  Anesthetic using 1% lidocaine with epinephrine was infiltrated locally.  Using a 18 gauge needle 50 mLs of cloudy synovial fluid was obtained.  A ACE wrap was applied.  The patient tolerated the procedure well.    The fluid was sent to the lab.    Orders on Fluid: synovial fluid for cell count; C&S and gram stain; crystal analysis    Estimated Blood Loss:  none    Specimens: Right knee synovial fluid         Complications:  None; patient tolerated the procedure well.             Signed By: DARÍO Crandall

## 2025-07-23 NOTE — CARE COORDINATION
ED Case Management:    Pt transitioned from living at home with wife to [facility] End 07/11. ASTON spoke with Willa at [facility]. Willa familiar with pt since 07/11 admission. Pt has used rw to ambulate in his own room some, but reports pt is taken by wc to dining guy and any shared spaces. Pt has not been seen using rw since weekend. Willa reported pt has cried out in pain when they try to move him in any way this week, since Monday. Cries out in pain with movement of shoulder, arms, legs... Pt has had no falls known to Willa. Pt is incontinent overnight but can tell staff when he needs to use toilet during the day. Requires assistance with all ADLs except feeding.   [facility] [phone]    17:00 Hospice consult placed after telephone conversation with Alysa Calles, spouse.  Mrs. Rosas requested hospice informational meeting to be set up for tomorrow 7/24, in order for her 2 sons and additional supporting family to be present for conversation.  Spouse stated that she did not want the patient to come to the ED today, however facility insisted to rule out any acute causes for decline.    Referral and call placed to [facility]. Kaylee [facility] liaison will call wife to schedule information meeting. Hospice order attached to referral. [facility] [phone].    Per phone conversation with wife, son Nick Calles will transport pt home when/if he is dc'd from ED tonight.   [facility] [phone]    ASTON met with pt to discuss transport back to [facility]. Pt prefers stretcher transport due to pain and current inability to stand and assist with transfer. BLS more appropriate level of transport.     ASTON met with meliza Felix. Isidro has concerns about pt returning to [facility] for hospice care. Son stated pt will dc home with hospice. ASTON spoke with attending physician, pt will hold in ED for dc to home with hospice tomorrow. Hospice admission and DME will need coordinated. [facility] [phone].    Handoff

## 2025-07-24 VITALS
SYSTOLIC BLOOD PRESSURE: 131 MMHG | HEART RATE: 80 BPM | WEIGHT: 162.48 LBS | DIASTOLIC BLOOD PRESSURE: 74 MMHG | RESPIRATION RATE: 16 BRPM | HEIGHT: 71 IN | BODY MASS INDEX: 22.75 KG/M2 | OXYGEN SATURATION: 98 % | TEMPERATURE: 98 F

## 2025-07-24 PROBLEM — M19.90 INFLAMMATORY ARTHRITIS: Status: ACTIVE | Noted: 2025-07-24

## 2025-07-24 LAB
APPEARANCE FLD: ABNORMAL
COLOR FLD: YELLOW
LYMPHOCYTES NFR FLD: 16 %
NEUTROPHILS NFR FLD: 82 %
NUC CELL # FLD: ABNORMAL /CU MM
OTHER CELLS NFR FLD MANUAL: 2 %
RBC # FLD: >100 /CU MM
SPECIMEN SOURCE FLD: ABNORMAL

## 2025-07-24 PROCEDURE — 99284 EMERGENCY DEPT VISIT MOD MDM: CPT | Performed by: NURSE PRACTITIONER

## 2025-07-24 PROCEDURE — 6370000000 HC RX 637 (ALT 250 FOR IP): Performed by: EMERGENCY MEDICINE

## 2025-07-24 PROCEDURE — 6370000000 HC RX 637 (ALT 250 FOR IP): Performed by: NURSE PRACTITIONER

## 2025-07-24 RX ORDER — PREDNISONE 20 MG/1
TABLET ORAL
Qty: 18 TABLET | Refills: 0 | Status: SHIPPED | OUTPATIENT
Start: 2025-07-24 | End: 2025-08-05

## 2025-07-24 RX ADMIN — PREDNISONE 40 MG: 20 TABLET ORAL at 09:12

## 2025-07-24 RX ADMIN — ACETAMINOPHEN 1000 MG: 500 TABLET ORAL at 09:12

## 2025-07-24 NOTE — ED PROVIDER NOTES
12:47 AM  Change of shift. Care of patient taken over from Dr. Aguayo; H&P reviewed, bedside handoff complete.  Awaiting case management completion of hospice placement with plans to discharge home with home hospice.       0700  Change of shift. Care of patient turned over to Dr. Zepeda; H&P reviewed, handoff complete.        Lino Rodriguez S, DO  07/24/25 0340       Lino Rodriguez, DO  07/24/25 0700    
by Vinicius Arita MD at [facility] MAIN OR    GI      HEENT Bilateral     cataracts    HEENT Right     iridectomy    PACEMAKER INSERTION      August 2023    PROSTATECTOMY      pt denies    VASCULAR SURGERY Bilateral 2012    fem-pop         CURRENT MEDICATIONS       Previous Medications    ALBUTEROL SULFATE HFA (PROVENTIL;VENTOLIN;PROAIR) 108 (90 BASE) MCG/ACT INHALER    Inhale 2 puffs into the lungs every 6 hours as needed for Wheezing    ALLOPURINOL PO    Take by mouth    BUMETANIDE (BUMEX PO)    Take by mouth 2 tablets in the morning and 1 tablet in the afternoon if needed for excessive swelling    DONEPEZIL (ARICEPT) 10 MG TABLET    Take 1 tablet by mouth nightly    FINASTERIDE (PROSCAR) 5 MG TABLET    Take 1 tablet by mouth daily    FLUTICASONE FUROATE-VILANTEROL (BREO ELLIPTA) 100-25 MCG/ACT INHALER    Inhale 1 puff into the lungs daily    POTASSIUM CHLORIDE (KLOR-CON) 20 MEQ PACKET    Take 20 mEq by mouth 2 times daily    RISPERIDONE (RISPERDAL) 1 MG TABLET    1 tablet at 2pm       ALLERGIES     Sulfa antibiotics    FAMILY HISTORY       Family History   Problem Relation Age of Onset    Heart Disease Father           SOCIAL HISTORY       Social History     Socioeconomic History    Marital status:    Tobacco Use    Smoking status: Never     Passive exposure: Never    Smokeless tobacco: Never   Vaping Use    Vaping status: Never Used   Substance and Sexual Activity    Alcohol use: Yes     Alcohol/week: 1.0 standard drink of alcohol     Types: 1 Glasses of wine per week    Drug use: No    Sexual activity: Defer     Social Drivers of Health     Financial Resource Strain: Low Risk  (9/6/2023)    [facility]    Overall Financial Resource Strain (CARDIA)     Difficulty of Paying Living Expenses: Not hard at all   Food Insecurity: No Food Insecurity (3/20/2025)    Hunger Vital Sign     Worried About Running Out of Food in the Last

## 2025-07-24 NOTE — ED NOTES
detention Services  O- [phone]    [facility] ED Geriatric Consult Team  Rosalinda Shah Mickey APRN-NP   Milagros Medina CM    Patient Name: Vinicius Calles  YOB: 1932    Date of Initial Consult: 7/24/2025  Reason for Consult: Assess for needs, family has questions regarding Hospice transition  Requesting Provider: Dr. Samir Zepeda      SUMMARY:   Vinicius Calles is a 89 y.o. with a past history of CAD, BPH, CHF, CKD stage 4, gouty arthritis and memory impairment with agitation, who was arrived in the [facility] ED on 7/23/25 (follow up evaluation on 7/24/2025) from [facility] with a diagnosis of Inflammatory Arthritis and acute pain of right knee.     Current medical issues leading to Geriatric Consult  involvement include:   [] SNF Transfer  [x] Prior admission within 30 days  -06/28/25-06/30/25 [facility] Weakness, unable to walk  -05/13/25  ED Chronic CHF  -03/29/25  ED SPT acute bacterial conjunctivitis of right eye  -02/20/25-02/21/25  IP [facility] heart failure  [x] Geriatric Medication Assessment  -Performed 7/23/25 utilizing documents from [facility].   [] Complex Medical Conditions  [] Complex Case Management and/or SDOH  [x] Goals of Care    GERIATRIC DIAGNOSES:   Inflammatory arthritis  Acute pain of right knee  Debility, physical  Malnutrition, moderate protein     PLAN:   Discussion took place with Dr Samir Zepeda, ED physician.  Patients family has decided to have the patient return back to [facility] and they are going to transition to Hospice/ comfort care at this time.   I have collaborated with Ida Kelley, liaison for [facility], [phone].  Ida has already been in contact with patients son, Nick Calles [phone].   I have collaborated with Milagros Medina CM to assist in stretcher transport back to the [facility].   [] Initial consult note routed to primary continuity provider and/or primary health care

## 2025-07-24 NOTE — CARE COORDINATION
9:03 AM  CM received a call from Kaylee jones with [facility] [phone].  Informed that Hospice is to meet with patient's family to develop a plan this morning at 9:30 am.  Family desires to take patient home with Hospice services in place.  CM expected to receive follow-up after morning meeting.  CM will continue to follow and assist with JOSIAH needs as they arise.    Updates provided to Attending and RN.    9:39 AM  CM received updates from [facility] NP that plan is for patient to discharge back to [facility] under Hospice Services.  [facility] liaison to follow-up with patient's family.  RN to call report to [facility] [phone] .    Referral placed to [facility] via [facility] transport.  They are able to accept and accommodate patient for transport at 11:00 am.  RN and Family notified of updates.    No other CM needs at this time.    FRANK Nuñez/ASTON

## 2025-07-24 NOTE — ED NOTES
ED SIGN OUT NOTE  Care assumed at [facility] 9:22 AM EDT    Patient was signed out to me by Dr. Rodriguez.     Patient is awaiting hospice placement.    /74   Pulse 80   Temp 98 °F (36.7 °C)   Resp 16   Ht 1.803 m (5' 11\")   Wt 73.7 kg (162 lb 7.7 oz)   SpO2 98%   BMI 22.66 kg/m²     Labs Reviewed   URINALYSIS WITH MICROSCOPIC - Abnormal; Notable for the following components:       Result Value    Protein, UA 30 (*)     Blood, Urine SMALL (*)     Urobilinogen, Urine 2.0 (*)     All other components within normal limits   CBC WITH AUTO DIFFERENTIAL - Abnormal; Notable for the following components:    WBC 15.9 (*)     RBC 3.01 (*)     Hemoglobin 10.3 (*)     Hematocrit 33.0 (*)     .6 (*)     MCH 34.2 (*)     RDW 17.1 (*)     Nucleated RBCs 0.3 (*)     nRBC 0.04 (*)     Neutrophils % 76.2 (*)     Lymphocytes % 8.7 (*)     Immature Granulocytes % 1.7 (*)     Neutrophils Absolute 12.12 (*)     Monocytes Absolute 2.05 (*)     Immature Granulocytes Absolute 0.27 (*)     All other components within normal limits   COMPREHENSIVE METABOLIC PANEL - Abnormal; Notable for the following components:    Glucose 117 (*)     BUN 43 (*)     Creatinine 1.98 (*)     BUN/Creatinine Ratio 22 (*)     Est, Glom Filt Rate 31 (*)     Total Bilirubin 1.9 (*)     AST 91 (*)     Alk Phosphatase 186 (*)     Albumin 2.9 (*)     Globulin 4.9 (*)     Albumin/Globulin Ratio 0.6 (*)     All other components within normal limits   BODY FLUID CELL COUNT - Abnormal; Notable for the following components:    RBC, Fluid >100 (*)     Polys, Fluid 82 (*)     Lymphocytes, Body Fluid 16 (*)     Other Cells, Fluid 2 (*)     All other components within normal limits   URINE CULTURE HOLD SAMPLE   CULTURE, BODY FLUID (WITH GRAM STAIN)   EXTRA TUBES HOLD   PROCALCITONIN   CRYSTALS, BODY FLUID     CT HEAD WO CONTRAST   Final Result   No acute process or change compared the prior exam            Electronically signed by LEXI GUILLEN

## 2025-07-27 LAB
BACTERIA SPEC CULT: NORMAL
BACTERIA SPEC CULT: NORMAL
GRAM STN SPEC: NORMAL
SERVICE CMNT-IMP: NORMAL

## (undated) DEVICE — Device

## (undated) DEVICE — SPECIAL PROCEDURE DRAPE 32" X 34": Brand: SPECIAL PROCEDURE DRAPE

## (undated) DEVICE — ANGIOGRAPHY KIT

## (undated) DEVICE — ANGIO-SEAL VIP VASCULAR CLOSURE DEVICE: Brand: ANGIO-SEAL

## (undated) DEVICE — CATHETER ANGIO JR4 AD 5 FRX100 CM 25 CM PERFORMA

## (undated) DEVICE — KIT AT-X65 ANGIOTOUCH HAND CONTROLLER

## (undated) DEVICE — KIT MFLD ISOLATN NACL CNTRST PRT TBNG SPIK W/ PRSS TRNSDUC

## (undated) DEVICE — KIT MED IMAG CNTRST AGNT W/ IOPAMIDOL REUSE

## (undated) DEVICE — SPLINT WR VELC FOAM NEUT POS DISP FOR RAD ART ACC SFT STRP

## (undated) DEVICE — WASTE KIT - ST MARY: Brand: MEDLINE INDUSTRIES, INC.

## (undated) DEVICE — PADPRO DEFIBRILLATION/PACING/CARDIOVERSION/MONITORING ELECTRODES, ADULT/CHILD GREATER THAN 10 KG RADIOTRANSPARENT ELECTRODE, PHYSIO-CONTROL QUIK-COMBO (M) 60" (152 CM): Brand: PADPRO

## (undated) DEVICE — CATHETER DIAG 5FR L100CM LUMN ID0.047IN JL4 CRV 0 SIDE H

## (undated) DEVICE — PINNACLE INTRODUCER SHEATH: Brand: PINNACLE

## (undated) DEVICE — CATHETER DIAG L100CM OD5FR ID35MM VASC JUDKINS L PERIPH W O

## (undated) DEVICE — TR BAND RADIAL ARTERY COMPRESSION DEVICE: Brand: TR BAND

## (undated) DEVICE — PACK PROCEDURE SURG HRT CATH

## (undated) DEVICE — GLIDESHEATH SLENDER STAINLESS STEEL KIT: Brand: GLIDESHEATH SLENDER